# Patient Record
Sex: FEMALE | Race: BLACK OR AFRICAN AMERICAN | Employment: UNEMPLOYED | RURAL
[De-identification: names, ages, dates, MRNs, and addresses within clinical notes are randomized per-mention and may not be internally consistent; named-entity substitution may affect disease eponyms.]

---

## 2017-01-04 DIAGNOSIS — G89.29 CHRONIC MIDLINE LOW BACK PAIN WITHOUT SCIATICA: ICD-10-CM

## 2017-01-04 DIAGNOSIS — M54.50 CHRONIC MIDLINE LOW BACK PAIN WITHOUT SCIATICA: ICD-10-CM

## 2017-01-04 DIAGNOSIS — M05.79 RHEUMATOID ARTHRITIS INVOLVING MULTIPLE SITES WITH POSITIVE RHEUMATOID FACTOR (HCC): ICD-10-CM

## 2017-01-04 RX ORDER — TRAZODONE HYDROCHLORIDE 50 MG/1
50 TABLET ORAL
Qty: 30 TAB | Refills: 2 | Status: SHIPPED | OUTPATIENT
Start: 2017-01-04 | End: 2017-05-30 | Stop reason: ALTCHOICE

## 2017-01-04 RX ORDER — TRAZODONE HYDROCHLORIDE 50 MG/1
50 TABLET ORAL
Qty: 30 TAB | Refills: 2 | Status: CANCELLED | OUTPATIENT
Start: 2017-01-04

## 2017-01-04 RX ORDER — HYDROCODONE BITARTRATE AND ACETAMINOPHEN 7.5; 325 MG/1; MG/1
1 TABLET ORAL
Qty: 90 TAB | Refills: 0 | Status: SHIPPED | OUTPATIENT
Start: 2017-01-04 | End: 2017-02-03 | Stop reason: SDUPTHER

## 2017-01-04 RX ORDER — HYDROCODONE BITARTRATE AND ACETAMINOPHEN 7.5; 325 MG/1; MG/1
1 TABLET ORAL
Qty: 90 TAB | Refills: 0 | Status: CANCELLED | OUTPATIENT
Start: 2017-01-04

## 2017-01-04 NOTE — TELEPHONE ENCOUNTER
Patient is asking for scripts for Hydrocodone and Trazodone. We need to call her when they're ready for .

## 2017-01-06 DIAGNOSIS — M47.817 SPONDYLOSIS OF LUMBOSACRAL REGION, UNSPECIFIED SPINAL OSTEOARTHRITIS COMPLICATION STATUS: ICD-10-CM

## 2017-01-06 DIAGNOSIS — M05.79 RHEUMATOID ARTHRITIS INVOLVING MULTIPLE SITES WITH POSITIVE RHEUMATOID FACTOR (HCC): ICD-10-CM

## 2017-01-06 RX ORDER — TRAMADOL HYDROCHLORIDE 50 MG/1
50 TABLET ORAL
Qty: 90 TAB | Refills: 0 | Status: SHIPPED | OUTPATIENT
Start: 2017-01-06 | End: 2017-02-03 | Stop reason: SDUPTHER

## 2017-01-20 RX ORDER — MOMETASONE FUROATE 1 MG/G
CREAM TOPICAL
Qty: 15 G | Refills: 0 | Status: SHIPPED | OUTPATIENT
Start: 2017-01-20 | End: 2017-04-04 | Stop reason: SDUPTHER

## 2017-01-27 DIAGNOSIS — M79.7 FIBROMYALGIA: ICD-10-CM

## 2017-01-27 RX ORDER — ALPRAZOLAM 0.25 MG/1
0.25 TABLET ORAL
Qty: 45 TAB | Refills: 1 | Status: SHIPPED | OUTPATIENT
Start: 2017-01-27 | End: 2017-03-10 | Stop reason: SDUPTHER

## 2017-02-03 DIAGNOSIS — M54.50 CHRONIC MIDLINE LOW BACK PAIN WITHOUT SCIATICA: ICD-10-CM

## 2017-02-03 DIAGNOSIS — M47.817 SPONDYLOSIS OF LUMBOSACRAL REGION, UNSPECIFIED SPINAL OSTEOARTHRITIS COMPLICATION STATUS: ICD-10-CM

## 2017-02-03 DIAGNOSIS — M05.79 RHEUMATOID ARTHRITIS INVOLVING MULTIPLE SITES WITH POSITIVE RHEUMATOID FACTOR (HCC): ICD-10-CM

## 2017-02-03 DIAGNOSIS — G89.29 CHRONIC MIDLINE LOW BACK PAIN WITHOUT SCIATICA: ICD-10-CM

## 2017-02-03 RX ORDER — HYDROCODONE BITARTRATE AND ACETAMINOPHEN 7.5; 325 MG/1; MG/1
1 TABLET ORAL
Qty: 90 TAB | Refills: 0 | Status: SHIPPED | OUTPATIENT
Start: 2017-02-03 | End: 2017-03-01 | Stop reason: SDUPTHER

## 2017-02-03 RX ORDER — TRAMADOL HYDROCHLORIDE 50 MG/1
50 TABLET ORAL
Qty: 90 TAB | Refills: 0 | Status: SHIPPED | OUTPATIENT
Start: 2017-02-03 | End: 2017-03-31 | Stop reason: SDUPTHER

## 2017-02-10 ENCOUNTER — OFFICE VISIT (OUTPATIENT)
Dept: FAMILY MEDICINE CLINIC | Age: 77
End: 2017-02-10

## 2017-02-10 VITALS
RESPIRATION RATE: 16 BRPM | DIASTOLIC BLOOD PRESSURE: 73 MMHG | BODY MASS INDEX: 31.83 KG/M2 | HEIGHT: 62 IN | OXYGEN SATURATION: 98 % | SYSTOLIC BLOOD PRESSURE: 109 MMHG | WEIGHT: 173 LBS | HEART RATE: 77 BPM | TEMPERATURE: 97.7 F

## 2017-02-10 DIAGNOSIS — F32.A DEPRESSION, UNSPECIFIED DEPRESSION TYPE: Primary | ICD-10-CM

## 2017-02-10 DIAGNOSIS — I48.0 PAROXYSMAL ATRIAL FIBRILLATION (HCC): ICD-10-CM

## 2017-02-10 DIAGNOSIS — Z79.01 ENCOUNTER FOR CURRENT LONG-TERM USE OF ANTICOAGULANTS: ICD-10-CM

## 2017-02-10 DIAGNOSIS — E11.65 TYPE 2 DIABETES MELLITUS WITH HYPERGLYCEMIA, UNSPECIFIED LONG TERM INSULIN USE STATUS: ICD-10-CM

## 2017-02-10 LAB
INR BLD: 5.9
PT POC: 70.9 SEC
VALID INTERNAL CONTROL?: YES

## 2017-02-10 RX ORDER — WARFARIN SODIUM 5 MG/1
TABLET ORAL
Qty: 30 TAB | Refills: 3
Start: 2017-02-10 | End: 2017-05-30 | Stop reason: SDUPTHER

## 2017-02-10 RX ORDER — DULOXETIN HYDROCHLORIDE 20 MG/1
20 CAPSULE, DELAYED RELEASE ORAL DAILY
Qty: 30 CAP | Refills: 3 | Status: SHIPPED | OUTPATIENT
Start: 2017-02-10 | End: 2017-03-10 | Stop reason: SDUPTHER

## 2017-02-10 NOTE — MR AVS SNAPSHOT
Visit Information Date & Time Provider Department Dept. Phone Encounter #  
 2/10/2017  3:30 PM Chely CarrilloDevin 72 [phone] [id number] Follow-up Instructions Return in about 3 months (around 5/10/2017), or if symptoms worsen or fail to improve. Upcoming Health Maintenance Date Due MICROALBUMIN Q1 1/25/1950 DTaP/Tdap/Td series (1 - Tdap) 1/25/1959 ZOSTER VACCINE AGE 60> 1/25/1998 OSTEOPOROSIS SCREENING (DEXA) 1/25/2003 Pneumococcal 65+ High/Highest Risk (1 of 2 - PCV13) 1/25/2003 MEDICARE YEARLY EXAM 1/25/2003 INFLUENZA AGE 9 TO ADULT 8/1/2016 FOOT EXAM Q1 10/19/2016 LIPID PANEL Q1 4/7/2017 EYE EXAM RETINAL OR DILATED Q1 5/19/2017 HEMOGLOBIN A1C Q6M 6/2/2017 GLAUCOMA SCREENING Q2Y 5/19/2018 Allergies as of 2/10/2017  Review Complete On: 2/10/2017 By: NICOLA Oconnell Severity Noted Reaction Type Reactions Statins-hmg-coa Reductase Inhibitors  10/19/2015    Myalgia Current Immunizations  Never Reviewed No immunizations on file. Not reviewed this visit You Were Diagnosed With   
  
 Codes Comments Depression, unspecified depression type    -  Primary ICD-10-CM: F32.9 ICD-9-CM: 434 Type 2 diabetes mellitus with hyperglycemia, unspecified long term insulin use status (HCC)     ICD-10-CM: E11.65 ICD-9-CM: 250.00 Encounter for current long-term use of anticoagulants     ICD-10-CM: Z79.01 
ICD-9-CM: V58.61 Vitals BP Pulse Temp Resp Height(growth percentile) Weight(growth percentile) 109/73 (BP 1 Location: Right arm, BP Patient Position: Sitting) 77 97.7 °F (36.5 °C) (Oral) 16 5' 2\" (1.575 m) 173 lb (78.5 kg) SpO2 BMI OB Status Smoking Status 98% 31.64 kg/m2 Postmenopausal Never Smoker BMI and BSA Data Body Mass Index Body Surface Area  
 31.64 kg/m 2 1.85 m 2 Preferred Pharmacy Pharmacy Name Phone [address] [facility] [address] [place] [phone] Your Updated Medication List  
  
   
This list is accurate as of: 2/10/17  4:32 PM.  Always use your most recent med list.  
  
  
  
  
 ALPRAZolam 0.25 mg tablet Commonly known as:  Jasmina Guillena Take 1 Tab by mouth two (2) times daily as needed for Anxiety. Max Daily Amount: 0.5 mg.  
  
 amLODIPine 10 mg tablet Commonly known as:  Radha Fraction Take 1 Tab by mouth daily. atenolol 50 mg tablet Commonly known as:  TENORMIN  
TAKE 1 TABLET BY MOUTH ONCE DAILY  
  
 BD INSULIN SYRINGE ULTRA-FINE 1/2 mL 30 gauge x 1/2\" Syrg Generic drug:  Insulin Syringe-Needle U-100 INJECT INSULIN DAILY DULoxetine 20 mg capsule Commonly known as:  CYMBALTA Take 1 Cap by mouth daily. Indications: ANXIETY WITH DEPRESSION  
  
 furosemide 40 mg tablet Commonly known as:  LASIX Take 1 Tab by mouth daily. HYDROcodone-acetaminophen 7.5-325 mg per tablet Commonly known as:  Thelbert Orick Take 1 Tab by mouth three (3) times daily as needed for Pain. Max Daily Amount: 3 Tabs. insulin glargine 100 unit/mL injection Commonly known as:  LANTUS INJECT 14 UNITS UNDER THE SKIN DAILY Insulin Syringe-Needle U-100 0.3 mL 30 gauge x 1/2\" Syrg Use as directed  
  
 lansoprazole 15 mg capsule Commonly known as:  PREVACID Take  by mouth Daily (before breakfast). lisinopril 20 mg tablet Commonly known as:  PRINIVIL, ZESTRIL  
TAKE 1 TABLET BY MOUTH DAILY  
  
 methotrexate 2.5 mg tablet Commonly known as:  Melodye Tank Take 6 tablets by mouth on the same day each week. i  
  
 mometasone 0.1 % topical cream  
Commonly known as:  ELOCON  
APPLY TO AFFECTED AREA DAILY predniSONE 5 mg tablet Commonly known as:  Guy Sprawls Take up to 7 tabs daily as directed in written instructions given. STOOL SOFTENER 100 mg capsule Generic drug:  docusate sodium Take 100 mg by mouth two (2) times daily as needed for Constipation. traMADol 50 mg tablet Commonly known as:  ULTRAM  
Take 1 Tab by mouth three (3) times daily as needed for Pain. Max Daily Amount: 150 mg. Indications: Pain  
  
 traZODone 50 mg tablet Commonly known as:  Yell Shanon Take 1 Tab by mouth nightly. warfarin 5 mg tablet Commonly known as:  COUMADIN Take 1 Tab by mouth daily. Indications: PREVENT THROMBOEMBOLISM IN CHRONIC ATRIAL FIBRILLATION Prescriptions Sent to Pharmacy Refills DULoxetine (CYMBALTA) 20 mg capsule 3 Sig: Take 1 Cap by mouth daily. Indications: ANXIETY WITH DEPRESSION Class: Normal  
 Pharmacy: [facility] [place] [address], [address] [place] [address] [address] Ph #: [phone] Route: Oral  
  
We Performed the Following AMB POC PT/INR [94535 CPT(R)] HEMOGLOBIN A1C WITH EAG [09092 CPT(R)] METABOLIC PANEL, BASIC [78551 CPT(R)] Follow-up Instructions Return in about 3 months (around 5/10/2017), or if symptoms worsen or fail to improve. Introducing [facility]! Beka Wong introduces [employer] patient portal. Now you can access parts of your medical record, email your doctor's office, and request medication refills online. 1. In your internet browser, go to [email or web address] 2. Click on the First Time User? Click Here link in the Sign In box. You will see the New Member Sign Up page. 3. Enter your [employer] Access Code exactly as it appears below. You will not need to use this code after youve completed the sign-up process. If you do not sign up before the expiration date, you must request a new code. · [employer] Access Code: [email or web address] Expires: 3/21/2017  4:31 PM 
 
4. Enter the last four digits of your Social Security Number (xxxx) and Date of Birth (mm/dd/yyyy) as indicated and click Submit. You will be taken to the next sign-up page. 5. Create a iyzico ID. This will be your i[employer]ico login ID and cannot be changed, so think of one that is secure and easy to remember. 6. Create a iyzico password. You can change your password at any time. 7. Enter your Password Reset Question and Answer. This can be used at a later time if you forget your password. 8. Enter your e-mail address. You will receive e-mail notification when new information is available in [address] 9. Click Sign Up. You can now view and download portions of your medical record. 10. Click the Download Summary menu link to download a portable copy of your medical information. If you have questions, please visit the Frequently Asked Questions section of the [employer] website. Remember, [employer] is NOT to be used for urgent needs. For medical emergencies, dial 911. Now available from your iPhone and Android! Please provide this summary of care documentation to your next provider. Your primary care clinician is listed as Joseph Zacarias. If you have any questions after today's visit, please call [phone].

## 2017-02-10 NOTE — PROGRESS NOTES
[address] [address] [facility] [place]   [phone]     2/10/2017  Chief Complaint   Patient presents with    Follow-up     routine    Other     learn how to operate new PT machine    Medication Refill       HPI  Ms. Momo Glass is a 76 y.o. female presents today in follow up. Daughter/ caregiver reports patient is well. However she is staying in her room a lot, not coming out to join the family. Lost her sister and her son is now in a nursing home. States she is feeling down and does not enjoy           Patient Active Problem List   Diagnosis Code    Rheumatoid arthritis ([facility] [address] M06.9    Lumbosacral spondylosis M47.817    Sacroiliac joint disease M53.3    Back pain M54.9    Diabetes mellitus ([facility] [address] E11.9    Atrial fibrillation (HCC) I48.91    Hypovitaminosis D E55.9    Gastroesophageal reflux disease without esophagitis K21.9    Encounter for current long-term use of anticoagulants Z79.01    Fibromyalgia M79.7    Essential hypertension I10    Weakness of trunk musculature M62.81    Lethargy R53.83    Right buttock pain M79.1    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 Max Daily Amount: 150 mg. Indications: Pain 90 Tab 0    HYDROcodone-acetaminophen (NORCO) 7.5-325 mg per tablet Take 1 Tab by mouth three (3) times daily as needed for Pain. Max Daily Amount: 3 Tabs. 90 Tab 0    ALPRAZolam (XANAX) 0.25 mg tablet Take 1 Tab by mouth two (2) times daily as needed for Anxiety.  Max Daily Amount: 0.5 mg. 45 Tab 1    mometasone (ELOCON) 0.1 % topical cream APPLY TO AFFECTED AREA DAILY 15 g 0    traZODone (DESYREL) 50 mg tablet Take 1 Tab by mouth nightly. 30 Tab 2    amLODIPine (NORVASC) 10 mg tablet Take 1 Tab by mouth daily. 30 Tab 5    insulin glargine (LANTUS) 100 unit/mL injection INJECT 14 UNITS UNDER THE SKIN DAILY 10 mL 5    lisinopril (PRINIVIL, ZESTRIL) 20 mg tablet TAKE 1 TABLET BY MOUTH DAILY 30 Tab 12    predniSONE (DELTASONE) 5 mg tablet Take up to 7 tabs daily as directed in written instructions given. 28 Tab 1    furosemide (LASIX) 40 mg tablet Take 1 Tab by mouth daily. 90 Tab 1    Insulin Syringe-Needle U-100 0.3 mL 30 x 1/2\" syrg Use as directed 100 Syringe 3    lansoprazole (PREVACID) 15 mg capsule Take  by mouth Daily (before breakfast).  methotrexate (RHEUMATREX) 2.5 mg tablet Take 6 tablets by mouth on the same day each week. i 24 Tab 11    BD INSULIN SYRINGE ULTRA-FINE 1/2 mL 30 x 1/2\" syrg INJECT INSULIN DAILY 100 Syringe 11    docusate sodium (STOOL SOFTENER) 100 mg capsule Take 100 mg by mouth two (2) times daily as needed for Constipation. No current facility-administered medications on file prior to visit. Lab Results   Component Value Date/Time    Hemoglobin A1c 8.0 12/02/2016 12:06 PM    Hemoglobin A1c 7.5 09/06/2016 02:28 PM    Hemoglobin A1c 7.8 06/22/2016 04:03 PM    Glucose 119 12/02/2016 12:06 PM    Glucose  07/28/2014 03:30 PM    LDL, calculated 168 04/07/2016 11:22 AM    Creatinine 1.22 12/02/2016 12:06 PM     Results for orders placed or performed in visit on 02/10/17   AMB POC PT/INR   Result Value Ref Range    VALID INTERNAL CONTROL POC Yes     Prothrombin time (POC) 70.9 sec    INR POC 5.9              Physical Exam   Cardiovascular: Normal heart sounds. Pulmonary/Chest: Effort normal and breath sounds normal.   Vitals reviewed.     Visit Vitals    /73 (BP 1 Location: Right arm, BP Patient Position: Sitting)    Pulse 77    Temp 97.7 °F (36.5 °C) (Oral)    Resp 16    Ht 5' 2\" (1.575 m)    Wt 173 lb (78.5 kg)    SpO2 98%    BMI 31.64 kg/m2     Wt Readings from Last 3 Encounters:   02/10/17 173 lb (78.5 kg)   12/02/16 184 lb 11.2 oz (83.8 kg)   09/06/16 173 lb 12.8 oz (78.8 kg)     Temp Readings from Last 3 Encounters:   02/10/17 97.7 °F (36.5 °C) (Oral)   12/02/16 98.2 °F (36.8 °C) (Temporal)   09/06/16 98.8 °F (37.1 °C) (Oral)     BP Readings from Last 3 Encounters:   02/10/17 109/73   12/02/16 125/62   09/06/16 142/60     Pulse Readings from Last 3 Encounters:   02/10/17 77   12/02/16 62   09/06/16 64         Lolitaturner Akins was seen today for follow-up, other and medication refill. Diagnoses and all orders for this visit:    Depression, unspecified depression type  -     DULoxetine (CYMBALTA) 20 mg capsule; Take 1 Cap by mouth daily. Indications: ANXIETY WITH DEPRESSION    Type 2 diabetes mellitus with hyperglycemia, unspecified long term insulin use status (HCC)  -     HEMOGLOBIN A1C WITH EAG  -     METABOLIC PANEL, BASIC    Encounter for current long-term use of anticoagulants  -     AMB POC PT/INR  -     warfarin (COUMADIN) 5 mg tablet; 1/2 tablet on Tues and Thurs and 1 tablet all other days  Indications: PREVENT THROMBOEMBOLISM IN CHRONIC ATRIAL FIBRILLATION    Paroxysmal atrial fibrillation (HCC)  -     warfarin (COUMADIN) 5 mg tablet; 1/2 tablet on Tues and Thurs and 1 tablet all other days  Indications: PREVENT THROMBOEMBOLISM IN CHRONIC ATRIAL FIBRILLATION      Plan   Refer to anticoagulation chart for follow up plan       Follow-up Disposition:  Return in about 3 months (around 5/10/2017), or if symptoms worsen or fail to improve.       [employer] JOSE, JACKSONP

## 2017-02-11 LAB
BUN SERPL-MCNC: 19 MG/DL (ref 8–27)
BUN/CREAT SERPL: 14 (ref 11–26)
CALCIUM SERPL-MCNC: 9.4 MG/DL (ref 8.7–10.3)
CHLORIDE SERPL-SCNC: 96 MMOL/L (ref 96–106)
CO2 SERPL-SCNC: 21 MMOL/L (ref 18–29)
CREAT SERPL-MCNC: 1.35 MG/DL (ref 0.57–1)
EST. AVERAGE GLUCOSE BLD GHB EST-MCNC: 186 MG/DL
GLUCOSE SERPL-MCNC: 222 MG/DL (ref 65–99)
HBA1C MFR BLD: 8.1 % (ref 4.8–5.6)
INTERPRETATION: NORMAL
POTASSIUM SERPL-SCNC: 4.7 MMOL/L (ref 3.5–5.2)
SODIUM SERPL-SCNC: 139 MMOL/L (ref 134–144)

## 2017-02-15 ENCOUNTER — TELEPHONE (OUTPATIENT)
Dept: FAMILY MEDICINE CLINIC | Age: 77
End: 2017-02-15

## 2017-02-17 NOTE — PROGRESS NOTES
Blood sugar level is elevated   Kidney function is diminished and slightly worse than 2 months ago      Increase the Lantus to 20 units daily. Adhere to a diabetic diet.      F/u in 3 months to repeat blood work

## 2017-03-01 DIAGNOSIS — M05.79 RHEUMATOID ARTHRITIS INVOLVING MULTIPLE SITES WITH POSITIVE RHEUMATOID FACTOR (HCC): ICD-10-CM

## 2017-03-01 DIAGNOSIS — G89.29 CHRONIC MIDLINE LOW BACK PAIN WITHOUT SCIATICA: ICD-10-CM

## 2017-03-01 DIAGNOSIS — M54.50 CHRONIC MIDLINE LOW BACK PAIN WITHOUT SCIATICA: ICD-10-CM

## 2017-03-03 RX ORDER — HYDROCODONE BITARTRATE AND ACETAMINOPHEN 7.5; 325 MG/1; MG/1
1 TABLET ORAL
Qty: 90 TAB | Refills: 0 | Status: SHIPPED | OUTPATIENT
Start: 2017-03-03 | End: 2017-03-31 | Stop reason: SDUPTHER

## 2017-03-06 RX ORDER — FUROSEMIDE 40 MG/1
40 TABLET ORAL DAILY
Qty: 90 TAB | Refills: 1 | Status: SHIPPED | OUTPATIENT
Start: 2017-03-06 | End: 2017-05-30 | Stop reason: SDUPTHER

## 2017-03-07 ENCOUNTER — OFFICE VISIT (OUTPATIENT)
Dept: FAMILY MEDICINE CLINIC | Age: 77
End: 2017-03-07

## 2017-03-07 VITALS
OXYGEN SATURATION: 96 % | HEART RATE: 68 BPM | RESPIRATION RATE: 18 BRPM | TEMPERATURE: 97 F | HEIGHT: 62 IN | DIASTOLIC BLOOD PRESSURE: 60 MMHG | SYSTOLIC BLOOD PRESSURE: 100 MMHG

## 2017-03-07 DIAGNOSIS — M79.7 FIBROMYALGIA: ICD-10-CM

## 2017-03-07 DIAGNOSIS — R53.83 FATIGUE, UNSPECIFIED TYPE: ICD-10-CM

## 2017-03-07 DIAGNOSIS — I48.91 ATRIAL FIBRILLATION, UNSPECIFIED TYPE (HCC): ICD-10-CM

## 2017-03-07 DIAGNOSIS — T45.514A: ICD-10-CM

## 2017-03-07 DIAGNOSIS — Z79.631 LONG TERM METHOTREXATE USER: Primary | ICD-10-CM

## 2017-03-07 DIAGNOSIS — F32.A DEPRESSION, UNSPECIFIED DEPRESSION TYPE: ICD-10-CM

## 2017-03-07 LAB
INR BLD: 96
PT POC: 8 SEC
VALID INTERNAL CONTROL?: YES

## 2017-03-07 RX ORDER — PREDNISONE 5 MG/1
TABLET ORAL
Qty: 28 TAB | Refills: 1 | Status: CANCELLED | OUTPATIENT
Start: 2017-03-07

## 2017-03-07 RX ORDER — ALPRAZOLAM 0.25 MG/1
0.25 TABLET ORAL
Qty: 45 TAB | Refills: 1 | Status: CANCELLED | OUTPATIENT
Start: 2017-03-07

## 2017-03-07 RX ORDER — FUROSEMIDE 40 MG/1
40 TABLET ORAL DAILY
Qty: 90 TAB | Refills: 1 | Status: CANCELLED | OUTPATIENT
Start: 2017-03-07

## 2017-03-07 RX ORDER — ATENOLOL 50 MG/1
TABLET ORAL
Qty: 30 TAB | Refills: 3 | Status: CANCELLED | OUTPATIENT
Start: 2017-03-07

## 2017-03-07 NOTE — PROGRESS NOTES
Subjective:     Vito Mulligan is a 76 y.o. female who presents today with her daughter  the following:  Chief Complaint   Patient presents with    Anticoagulation    Fatigue   Here for routine PT and INR . Daughter has several areas of concerned regarding her mother    1. Possible Confusion : taking several doses of her medicine at one time    2. Depression: not eating or drinking. Does not want to get OOB , get dressed or perform ADL. Mar Lema She stays in her room a lot not coming out to join family. Lost her sister and her son is now in a nursing home. 3. Weakness: extreme fatigue     4. Anxiety: mother is anxious and nervous frequently    ROS:  Gen: denies fever, chills, fatigue,positive for  weight loss, 10lbs in 1 month  HEENT:denies blurry vision, nasal congestion, sore throat  Resp: denies dypsnea, cough, wheezing  CV: denies chest pain radiating to the jaws or arms, palpitations, lower extremity edema  Abd: denies nausea, vomiting, diarrhea, constipation  Neuro: denies numbness/tingling  Endo: denies polyuria, polydipsia, heat/cold intolerance  Heme: no lymphadenopathy    Allergies   Allergen Reactions    Statins-Hmg-Coa Reductase Inhibitors Myalgia         Current Outpatient Prescriptions:     furosemide (LASIX) 40 mg tablet, Take 1 Tab by mouth daily. , Disp: 90 Tab, Rfl: 1    HYDROcodone-acetaminophen (NORCO) 7.5-325 mg per tablet, Take 1 Tab by mouth three (3) times daily as needed for Pain. Max Daily Amount: 3 Tabs., Disp: 90 Tab, Rfl: 0    DULoxetine (CYMBALTA) 20 mg capsule, Take 1 Cap by mouth daily.  Indications: ANXIETY WITH DEPRESSION, Disp: 30 Cap, Rfl: 3    warfarin (COUMADIN) 5 mg tablet, 1/2 tablet on Tues and Thurs and 1 tablet all other days  Indications: PREVENT THROMBOEMBOLISM IN CHRONIC ATRIAL FIBRILLATION, Disp: 30 Tab, Rfl: 3    atenolol (TENORMIN) 50 mg tablet, TAKE 1 TABLET BY MOUTH ONCE DAILY, Disp: 30 Tab, Rfl: 3    traMADol (ULTRAM) 50 mg tablet, Take 1 Tab by mouth three (3) times daily as needed for Pain. Max Daily Amount: 150 mg. Indications: Pain, Disp: 90 Tab, Rfl: 0    ALPRAZolam (XANAX) 0.25 mg tablet, Take 1 Tab by mouth two (2) times daily as needed for Anxiety. Max Daily Amount: 0.5 mg., Disp: 45 Tab, Rfl: 1    mometasone (ELOCON) 0.1 % topical cream, APPLY TO AFFECTED AREA DAILY, Disp: 15 g, Rfl: 0    traZODone (DESYREL) 50 mg tablet, Take 1 Tab by mouth nightly., Disp: 30 Tab, Rfl: 2    amLODIPine (NORVASC) 10 mg tablet, Take 1 Tab by mouth daily. , Disp: 30 Tab, Rfl: 5    insulin glargine (LANTUS) 100 unit/mL injection, INJECT 14 UNITS UNDER THE SKIN DAILY, Disp: 10 mL, Rfl: 5    lisinopril (PRINIVIL, ZESTRIL) 20 mg tablet, TAKE 1 TABLET BY MOUTH DAILY, Disp: 30 Tab, Rfl: 12    predniSONE (DELTASONE) 5 mg tablet, Take up to 7 tabs daily as directed in written instructions given., Disp: 28 Tab, Rfl: 1    Insulin Syringe-Needle U-100 0.3 mL 30 x 1/2\" syrg, Use as directed, Disp: 100 Syringe, Rfl: 3    lansoprazole (PREVACID) 15 mg capsule, Take  by mouth Daily (before breakfast). , Disp: , Rfl:     BD INSULIN SYRINGE ULTRA-FINE 1/2 mL 30 x 1/2\" syrg, INJECT INSULIN DAILY, Disp: 100 Syringe, Rfl: 11    docusate sodium (STOOL SOFTENER) 100 mg capsule, Take 100 mg by mouth two (2) times daily as needed for Constipation. , Disp: , Rfl:     Past Medical History:   Diagnosis Date    Atrial fibrillation ([address]     Atrial fibrillation ([address] 12/2/2013    Cardiomegaly     CKD (chronic kidney disease)     COPD     Degenerative spondylolisthesis     Diabetes (HCC)     IDDM    GERD (gastroesophageal reflux disease)     Hyperlipidemia     Hypertension     Hypokalemia     Myalgia     Osteoarthritis of knee     Rheumatoid arthritis(714.0) 12/2/2013    Tachycardia     Venous insufficiency     Vitamin D deficiency        Past Surgical History:   Procedure Laterality Date    HX HYSTERECTOMY      PARTIAL HYSTERECTOMY       History   Smoking Status    Never Smoker Smokeless Tobacco    Never Used       Social History     Social History    Marital status:      Spouse name: N/A    Number of children: N/A    Years of education: N/A     Social History Main Topics    Smoking status: Never Smoker    Smokeless tobacco: Never Used    Alcohol use No    Drug use: No    Sexual activity: Not Currently     Partners: Male     Birth control/ protection: None     Other Topics Concern     Service No    Blood Transfusions No    Caffeine Concern Yes    Occupational Exposure No    Hobby Hazards No    Sleep Concern No    Stress Concern No    Weight Concern Yes     losing weight    Special Diet No    Back Care Yes     pain    Exercise No    Bike Helmet No     n/a    Seat Belt No    Self-Exams No     Social History Narrative       Family History   Problem Relation Age of Onset    Diabetes Father     Diabetes Sister     Heart Disease Brother      ENLARGED HEART    Elevated Lipids Brother     Diabetes Sister          Objective:     Visit Vitals    /60 (BP 1 Location: Right arm, BP Patient Position: Sitting)    Pulse 68    Temp 97 °F (36.1 °C) (Temporal)    Resp 18    Ht 5' 2\" (1.575 m)    SpO2 96%     There is no height or weight on file to calculate BMI. General: Quiet , withdrawn, pale frail appearing. Ten pound weight loss from previous month  HEENT :  Eyes: pupils equal, round, react to light and accommodation. Nose: patent. Mouth and throat is clear. Neck:supple full range of motion no thyromegaly. Trachea midline, No carotid bruits. No significant lymphadenopathy  Lungs[de-identified] clear to auscultation without wheezes, rales, or rhonchi. Heart :RRR, S1 & S2 are normal intensity. No murmur; no gallop  Abdomen: bowel sounds active. No tenderness, guarding, rebound, masses, hepatic or spleen enlargement  Back: no CVA tenderness. Extremities: without clubbing, cyanosis, or edema  Pulses: radial and femoral pulses are normal  Neuro: HMF intact. Cranial nerves II through XII grossly normal.  Motor: unable to tolerate exam  Deep tendon reflexes: +2 equal    Results for orders placed or performed in visit on 03/07/17   AMB POC PT/INR   Result Value Ref Range    VALID INTERNAL CONTROL POC Yes     Prothrombin time (POC) 8.0 sec    INR POC 96.0        Results for orders placed or performed in visit on 03/07/17   AMB POC PT/INR   Result Value Ref Range    VALID INTERNAL CONTROL POC Yes     Prothrombin time (POC) 8.0 sec    INR POC 96.0        Assessment/ Plan:   Overdose of coumadin suspected  Fatigue   Depression       1. Long term methotrexate user    2. Atrial fibrillation, unspecified type ([facility] [address]    3. Fibromyalgia        Orders Placed This Encounter    COLLECTION CAPILLARY BLOOD SPECIMEN    AMB POC PT/INR     Report called to Dr. Kevin Beasley at [facility]. Verbal and written instructions (see AVS) provided.  Patient expresses understanding of diagnosis and treatment plan.     Evan Baird, FNP-C

## 2017-03-07 NOTE — MR AVS SNAPSHOT
Visit Information Date & Time Provider Department Dept. Phone Encounter #  
 3/7/2017  4:00 PM Harvey Lou NP [address] [phone] [id number] Upcoming Health Maintenance Date Due MICROALBUMIN Q1 1/25/1950 OSTEOPOROSIS SCREENING (DEXA) 1/25/2003 FOOT EXAM Q1 10/19/2016 LIPID PANEL Q1 4/7/2017 Pneumococcal 65+ High/Highest Risk (2 of 2 - PPSV23) 5/2/2017 EYE EXAM RETINAL OR DILATED Q1 5/19/2017 HEMOGLOBIN A1C Q6M 8/10/2017 MEDICARE YEARLY EXAM 3/8/2018 GLAUCOMA SCREENING Q2Y 5/19/2018 DTaP/Tdap/Td series (2 - Td) 3/7/2027 Allergies as of 3/7/2017  Review Complete On: 3/7/2017 By: Sophia Singh RN Severity Noted Reaction Type Reactions Statins-hmg-coa Reductase Inhibitors  10/19/2015    Myalgia Current Immunizations  Never Reviewed No immunizations on file. Not reviewed this visit You Were Diagnosed With   
  
 Codes Comments Long term methotrexate user    -  Primary ICD-10-CM: G21.398 ICD-9-CM: V58.69 Atrial fibrillation, unspecified type ([facility]     ICD-10-CM: I48.91 
ICD-9-CM: 427.31 Fibromyalgia     ICD-10-CM: M79.7 ICD-9-CM: 729.1 Vitals BP Pulse Temp Resp Height(growth percentile) SpO2  
 100/60 (BP 1 Location: Right arm, BP Patient Position: Sitting) 68 97 °F (36.1 °C) (Temporal) 18 5' 2\" (1.575 m) 96% OB Status Smoking Status Postmenopausal Never Smoker Vitals History Preferred Pharmacy Pharmacy Name Phone [address], [address] AT [facility] [address] [place] [phone] Your Updated Medication List  
  
   
This list is accurate as of: 3/7/17  4:58 PM.  Always use your most recent med list.  
  
  
  
  
 ALPRAZolam 0.25 mg tablet Commonly known as:  Pixie Oz Take 1 Tab by mouth two (2) times daily as needed for Anxiety.  Max Daily Amount: 0.5 mg.  
  
 amLODIPine 10 mg tablet Commonly known as:  Yancy Blade Take 1 Tab by mouth daily. atenolol 50 mg tablet Commonly known as:  TENORMIN  
TAKE 1 TABLET BY MOUTH ONCE DAILY  
  
 BD INSULIN SYRINGE ULTRA-FINE 1/2 mL 30 gauge x 1/2\" Syrg Generic drug:  Insulin Syringe-Needle U-100 INJECT INSULIN DAILY DULoxetine 20 mg capsule Commonly known as:  CYMBALTA Take 1 Cap by mouth daily. Indications: ANXIETY WITH DEPRESSION  
  
 furosemide 40 mg tablet Commonly known as:  LASIX Take 1 Tab by mouth daily. HYDROcodone-acetaminophen 7.5-325 mg per tablet Commonly known as:  Chrissy Oyster Take 1 Tab by mouth three (3) times daily as needed for Pain. Max Daily Amount: 3 Tabs. insulin glargine 100 unit/mL injection Commonly known as:  LANTUS INJECT 14 UNITS UNDER THE SKIN DAILY Insulin Syringe-Needle U-100 0.3 mL 30 gauge x 1/2\" Syrg Use as directed  
  
 lansoprazole 15 mg capsule Commonly known as:  PREVACID Take  by mouth Daily (before breakfast). lisinopril 20 mg tablet Commonly known as:  PRINIVIL ZESTRIL  
TAKE 1 TABLET BY MOUTH DAILY  
  
 mometasone 0.1 % topical cream  
Commonly known as:  ELOCON  
APPLY TO AFFECTED AREA DAILY predniSONE 5 mg tablet Commonly known as:  Sharonda College Take up to 7 tabs daily as directed in written instructions given. STOOL SOFTENER 100 mg capsule Generic drug:  docusate sodium Take 100 mg by mouth two (2) times daily as needed for Constipation. traMADol 50 mg tablet Commonly known as:  ULTRAM  
Take 1 Tab by mouth three (3) times daily as needed for Pain. Max Daily Amount: 150 mg. Indications: Pain  
  
 traZODone 50 mg tablet Commonly known as:  Eriberto Garcia Take 1 Tab by mouth nightly. warfarin 5 mg tablet Commonly known as:  COUMADIN  
1/2 tablet on Tues and Thurs and 1 tablet all other days  Indications: PREVENT THROMBOEMBOLISM IN CHRONIC ATRIAL FIBRILLATION  
  
  
  
  
 We Performed the Following AMB POC PT/INR [94185 CPT(R)] COLLECTION CAPILLARY BLOOD SPECIMEN [35439 CPT(R)] Introducing [facility]! [facility] introduces [employer] patient portal. Now you can access parts of your medical record, email your doctor's office, and request medication refills online. 1. In your internet browser, go to [email or web address] 2. Click on the First Time User? Click Here link in the Sign In box. You will see the New Member Sign Up page. 3. Enter your [employer] Access Code exactly as it appears below. You will not need to use this code after youve completed the sign-up process. If you do not sign up before the expiration date, you must request a new code. · [employer] Access Code: [email or web address] Expires: 3/21/2017  4:31 PM 
 
4. Enter the last four digits of your Social Security Number (xxxx) and Date of Birth (mm/dd/yyyy) as indicated and click Submit. You will be taken to the next sign-up page. 5. Create a [employer] ID. This will be your [employer] login ID and cannot be changed, so think of one that is secure and easy to remember. 6. Create a [employer] password. You can change your password at any time. 7. Enter your Password Reset Question and Answer. This can be used at a later time if you forget your password. 8. Enter your e-mail address. You will receive e-mail notification when new information is available in [address] 9. Click Sign Up. You can now view and download portions of your medical record. 10. Click the Download Summary menu link to download a portable copy of your medical information. If you have questions, please visit the Frequently Asked Questions section of the [employer] website. Remember, [employer] is NOT to be used for urgent needs. For medical emergencies, dial 911. Now available from your iPhone and Android! Please provide this summary of care documentation to your next provider. Your primary care clinician is listed as Joel Roberts. If you have any questions after today's visit, please call [phone].

## 2017-03-10 ENCOUNTER — OFFICE VISIT (OUTPATIENT)
Dept: FAMILY MEDICINE CLINIC | Age: 77
End: 2017-03-10

## 2017-03-10 VITALS
HEIGHT: 62 IN | BODY MASS INDEX: 30.95 KG/M2 | OXYGEN SATURATION: 97 % | WEIGHT: 168.2 LBS | HEART RATE: 79 BPM | SYSTOLIC BLOOD PRESSURE: 126 MMHG | RESPIRATION RATE: 16 BRPM | TEMPERATURE: 97.8 F | DIASTOLIC BLOOD PRESSURE: 79 MMHG

## 2017-03-10 DIAGNOSIS — M79.7 FIBROMYALGIA: ICD-10-CM

## 2017-03-10 DIAGNOSIS — M79.605 PAIN OF LEFT LOWER EXTREMITY: ICD-10-CM

## 2017-03-10 DIAGNOSIS — F32.A DEPRESSION, UNSPECIFIED DEPRESSION TYPE: ICD-10-CM

## 2017-03-10 DIAGNOSIS — I48.91 ATRIAL FIBRILLATION, UNSPECIFIED TYPE (HCC): Primary | ICD-10-CM

## 2017-03-10 LAB
INR BLD: 4.1
PT POC: 49 SEC
VALID INTERNAL CONTROL?: YES

## 2017-03-10 RX ORDER — ATENOLOL 50 MG/1
50 TABLET ORAL DAILY
Qty: 30 TAB | Refills: 3 | Status: SHIPPED | OUTPATIENT
Start: 2017-03-10 | End: 2017-05-30 | Stop reason: SDUPTHER

## 2017-03-10 RX ORDER — PREDNISONE 5 MG/1
TABLET ORAL
Qty: 28 TAB | Refills: 1 | Status: CANCELLED | OUTPATIENT
Start: 2017-03-10

## 2017-03-10 RX ORDER — ALPRAZOLAM 0.25 MG/1
0.25 TABLET ORAL
Qty: 45 TAB | Refills: 1 | Status: CANCELLED | OUTPATIENT
Start: 2017-03-10

## 2017-03-10 RX ORDER — ATENOLOL 50 MG/1
50 TABLET ORAL DAILY
Qty: 30 TAB | Refills: 3 | Status: CANCELLED | OUTPATIENT
Start: 2017-03-10

## 2017-03-10 RX ORDER — PREDNISONE 5 MG/1
TABLET ORAL
Qty: 28 TAB | Refills: 1 | Status: SHIPPED | OUTPATIENT
Start: 2017-03-10 | End: 2017-06-15 | Stop reason: SDUPTHER

## 2017-03-10 RX ORDER — ALPRAZOLAM 0.25 MG/1
0.25 TABLET ORAL
Qty: 45 TAB | Refills: 1 | Status: SHIPPED | OUTPATIENT
Start: 2017-03-10 | End: 2017-07-11 | Stop reason: SDUPTHER

## 2017-03-10 RX ORDER — DULOXETIN HYDROCHLORIDE 20 MG/1
20 CAPSULE, DELAYED RELEASE ORAL DAILY
Qty: 30 CAP | Refills: 3 | Status: SHIPPED | OUTPATIENT
Start: 2017-03-10 | End: 2017-05-30

## 2017-03-10 NOTE — MR AVS SNAPSHOT
Visit Information Date & Time Provider Department Dept. Phone Encounter #  
 3/10/2017 11:30 AM Luis Miguel Boothe NP [address] [phone] [id number] Upcoming Health Maintenance Date Due MICROALBUMIN Q1 1/25/1950 OSTEOPOROSIS SCREENING (DEXA) 1/25/2003 FOOT EXAM Q1 10/19/2016 LIPID PANEL Q1 4/7/2017 Pneumococcal 65+ High/Highest Risk (2 of 2 - PPSV23) 5/2/2017 EYE EXAM RETINAL OR DILATED Q1 5/19/2017 HEMOGLOBIN A1C Q6M 8/10/2017 MEDICARE YEARLY EXAM 3/8/2018 GLAUCOMA SCREENING Q2Y 5/19/2018 DTaP/Tdap/Td series (2 - Td) 3/7/2027 Allergies as of 3/10/2017  Review Complete On: 3/10/2017 By: Luis Miguel Boothe NP Severity Noted Reaction Type Reactions Statins-hmg-coa Reductase Inhibitors  10/19/2015    Myalgia Current Immunizations  Never Reviewed No immunizations on file. Not reviewed this visit You Were Diagnosed With   
  
 Codes Comments Atrial fibrillation, unspecified type ([facility]    -  Primary ICD-10-CM: I48.91 
ICD-9-CM: 427.31 Fibromyalgia     ICD-10-CM: M79.7 ICD-9-CM: 729.1 Depression, unspecified depression type     ICD-10-CM: F32.9 ICD-9-CM: 811 Pain of left lower extremity     ICD-10-CM: M79.605 ICD-9-CM: 729.5 Vitals BP Pulse Temp Resp Height(growth percentile) 126/79 (BP 1 Location: Left arm, BP Patient Position: Sitting) 79 97.8 °F (36.6 °C) (Temporal) 16 5' 2\" (1.575 m) Weight(growth percentile) SpO2 BMI OB Status Smoking Status 168 lb 3.2 oz (76.3 kg) 97% 30.76 kg/m2 Postmenopausal Never Smoker BMI and BSA Data Body Mass Index Body Surface Area 30.76 kg/m 2 1.83 m 2 Preferred Pharmacy Pharmacy Name Phone [address], [address] [facility] AT [facility] OF  [address] [place] [phone] Your Updated Medication List  
  
   
This list is accurate as of: 3/10/17 11:38 AM.  Always use your most recent med list.  
  
  
  
  
 ALPRAZolam 0.25 mg tablet Commonly known as:  Oxnard Curio Take 1 Tab by mouth two (2) times daily as needed for Anxiety. Max Daily Amount: 0.5 mg. Indications: ANXIETY WITH DEPRESSION  
  
 amLODIPine 10 mg tablet Commonly known as:  Shaunna Darting Take 1 Tab by mouth daily. atenolol 50 mg tablet Commonly known as:  TENORMIN Take 1 Tab by mouth daily. BD INSULIN SYRINGE ULTRA-FINE 1/2 mL 30 gauge x 1/2\" Syrg Generic drug:  Insulin Syringe-Needle U-100 INJECT INSULIN DAILY DULoxetine 20 mg capsule Commonly known as:  CYMBALTA Take 1 Cap by mouth daily. Indications: ANXIETY WITH DEPRESSION  
  
 furosemide 40 mg tablet Commonly known as:  LASIX Take 1 Tab by mouth daily. HYDROcodone-acetaminophen 7.5-325 mg per tablet Commonly known as:  [facility] Take 1 Tab by mouth three (3) times daily as needed for Pain. Max Daily Amount: 3 Tabs. insulin glargine 100 unit/mL injection Commonly known as:  LANTUS INJECT 14 UNITS UNDER THE SKIN DAILY Insulin Syringe-Needle U-100 0.3 mL 30 gauge x 1/2\" Syrg Use as directed  
  
 lansoprazole 15 mg capsule Commonly known as:  PREVACID Take  by mouth Daily (before breakfast). lisinopril 20 mg tablet Commonly known as:  PRINIVIL, ZESTRIL  
TAKE 1 TABLET BY MOUTH DAILY  
  
 mometasone 0.1 % topical cream  
Commonly known as:  ELOCON  
APPLY TO AFFECTED AREA DAILY predniSONE 5 mg tablet Commonly known as:  Hollace Davie Take up to 7 tabs daily as directed in written instructions given. STOOL SOFTENER 100 mg capsule Generic drug:  docusate sodium Take 100 mg by mouth two (2) times daily as needed for Constipation. traMADol 50 mg tablet Commonly known as:  ULTRAM  
Take 1 Tab by mouth three (3) times daily as needed for Pain. Max Daily Amount: 150 mg. Indications: Pain  
  
 traZODone 50 mg tablet Commonly known as:  Oletta Alessandra Take 1 Tab by mouth nightly. warfarin 5 mg tablet Commonly known as:  COUMADIN  
1/2 tablet on Tues and Thurs and 1 tablet all other days  Indications: PREVENT THROMBOEMBOLISM IN CHRONIC ATRIAL FIBRILLATION Prescriptions Printed Refills ALPRAZolam (XANAX) 0.25 mg tablet 1 Sig: Take 1 Tab by mouth two (2) times daily as needed for Anxiety. Max Daily Amount: 0.5 mg. Indications: ANXIETY WITH DEPRESSION Class: Print Route: Oral  
  
Prescriptions Sent to Pharmacy Refills  
 atenolol (TENORMIN) 50 mg tablet 3 Sig: Take 1 Tab by mouth daily. Class: Normal  
 Pharmacy: [facility] [address]  Ph #: [phone] Route: Oral  
 predniSONE (DELTASONE) 5 mg tablet 1 Sig: Take up to 7 tabs daily as directed in written instructions given. Class: Normal  
 Pharmacy: [facility] [address]  Ph #: [phone] DULoxetine (CYMBALTA) 20 mg capsule 3 Sig: Take 1 Cap by mouth daily. Indications: ANXIETY WITH DEPRESSION Class: Normal  
 Pharmacy: [facility] [address]  Ph #: [phone] Route: Oral  
  
We Performed the Following AMB POC PT/INR [65690 CPT(R)] COLLECTION CAPILLARY BLOOD SPECIMEN [18605 CPT(R)] To-Do List   
 03/10/2017 Imaging:  XR KNEE LT MAX 2 VWS Introducing [facility]! [employer] introduces [employer] patient portal. Now you can access parts of your medical record, email your doctor's office, and request medication refills online. 1. In your internet browser, go to [email or web address] 2. Click on the First Time User? Click Here link in the Sign In box. You will see the New Member Sign Up page. 3. Enter your [employer] Access Code exactly as it appears below.  You will not need to use this code after youve completed the sign-up process. If you do not sign up before the expiration date, you must request a new code. · [employer] Access Code: [email or web address] Expires: 3/21/2017  4:31 PM 
 
4. Enter the last four digits of your Social Security Number (xxxx) and Date of Birth (mm/dd/yyyy) as indicated and click Submit. You will be taken to the next sign-up page. 5. Create a [employer] ID. This will be your [employer] login ID and cannot be changed, so think of one that is secure and easy to remember. 6. Create a [employer] password. You can change your password at any time. 7. Enter your Password Reset Question and Answer. This can be used at a later time if you forget your password. 8. Enter your e-mail address. You will receive e-mail notification when new information is available in [address] 9. Click Sign Up. You can now view and download portions of your medical record. 10. Click the Download Summary menu link to download a portable copy of your medical information. If you have questions, please visit the Frequently Asked Questions section of the [employer] website. Remember, [employer] is NOT to be used for urgent needs. For medical emergencies, dial 911. Now available from your iPhone and Android! Please provide this summary of care documentation to your next provider. Your primary care clinician is listed as Uche Hernandez. If you have any questions after today's visit, please call [phone].

## 2017-03-10 NOTE — PROGRESS NOTES
Subjective:     Genevieve Duran is a 76 y.o. female who presents today with her daughter with  the following:  Chief Complaint   Patient presents with    Anticoagulation   Feeling better today. Was at [facility] last week for elevated PT and INR  Over 96 and over 8 respectively. Treated and released. Daughter is now managing her medication. Jo Ann Burns continues to complain of left  knee pain. She puts most of her weight on the left leg when standing and transferring. Leg does not bother her today. Allergies   Allergen Reactions    Statins-Hmg-Coa Reductase Inhibitors Myalgia         Current Outpatient Prescriptions:     ALPRAZolam (XANAX) 0.25 mg tablet, Take 1 Tab by mouth two (2) times daily as needed for Anxiety. Max Daily Amount: 0.5 mg. Indications: ANXIETY WITH DEPRESSION, Disp: 45 Tab, Rfl: 1    atenolol (TENORMIN) 50 mg tablet, Take 1 Tab by mouth daily. , Disp: 30 Tab, Rfl: 3    predniSONE (DELTASONE) 5 mg tablet, Take up to 7 tabs daily as directed in written instructions given., Disp: 28 Tab, Rfl: 1    DULoxetine (CYMBALTA) 20 mg capsule, Take 1 Cap by mouth daily. Indications: ANXIETY WITH DEPRESSION, Disp: 30 Cap, Rfl: 3    furosemide (LASIX) 40 mg tablet, Take 1 Tab by mouth daily. , Disp: 90 Tab, Rfl: 1    HYDROcodone-acetaminophen (NORCO) 7.5-325 mg per tablet, Take 1 Tab by mouth three (3) times daily as needed for Pain. Max Daily Amount: 3 Tabs., Disp: 90 Tab, Rfl: 0    warfarin (COUMADIN) 5 mg tablet, 1/2 tablet on Tues and Thurs and 1 tablet all other days  Indications: PREVENT THROMBOEMBOLISM IN CHRONIC ATRIAL FIBRILLATION, Disp: 30 Tab, Rfl: 3    traMADol (ULTRAM) 50 mg tablet, Take 1 Tab by mouth three (3) times daily as needed for Pain. Max Daily Amount: 150 mg.  Indications: Pain, Disp: 90 Tab, Rfl: 0    mometasone (ELOCON) 0.1 % topical cream, APPLY TO AFFECTED AREA DAILY, Disp: 15 g, Rfl: 0    traZODone (DESYREL) 50 mg tablet, Take 1 Tab by mouth nightly., Disp: 30 Tab, Rfl: 2    amLODIPine (NORVASC) 10 mg tablet, Take 1 Tab by mouth daily. , Disp: 30 Tab, Rfl: 5    insulin glargine (LANTUS) 100 unit/mL injection, INJECT 14 UNITS UNDER THE SKIN DAILY, Disp: 10 mL, Rfl: 5    lisinopril (PRINIVIL, ZESTRIL) 20 mg tablet, TAKE 1 TABLET BY MOUTH DAILY, Disp: 30 Tab, Rfl: 12    Insulin Syringe-Needle U-100 0.3 mL 30 x 1/2\" syrg, Use as directed, Disp: 100 Syringe, Rfl: 3    lansoprazole (PREVACID) 15 mg capsule, Take  by mouth Daily (before breakfast). , Disp: , Rfl:     BD INSULIN SYRINGE ULTRA-FINE 1/2 mL 30 x 1/2\" syrg, INJECT INSULIN DAILY, Disp: 100 Syringe, Rfl: 11    docusate sodium (STOOL SOFTENER) 100 mg capsule, Take 100 mg by mouth two (2) times daily as needed for Constipation. , Disp: , Rfl:     Past Medical History:   Diagnosis Date    [facility]     Atrial fibrillation ([facility] 12/2/2013    Cardiomegaly     CKD (chronic kidney disease)     COPD     Degenerative spondylolisthesis     Diabetes (HCC)     IDDM    GERD (gastroesophageal reflux disease)     Hyperlipidemia     Hypertension     Hypokalemia     Myalgia     Osteoarthritis of knee     Rheumatoid arthritis(714.0) 12/2/2013    Tachycardia     Venous insufficiency     Vitamin D deficiency        Past Surgical History:   Procedure Laterality Date    HX HYSTERECTOMY      PARTIAL HYSTERECTOMY       History   Smoking Status    Never Smoker   Smokeless Tobacco    Never Used       Social History     Social History    Marital status:      Spouse name: N/A    Number of children: N/A    Years of education: N/A     Social History Main Topics    Smoking status: Never Smoker    Smokeless tobacco: Never Used    Alcohol use No    Drug use: No    Sexual activity: Not Currently     Partners: Male     Birth control/ protection: None     Other Topics Concern     Service No    Blood Transfusions No    Caffeine Concern Yes    Occupational Exposure No    Hobby Hazards No    Sleep Concern No    Stress Concern No    Weight Concern Yes     losing weight    Special Diet No    Back Care Yes     pain    Exercise No    Bike Helmet No     n/a    Seat Belt No    Self-Exams No     Social History Narrative       Family History   Problem Relation Age of Onset    Diabetes Father     Diabetes Sister     Heart Disease Brother      ENLARGED HEART    Elevated Lipids Brother     Diabetes Sister          Objective:     Visit Vitals    /79 (BP 1 Location: Left arm, BP Patient Position: Sitting)    Pulse 79    Temp 97.8 °F (36.6 °C) (Temporal)    Resp 16    Ht 5' 2\" (1.575 m)    Wt 168 lb 3.2 oz (76.3 kg)    SpO2 97%    BMI 30.76 kg/m2     Body mass index is 30.76 kg/(m^2). General: Alert and oriented. No acute distress. Well nourished  Lungs[de-identified] clear to auscultation without wheezes, rales, or rhonchi. Heart :RRR, S1 & S2 are normal intensity. No murmur; no gallop  Extremities: without clubbing, cyanosis, or edema. Pulses: radial and femoral pulses are normal  Neuro: HMF intact. Cranial nerves II through XII grossly normal.  Motor: is 5 over 5 and symmetrical.   Deep tendon reflexes: +2 equal    Results for orders placed or performed in visit on 03/10/17   AMB POC PT/INR   Result Value Ref Range    VALID INTERNAL CONTROL POC Yes     Prothrombin time (POC) 49.0 sec    INR POC 4.1        Results for orders placed or performed in visit on 03/10/17   AMB POC PT/INR   Result Value Ref Range    VALID INTERNAL CONTROL POC Yes     Prothrombin time (POC) 49.0 sec    INR POC 4.1        Assessment/ Plan:     Peyton Hough was seen today for anticoagulation. Diagnoses and all orders for this visit:    Atrial fibrillation, unspecified type (HCC)  -     AMB POC PT/INR  -     COLLECTION CAPILLARY BLOOD SPECIMEN    Fibromyalgia  -     ALPRAZolam (XANAX) 0.25 mg tablet; Take 1 Tab by mouth two (2) times daily as needed for Anxiety. Max Daily Amount: 0.5 mg.  Indications: ANXIETY WITH DEPRESSION    Depression, unspecified depression type  -     DULoxetine (CYMBALTA) 20 mg capsule; Take 1 Cap by mouth daily. Indications: ANXIETY WITH DEPRESSION    Pain of left lower extremity  -     XR KNEE LT MAX 2 VWS; Future    Other orders  -     atenolol (TENORMIN) 50 mg tablet; Take 1 Tab by mouth daily. -     predniSONE (DELTASONE) 5 mg tablet; Take up to 7 tabs daily as directed in written instructions given. 1. Atrial fibrillation, unspecified type ([address]    2. Fibromyalgia    3. Depression, unspecified depression type    4. Pain of left lower extremity        Orders Placed This Encounter    COLLECTION CAPILLARY BLOOD SPECIMEN    XR KNEE LT MAX 2 VWS     Standing Status:   Future     Standing Expiration Date:   4/10/2018     Order Specific Question:   Reason for Exam     Answer:   leg pain in back of the knee     Order Specific Question:   Is Patient Allergic to Contrast Dye? Answer:   No     Order Specific Question:   Which facility to perform procedure? Answer:   [facility]    AMB POC PT/INR    ALPRAZolam (XANAX) 0.25 mg tablet     Sig: Take 1 Tab by mouth two (2) times daily as needed for Anxiety. Max Daily Amount: 0.5 mg. Indications: ANXIETY WITH DEPRESSION     Dispense:  45 Tab     Refill:  1    atenolol (TENORMIN) 50 mg tablet     Sig: Take 1 Tab by mouth daily. Dispense:  30 Tab     Refill:  3    predniSONE (DELTASONE) 5 mg tablet     Sig: Take up to 7 tabs daily as directed in written instructions given. Dispense:  28 Tab     Refill:  1    DULoxetine (CYMBALTA) 20 mg capsule     Sig: Take 1 Cap by mouth daily. Indications: ANXIETY WITH DEPRESSION     Dispense:  30 Cap     Refill:  3       Verbal and written instructions (see AVS) provided.  Patient expresses understanding of diagnosis and treatment plan. Zach Pickett, MICKIE                                                            Anticoagulation.  Dx:  Atrial fibrillation  Current symptoms: none  Current control agent: B-blocker  Current anticoagulant: warfarinheld currently restart on 3/17 2.5 mg daily   History of bleeding problems: none  Lab Results   Component Value Date/Time    INR, External 1.6 12/02/2016    INR, External 3.4/40.8 05/13/2016 11:55 AM    INR, External 2.3/28.0 04/29/2016 02:09 PM    INR POC 4.1 03/10/2017 11:02 AM    INR POC 96.0 03/07/2017 04:40 PM    INR POC 5.9 02/10/2017 04:32 PM   Recheck PT and INR in 2 weeks

## 2017-03-23 ENCOUNTER — TELEPHONE (OUTPATIENT)
Dept: FAMILY MEDICINE CLINIC | Age: 77
End: 2017-03-23

## 2017-03-23 DIAGNOSIS — M05.79 RHEUMATOID ARTHRITIS INVOLVING MULTIPLE SITES WITH POSITIVE RHEUMATOID FACTOR (HCC): Primary | ICD-10-CM

## 2017-03-23 DIAGNOSIS — M62.81 WEAKNESS OF TRUNK MUSCULATURE: ICD-10-CM

## 2017-03-23 DIAGNOSIS — M53.3 SACROILIAC JOINT DISEASE: ICD-10-CM

## 2017-03-23 NOTE — TELEPHONE ENCOUNTER
Patient is in need of [facility] AT [facility] for PT and ADLs. She fell today and daughter is concerned that she needs help inside.

## 2017-03-24 ENCOUNTER — TELEPHONE (OUTPATIENT)
Dept: FAMILY MEDICINE CLINIC | Age: 77
End: 2017-03-24

## 2017-03-24 NOTE — TELEPHONE ENCOUNTER
Home Health called and said it will probably be the end of the week before they can get there. Will send orders for Dr George Castillo to sign.

## 2017-03-24 NOTE — TELEPHONE ENCOUNTER
Called spoke with caregiver. She reports no injury after fall. .  referral has been faxed to [facility].

## 2017-03-27 ENCOUNTER — OFFICE VISIT (OUTPATIENT)
Dept: FAMILY MEDICINE CLINIC | Age: 77
End: 2017-03-27

## 2017-03-27 VITALS
OXYGEN SATURATION: 98 % | HEART RATE: 84 BPM | RESPIRATION RATE: 26 BRPM | TEMPERATURE: 96.8 F | SYSTOLIC BLOOD PRESSURE: 98 MMHG | DIASTOLIC BLOOD PRESSURE: 66 MMHG | HEIGHT: 62 IN

## 2017-03-27 DIAGNOSIS — J04.0 LARYNGITIS: ICD-10-CM

## 2017-03-27 DIAGNOSIS — M54.50 CHRONIC MIDLINE LOW BACK PAIN WITHOUT SCIATICA: ICD-10-CM

## 2017-03-27 DIAGNOSIS — M05.79 RHEUMATOID ARTHRITIS INVOLVING MULTIPLE SITES WITH POSITIVE RHEUMATOID FACTOR (HCC): ICD-10-CM

## 2017-03-27 DIAGNOSIS — I48.91 ATRIAL FIBRILLATION, UNSPECIFIED TYPE (HCC): Primary | ICD-10-CM

## 2017-03-27 DIAGNOSIS — J02.9 SORE THROAT: ICD-10-CM

## 2017-03-27 DIAGNOSIS — Z00.00 ENCOUNTER FOR MEDICARE ANNUAL WELLNESS EXAM: ICD-10-CM

## 2017-03-27 DIAGNOSIS — G89.29 CHRONIC MIDLINE LOW BACK PAIN WITHOUT SCIATICA: ICD-10-CM

## 2017-03-27 DIAGNOSIS — M47.817 SPONDYLOSIS OF LUMBOSACRAL REGION, UNSPECIFIED SPINAL OSTEOARTHRITIS COMPLICATION STATUS: ICD-10-CM

## 2017-03-27 DIAGNOSIS — J40 BRONCHITIS: ICD-10-CM

## 2017-03-27 LAB
INR BLD: 2.2
PT POC: 26.8 SEC
QUICKVUE INFLUENZA TEST: NEGATIVE
S PYO AG THROAT QL: NEGATIVE
VALID INTERNAL CONTROL?: YES

## 2017-03-27 RX ORDER — HYDROCODONE BITARTRATE AND ACETAMINOPHEN 7.5; 325 MG/1; MG/1
1 TABLET ORAL
Qty: 90 TAB | Refills: 0 | Status: CANCELLED | OUTPATIENT
Start: 2017-03-27

## 2017-03-27 RX ORDER — METHYLPREDNISOLONE ACETATE 40 MG/ML
40 INJECTION, SUSPENSION INTRA-ARTICULAR; INTRALESIONAL; INTRAMUSCULAR; SOFT TISSUE ONCE
Qty: 1 ML | Refills: 0
Start: 2017-03-27 | End: 2017-03-27

## 2017-03-27 RX ORDER — TRAMADOL HYDROCHLORIDE 50 MG/1
50 TABLET ORAL
Qty: 90 TAB | Refills: 0 | Status: CANCELLED | OUTPATIENT
Start: 2017-03-27

## 2017-03-27 NOTE — MR AVS SNAPSHOT
Visit Information Date & Time Provider Department Dept. Phone Encounter #  
 3/27/2017  1:00 PM Riki Key NP [address] [phone] [id number] Upcoming Health Maintenance Date Due MICROALBUMIN Q1 1/25/1950 OSTEOPOROSIS SCREENING (DEXA) 1/25/2003 FOOT EXAM Q1 10/19/2016 LIPID PANEL Q1 4/7/2017 Pneumococcal 65+ High/Highest Risk (2 of 2 - PPSV23) 5/2/2017 EYE EXAM RETINAL OR DILATED Q1 5/19/2017 HEMOGLOBIN A1C Q6M 8/10/2017 MEDICARE YEARLY EXAM 3/28/2018 GLAUCOMA SCREENING Q2Y 5/19/2018 DTaP/Tdap/Td series (2 - Td) 3/7/2027 Allergies as of 3/27/2017  Review Complete On: 3/27/2017 By: Cortes Parsons RN Severity Noted Reaction Type Reactions Statins-hmg-coa Reductase Inhibitors  10/19/2015    Myalgia Current Immunizations  Never Reviewed No immunizations on file. Not reviewed this visit You Were Diagnosed With   
  
 Codes Comments Atrial fibrillation, unspecified type ([facility] [address]    -  Primary ICD-10-CM: I48.91 
ICD-9-CM: 427.31 Spondylosis of lumbosacral region, unspecified spinal osteoarthritis complication status     ONK-42-UO: M47.817 ICD-9-CM: 721.3 Rheumatoid arthritis involving multiple sites with positive rheumatoid factor (HCC)     ICD-10-CM: M05.79 ICD-9-CM: 714.0 Chronic midline low back pain without sciatica     ICD-10-CM: M54.5, G89.29 ICD-9-CM: 724.2, 338.29 Bronchitis     ICD-10-CM: J40 ICD-9-CM: 831 Laryngitis     ICD-10-CM: J04.0 ICD-9-CM: 464.00 Sore throat     ICD-10-CM: J02.9 ICD-9-CM: 956 Vitals BP Pulse Temp Resp Height(growth percentile) SpO2  
 98/66 (BP 1 Location: Left arm, BP Patient Position: Sitting) 84 96.8 °F (36 °C) (Temporal) 26 5' 2\" (1.575 m) 98% OB Status Smoking Status Postmenopausal Never Smoker Preferred Pharmacy Pharmacy Name Phone [facility], [address] [facility] AT [facility] [address] [facility] [phone] Your Updated Medication List  
  
   
This list is accurate as of: 3/27/17  3:12 PM.  Always use your most recent med list.  
  
  
  
  
 ALPRAZolam 0.25 mg tablet Commonly known as:  Volanda Pronto Take 1 Tab by mouth two (2) times daily as needed for Anxiety. Max Daily Amount: 0.5 mg. Indications: ANXIETY WITH DEPRESSION  
  
 amLODIPine 10 mg tablet Commonly known as:  Horris Bills Take 1 Tab by mouth daily. atenolol 50 mg tablet Commonly known as:  TENORMIN Take 1 Tab by mouth daily. BD INSULIN SYRINGE ULTRA-FINE 1/2 mL 30 gauge x 1/2\" Syrg Generic drug:  Insulin Syringe-Needle U-100 INJECT INSULIN DAILY DULoxetine 20 mg capsule Commonly known as:  CYMBALTA Take 1 Cap by mouth daily. Indications: ANXIETY WITH DEPRESSION  
  
 furosemide 40 mg tablet Commonly known as:  LASIX Take 1 Tab by mouth daily. HYDROcodone-acetaminophen 7.5-325 mg per tablet Commonly known as:  Lenon Gauze Take 1 Tab by mouth three (3) times daily as needed for Pain. Max Daily Amount: 3 Tabs. insulin glargine 100 unit/mL injection Commonly known as:  LANTUS INJECT 14 UNITS UNDER THE SKIN DAILY Insulin Syringe-Needle U-100 0.3 mL 30 gauge x 1/2\" Syrg Use as directed  
  
 lansoprazole 15 mg capsule Commonly known as:  PREVACID Take  by mouth Daily (before breakfast). lisinopril 20 mg tablet Commonly known as:  PRINIVIL, ZESTRIL  
TAKE 1 TABLET BY MOUTH DAILY  
  
 methylPREDNISolone acetate 40 mg/mL injection Commonly known as:  DEPO-Medrol 1 mL by Intra artICUlar route once for 1 dose. Indications: bronchitis  
  
 mometasone 0.1 % topical cream  
Commonly known as:  ELOCON  
APPLY TO AFFECTED AREA DAILY predniSONE 5 mg tablet Commonly known as:  Delilah Vera Take up to 7 tabs daily as directed in written instructions given. STOOL SOFTENER 100 mg capsule Generic drug:  docusate sodium Take 100 mg by mouth two (2) times daily as needed for Constipation. traMADol 50 mg tablet Commonly known as:  ULTRAM  
Take 1 Tab by mouth three (3) times daily as needed for Pain. Max Daily Amount: 150 mg. Indications: Pain  
  
 traZODone 50 mg tablet Commonly known as:  Oletta Alessandra Take 1 Tab by mouth nightly. warfarin 5 mg tablet Commonly known as:  COUMADIN  
1/2 tablet on Tues and Thurs and 1 tablet all other days  Indications: PREVENT THROMBOEMBOLISM IN CHRONIC ATRIAL FIBRILLATION We Performed the Following AMB POC PT/INR [29997 CPT(R)] AMB POC RAPID INFLUENZA TEST [38515 CPT(R)] AMB POC RAPID STREP A [10508 CPT(R)] COLLECTION CAPILLARY BLOOD SPECIMEN [57982 CPT(R)] DRAIN/INJECT LARGE JOINT/BURSA [id number] CPT(R)] METHYLPREDNISOLONE ACETATE INJECTION 40 MG [ [facility]] THER/PROPH/DIAG INJECTION, SUBCUT/IM [id number] CPT(R)] To-Do List   
 03/27/2017 Imaging:  XR CHEST PA LAT Introducing [facility]! [facility] [facility] introduces Saw[employer] patient portal. Now you can access parts of your medical record, email your doctor's office, and request medication refills online. 1. In your internet browser, go to [email or web address]. [email or web address] 2. Click on the First Time User? Click Here link in the Sign In box. You will see the New Member Sign Up page. 3. Enter your Sawerly Access Code exactly as it appears below. You will not need to use this code after youve completed the sign-up process. If you do not sign up before the expiration date, you must request a new code. · Sawer[employer] Access Code: [email or web address] Expires: 6/25/2017  3:12 PM 
 
4. Enter the last four digits of your Social Security Number (xxxx) and Date of Birth (mm/dd/yyyy) as indicated and click Submit. You will be taken to the next sign-up page. 5. Create a Edicy ID. This will be your Edicy login ID and cannot be changed, so think of one that is secure and easy to remember. 6. Create a Edicy password. You can change your password at any time. 7. Enter your Password Reset Question and Answer. This can be used at a later time if you forget your password. 8. Enter your e-mail address. You will receive e-mail notification when new information is available in [address] 9. Click Sign Up. You can now view and download portions of your medical record. 10. Click the Download Summary menu link to download a portable copy of your medical information. If you have questions, please visit the Frequently Asked Questions section of the Edicy website. Remember, Edicy is NOT to be used for urgent needs. For medical emergencies, dial 911. Now available from your iPhone and Android! Please provide this summary of care documentation to your next provider. Your primary care clinician is listed as Amanda Caraballo. If you have any questions after today's visit, please call [phone].

## 2017-03-28 NOTE — PROGRESS NOTES
Subjective:     Ashanti Sanz is a 76 y.o. female who presents today with the following:  Chief Complaint   Patient presents with    Annual Wellness Visit     initial    Anticoagulation   Mimi Senters feeling better overall  except for a sore throat and cold since Friday. Feeling fatigue daughter had the flu last week. Treating with OTC generic preparations to treat the symptoms. ROS:  Gen: denies fever, chills,positive  Fatigue,denies  weight loss, weight gain  HEENT:denies blurry vision, nasal congestion, positive sore throat  Resp: denies dypsnea, positive cough, wheezing  CV: denies chest pain radiating to the jaws or arms, palpitations, lower extremity edema  Abd: denies nausea, vomiting, diarrhea, constipation  Neuro: denies numbness/tingling  Endo: denies polyuria, polydipsia, heat/cold intolerance  Heme: no lymphadenopathy    Allergies   Allergen Reactions    Statins-Hmg-Coa Reductase Inhibitors Myalgia         Current Outpatient Prescriptions:     methylPREDNISolone acetate (DEPO-MEDROL) 40 mg/mL injection, 1 mL by Intra artICUlar route once for 1 dose. Indications: bronchitis, Disp: 1 mL, Rfl: 0    ALPRAZolam (XANAX) 0.25 mg tablet, Take 1 Tab by mouth two (2) times daily as needed for Anxiety. Max Daily Amount: 0.5 mg. Indications: ANXIETY WITH DEPRESSION, Disp: 45 Tab, Rfl: 1    atenolol (TENORMIN) 50 mg tablet, Take 1 Tab by mouth daily. , Disp: 30 Tab, Rfl: 3    predniSONE (DELTASONE) 5 mg tablet, Take up to 7 tabs daily as directed in written instructions given., Disp: 28 Tab, Rfl: 1    DULoxetine (CYMBALTA) 20 mg capsule, Take 1 Cap by mouth daily. Indications: ANXIETY WITH DEPRESSION, Disp: 30 Cap, Rfl: 3    furosemide (LASIX) 40 mg tablet, Take 1 Tab by mouth daily. , Disp: 90 Tab, Rfl: 1    HYDROcodone-acetaminophen (NORCO) 7.5-325 mg per tablet, Take 1 Tab by mouth three (3) times daily as needed for Pain.  Max Daily Amount: 3 Tabs., Disp: 90 Tab, Rfl: 0    warfarin (COUMADIN) 5 mg tablet, 1/2 tablet on Tues and Thurs and 1 tablet all other days  Indications: PREVENT THROMBOEMBOLISM IN CHRONIC ATRIAL FIBRILLATION, Disp: 30 Tab, Rfl: 3    traMADol (ULTRAM) 50 mg tablet, Take 1 Tab by mouth three (3) times daily as needed for Pain. Max Daily Amount: 150 mg. Indications: Pain, Disp: 90 Tab, Rfl: 0    mometasone (ELOCON) 0.1 % topical cream, APPLY TO AFFECTED AREA DAILY, Disp: 15 g, Rfl: 0    traZODone (DESYREL) 50 mg tablet, Take 1 Tab by mouth nightly., Disp: 30 Tab, Rfl: 2    amLODIPine (NORVASC) 10 mg tablet, Take 1 Tab by mouth daily. , Disp: 30 Tab, Rfl: 5    insulin glargine (LANTUS) 100 unit/mL injection, INJECT 14 UNITS UNDER THE SKIN DAILY, Disp: 10 mL, Rfl: 5    lisinopril (PRINIVIL, ZESTRIL) 20 mg tablet, TAKE 1 TABLET BY MOUTH DAILY, Disp: 30 Tab, Rfl: 12    Insulin Syringe-Needle U-100 0.3 mL 30 x 1/2\" syrg, Use as directed, Disp: 100 Syringe, Rfl: 3    lansoprazole (PREVACID) 15 mg capsule, Take  by mouth Daily (before breakfast). , Disp: , Rfl:     BD INSULIN SYRINGE ULTRA-FINE 1/2 mL 30 x 1/2\" syrg, INJECT INSULIN DAILY, Disp: 100 Syringe, Rfl: 11    docusate sodium (STOOL SOFTENER) 100 mg capsule, Take 100 mg by mouth two (2) times daily as needed for Constipation. , Disp: , Rfl:     Past Medical History:   Diagnosis Date    Atrial fibrillation ([address]     Atrial fibrillation ([address] 12/2/2013    Cardiomegaly     CKD (chronic kidney disease)     COPD     Degenerative spondylolisthesis     Diabetes (HCC)     IDDM    GERD (gastroesophageal reflux disease)     Hyperlipidemia     Hypertension     Hypokalemia     Myalgia     Osteoarthritis of knee     Rheumatoid arthritis(714.0) 12/2/2013    Tachycardia     Venous insufficiency     Vitamin D deficiency        Past Surgical History:   Procedure Laterality Date    HX HYSTERECTOMY      PARTIAL HYSTERECTOMY       History   Smoking Status    Never Smoker   Smokeless Tobacco    Never Used       Social History Social History    Marital status:      Spouse name: N/A    Number of children: N/A    Years of education: N/A     Social History Main Topics    Smoking status: Never Smoker    Smokeless tobacco: Never Used    Alcohol use No    Drug use: No    Sexual activity: Not Currently     Partners: Male     Birth control/ protection: None     Other Topics Concern     Service No    Blood Transfusions No    Caffeine Concern Yes    Occupational Exposure No    Hobby Hazards No    Sleep Concern No    Stress Concern No    Weight Concern Yes     losing weight    Special Diet No    Back Care Yes     pain    Exercise No    Bike Helmet No     n/a    Seat Belt No    Self-Exams No     Social History Narrative       Family History   Problem Relation Age of Onset    Diabetes Father     Diabetes Sister     Heart Disease Brother      ENLARGED HEART    Elevated Lipids Brother     Diabetes Sister          Objective:     Visit Vitals    BP 98/66 (BP 1 Location: Left arm, BP Patient Position: Sitting)    Pulse 84    Temp 96.8 °F (36 °C) (Temporal)    Resp 26    Ht 5' 2\" (1.575 m)    SpO2 98%     There is no height or weight on file to calculate BMI. General: Alert and oriented. No acute distress. Well nourished  HEENT :  Ears:TMs are normal. Canals are clear. Eyes: pupils equal, round, react to light and accommodation. Extra ocular movements intact  . Nose: patent. Mouth and throat is clear. Neck:supple full range of motion no thyromegaly. Trachea midline, No carotid bruits. No significant lymphadenopathy  Lungs[de-identified] Mild expiratory  wheezes, and rales, negative rhonchi. Heart :RRR, S1 & S2 are normal intensity. No murmur; no gallop  Abdomen: bowel sounds active. No tenderness, guarding, rebound, masses, hepatic or spleen enlargement  Back: no CVA tenderness. Extremities: without clubbing, cyanosis, or edema  Pulses: radial and femoral pulses are normal  Neuro: HMF intact.  Cranial nerves II through XII grossly normal.  Motor: is 3 over 5 and symmetrical.   Deep tendon reflexes: +2 equal    Results for orders placed or performed in visit on 03/27/17   AMB POC PT/INR   Result Value Ref Range    VALID INTERNAL CONTROL POC Yes     Prothrombin time (POC) 26.8 sec    INR POC 2.2    AMB POC RAPID STREP A   Result Value Ref Range    VALID INTERNAL CONTROL POC Yes     Group A Strep Ag Negative Negative   AMB POC RAPID INFLUENZA TEST   Result Value Ref Range    VALID INTERNAL CONTROL POC Yes     QuickVue Influenza test Negative Negative       Results for orders placed or performed in visit on 03/27/17   AMB POC PT/INR   Result Value Ref Range    VALID INTERNAL CONTROL POC Yes     Prothrombin time (POC) 26.8 sec    INR POC 2.2    AMB POC RAPID STREP A   Result Value Ref Range    VALID INTERNAL CONTROL POC Yes     Group A Strep Ag Negative Negative   AMB POC RAPID INFLUENZA TEST   Result Value Ref Range    VALID INTERNAL CONTROL POC Yes     QuickVue Influenza test Negative Negative       Assessment/ Plan:     Mimi Juarez was seen today for annual wellness visit and anticoagulation. Diagnoses and all orders for this visit:    Atrial fibrillation, unspecified type ([facility] [address]  -     COLLECTION CAPILLARY BLOOD SPECIMEN  -     AMB POC PT/INR    Spondylosis of lumbosacral region, unspecified spinal osteoarthritis complication status    Rheumatoid arthritis involving multiple sites with positive rheumatoid factor (HCC)    Chronic midline low back pain without sciatica    Bronchitis  -     XR CHEST PA LAT; Future  -     DRAIN/INJECT LARGE JOINT/BURSA  -     METHYLPREDNISOLONE ACETATE INJECTION 40 MG  -     THER/PROPH/DIAG INJECTION, SUBCUT/IM    Laryngitis  -     AMB POC RAPID STREP A  -     AMB POC RAPID INFLUENZA TEST    Sore throat  -     AMB POC RAPID STREP A  -     AMB POC RAPID INFLUENZA TEST    Encounter for Medicare annual wellness exam    Other orders  -     Cancel: traMADol (ULTRAM) 50 mg tablet;  Take 1 Tab by mouth three (3) times daily as needed for Pain. Max Daily Amount: 150 mg. Indications: Pain  -     Cancel: HYDROcodone-acetaminophen (NORCO) 7.5-325 mg per tablet; Take 1 Tab by mouth three (3) times daily as needed for Pain. Max Daily Amount: 3 Tabs. -     methylPREDNISolone acetate (DEPO-MEDROL) 40 mg/mL injection; 1 mL by Intra artICUlar route once for 1 dose. Indications: bronchitis         1. Atrial fibrillation, unspecified type ([address]    2. Spondylosis of lumbosacral region, unspecified spinal osteoarthritis complication status    3. Rheumatoid arthritis involving multiple sites with positive rheumatoid factor ([facility] [address]    4. Chronic midline low back pain without sciatica    5. Bronchitis    6. Laryngitis    7. Sore throat    8. Encounter for Medicare annual wellness exam        Orders Placed This Encounter    COLLECTION CAPILLARY BLOOD SPECIMEN    DRAIN/INJECT LARGE JOINT/BURSA    THER/PROPH/DIAG INJECTION, SUBCUT/IM    XR CHEST PA LAT     Standing Status:   Future     Standing Expiration Date:   4/27/2018     Order Specific Question:   Reason for Exam     Answer:   chest congestion     Order Specific Question:   Is Patient Allergic to Contrast Dye? Answer:   Unknown     Order Specific Question:   Which facility to perform procedure? Answer:   [facility]    AMB POC PT/INR    AMB POC RAPID STREP A    AMB POC RAPID INFLUENZA TEST    METHYLPREDNISOLONE ACETATE INJECTION 40 MG     Order Specific Question:   Dose     Answer:   40 mg     Order Specific Question:   Site     Answer:   RIGHT DELTOID     Order Specific Question:   Expiration Date     Answer:   11/30/2017     Order Specific Question:   Lot#     Answer:   [id number]     Order Specific Question:        Answer:   Brandon Loomis     Order Specific Question:   Charge Quantity? Answer:   1     Order Specific Question:   Perfomed by/Witnessed by:      Answer:   kelsey galan     Order Specific Question:   NDC#     Answer:   5915-1181-09    methylPREDNISolone acetate (DEPO-MEDROL) 40 mg/mL injection     Si mL by Intra artICUlar route once for 1 dose. Indications: bronchitis     Dispense:  1 mL     Refill:  0     Incentive Spirometer : Decreased mobility due to rheumatoid arthritis, lumbosacral spondylosis, fibromyalgia, weakness of trunk musculature. Wheel chair  Decreased mobility due to rheumatoid arthritis, lumbosacral spondylosis, fibromyalgia, weakness of trunk musculature. Verbal and written instructions (see AVS) provided.  Patient expresses understanding of diagnosis and treatment plan. MICKIE Lemons            Verner Hood is a 76 y.o. female and presents for annual Medicare Wellness Visit. Problem List: Reviewed with patient and discussed risk factors.     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, 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    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    Chronic narcotic use F11.90       Current medical providers:  Patient Care Team:  NICOLA Florez as PCP - General (Physician Assistant)    [address] [place], [place] [address]: Reviewed with patient  Past Surgical History:   Procedure Laterality Date    HX HYSTERECTOMY      PARTIAL HYSTERECTOMY        SH: Reviewed with patient  Social History   Substance Use Topics    Smoking status: Never Smoker    Smokeless tobacco: Never Used    Alcohol use No       FH: Reviewed with patient  Family History   Problem Relation Age of Onset    Diabetes Father     Diabetes Sister     Heart Disease Brother      ENLARGED HEART    Elevated Lipids Brother     Diabetes Sister        Medications/Allergies: Reviewed with patient  Current Outpatient Prescriptions on File Prior to Visit   Medication Sig Dispense Refill    ALPRAZolam (XANAX) 0.25 mg tablet Take 1 Tab by mouth two (2) times daily as needed for Anxiety. Max Daily Amount: 0.5 mg. Indications: ANXIETY WITH DEPRESSION 45 Tab 1    atenolol (TENORMIN) 50 mg tablet Take 1 Tab by mouth daily. 30 Tab 3    predniSONE (DELTASONE) 5 mg tablet Take up to 7 tabs daily as directed in written instructions given. 28 Tab 1    DULoxetine (CYMBALTA) 20 mg capsule Take 1 Cap by mouth daily. Indications: ANXIETY WITH DEPRESSION 30 Cap 3    furosemide (LASIX) 40 mg tablet Take 1 Tab by mouth daily. 90 Tab 1    HYDROcodone-acetaminophen (NORCO) 7.5-325 mg per tablet Take 1 Tab by mouth three (3) times daily as needed for Pain. Max Daily Amount: 3 Tabs. 90 Tab 0    warfarin (COUMADIN) 5 mg tablet 1/2 tablet on Tues and Thurs and 1 tablet all other days  Indications: PREVENT THROMBOEMBOLISM IN CHRONIC ATRIAL FIBRILLATION 30 Tab 3    traMADol (ULTRAM) 50 mg tablet Take 1 Tab by mouth three (3) times daily as needed for Pain. Max Daily Amount: 150 mg. Indications: Pain 90 Tab 0    mometasone (ELOCON) 0.1 % topical cream APPLY TO AFFECTED AREA DAILY 15 g 0    traZODone (DESYREL) 50 mg tablet Take 1 Tab by mouth nightly. 30 Tab 2    amLODIPine (NORVASC) 10 mg tablet Take 1 Tab by mouth daily. 30 Tab 5    insulin glargine (LANTUS) 100 unit/mL injection INJECT 14 UNITS UNDER THE SKIN DAILY 10 mL 5    lisinopril (PRINIVIL, ZESTRIL) 20 mg tablet TAKE 1 TABLET BY MOUTH DAILY 30 Tab 12    Insulin Syringe-Needle U-100 0.3 mL 30 x 1/2\" syrg Use as directed 100 Syringe 3    lansoprazole (PREVACID) 15 mg capsule Take  by mouth Daily (before breakfast).       BD INSULIN SYRINGE ULTRA-FINE 1/2 mL 30 x 1/2\" syrg INJECT INSULIN DAILY 100 Syringe 11    docusate sodium (STOOL SOFTENER) 100 mg capsule Take 100 mg by mouth two (2) times daily as needed for Constipation. No current facility-administered medications on file prior to visit. Allergies   Allergen Reactions    Statins-Hmg-Coa Reductase Inhibitors Myalgia       Objective:  Visit Vitals    BP 98/66 (BP 1 Location: Left arm, BP Patient Position: Sitting)    Pulse 84    Temp 96.8 °F (36 °C) (Temporal)    Resp 26    Ht 5' 2\" (1.575 m)    SpO2 98%    There is no height or weight on file to calculate BMI. Assessment of cognitive impairment: Alert and oriented x 3    Depression Screen:   PHQ 2 / 9, over the last two weeks 3/10/2017   Little interest or pleasure in doing things Not at all   Feeling down, depressed or hopeless Not at all   Total Score PHQ 2 0       Fall Risk Assessment:    Fall Risk Assessment, last 12 mths 3/10/2017   Able to walk? Yes   Fall in past 12 months? No   Number of falls in past 12 months -       Functional Ability:   Does the patient exhibit a steady gait? Fatigued today using a wheel chair   How long did it take the patient to get up and walk from a sitting position? n/a   Is the patient self reliant?  (ie can do own laundry, meals, household chores)  no     Does the patient handle his/her own medications?  no     Does the patient handle his/her own money? no     Is the patients home safe (ie good lighting, handrails on stairs and bath, etc.)? yes     Did you notice or did patient express any hearing difficulties? no     Did you notice or did patient express any vision difficulties?   no     Were distance and reading eye charts used?   no       Advance Care Planning:   Patient was offered the opportunity to discuss advance care planning:  yes     Does patient have an Advance Directive:  no   If no, did you provide information on Caring Connections?  no       Plan:      Orders Placed This Encounter    COLLECTION CAPILLARY BLOOD SPECIMEN    DRAIN/INJECT LARGE JOINT/BURSA    THER/PROPH/DIAG INJECTION, SUBCUT/IM    XR CHEST PA LAT    AMB POC PT/INR    AMB POC RAPID STREP A    AMB POC RAPID INFLUENZA TEST    METHYLPREDNISOLONE ACETATE INJECTION 40 MG    methylPREDNISolone acetate (DEPO-MEDROL) 40 mg/mL injection       Health Maintenance   Topic Date Due    MICROALBUMIN Q1  01/25/1950    OSTEOPOROSIS SCREENING (DEXA)  01/25/2003    FOOT EXAM Q1  10/19/2016    LIPID PANEL Q1  04/07/2017    Pneumococcal 65+ High/Highest Risk (2 of 2 - PPSV23) 05/02/2017    EYE EXAM RETINAL OR DILATED Q1  05/19/2017    HEMOGLOBIN A1C Q6M  08/10/2017    MEDICARE YEARLY EXAM  03/28/2018    GLAUCOMA SCREENING Q2Y  05/19/2018    DTaP/Tdap/Td series (2 - Td) 03/07/2027    ZOSTER VACCINE AGE 60>  Addressed    INFLUENZA AGE 9 TO ADULT  Addressed       *Patient verbalized understanding and agreement with the plan. A copy of the After Visit Summary with personalized health plan was given to the patient today. Anticoagulation. Dx:  Atrial fibrillation.    Current symptoms: none  Current control agent: B-blocker  Current anticoagulant: warfarin2.5 mg on Tuesday and Thursday and 5 mg the remaining days  History of bleeding problems: none  Lab Results   Component Value Date/Time    INR, External 1.6 12/02/2016    INR, External 3.4/40.8 05/13/2016 11:55 AM    INR, External 2.3/28.0 04/29/2016 02:09 PM    INR POC 2.2 03/27/2017 01:25 PM    INR POC 4.1 03/10/2017 11:02 AM    INR POC 96.0 03/07/2017 04:40 PM       Lucio Womack NP

## 2017-03-31 ENCOUNTER — TELEPHONE (OUTPATIENT)
Dept: FAMILY MEDICINE CLINIC | Age: 77
End: 2017-03-31

## 2017-03-31 DIAGNOSIS — G89.29 CHRONIC MIDLINE LOW BACK PAIN WITHOUT SCIATICA: ICD-10-CM

## 2017-03-31 DIAGNOSIS — M05.79 RHEUMATOID ARTHRITIS INVOLVING MULTIPLE SITES WITH POSITIVE RHEUMATOID FACTOR (HCC): ICD-10-CM

## 2017-03-31 DIAGNOSIS — M79.7 FIBROMYALGIA: ICD-10-CM

## 2017-03-31 DIAGNOSIS — M54.50 CHRONIC MIDLINE LOW BACK PAIN WITHOUT SCIATICA: ICD-10-CM

## 2017-03-31 DIAGNOSIS — M47.817 SPONDYLOSIS OF LUMBOSACRAL REGION, UNSPECIFIED SPINAL OSTEOARTHRITIS COMPLICATION STATUS: ICD-10-CM

## 2017-03-31 DIAGNOSIS — M53.3 SACROILIAC JOINT DISEASE: ICD-10-CM

## 2017-03-31 DIAGNOSIS — F11.90 CHRONIC NARCOTIC USE: ICD-10-CM

## 2017-03-31 DIAGNOSIS — M05.79 RHEUMATOID ARTHRITIS INVOLVING MULTIPLE SITES WITH POSITIVE RHEUMATOID FACTOR (HCC): Primary | ICD-10-CM

## 2017-03-31 RX ORDER — TRAMADOL HYDROCHLORIDE 50 MG/1
50 TABLET ORAL
Qty: 90 TAB | Refills: 0 | Status: SHIPPED | OUTPATIENT
Start: 2017-03-31 | End: 2017-05-02 | Stop reason: SDUPTHER

## 2017-03-31 RX ORDER — HYDROCODONE BITARTRATE AND ACETAMINOPHEN 7.5; 325 MG/1; MG/1
1 TABLET ORAL
Qty: 90 TAB | Refills: 0 | Status: SHIPPED | OUTPATIENT
Start: 2017-03-31 | End: 2017-05-04 | Stop reason: DRUGHIGH

## 2017-03-31 NOTE — TELEPHONE ENCOUNTER
patient and daughter Jeremy Bowles  informed to come in for pain agreement form,urine drug Screen prior to getting pain RX.  They will come in monday

## 2017-04-03 ENCOUNTER — TELEPHONE (OUTPATIENT)
Dept: FAMILY MEDICINE CLINIC | Age: 77
End: 2017-04-03

## 2017-04-03 ENCOUNTER — HOME HEALTH ADMISSION (OUTPATIENT)
Dept: HOME HEALTH SERVICES | Facility: HOME HEALTH | Age: 77
End: 2017-04-03
Payer: MEDICARE

## 2017-04-03 ENCOUNTER — CLINICAL SUPPORT (OUTPATIENT)
Dept: FAMILY MEDICINE CLINIC | Age: 77
End: 2017-04-03

## 2017-04-03 DIAGNOSIS — Z76.0 MEDICATION REFILL: Primary | ICD-10-CM

## 2017-04-03 NOTE — TELEPHONE ENCOUNTER
Patient's son, Pamela Johnson (he is NOT on her disclosure), comes in asking if we could defer the urine drug screen today because Mrs. Azul's son is very sick and they still do not have a wheelchair ramp to get her in and out of the house.

## 2017-04-03 NOTE — TELEPHONE ENCOUNTER
Pt's son states pt is unable to walk to get here for urine test. The son that helps her/holds her up and down the stairs had surgery on his arm. They are having a ramp built for the wheel chair but not for 2 weeks. Pt has been in bed since Friday. Has been out of pain meds since last Friday. Has no help please advise.

## 2017-04-04 ENCOUNTER — HOME CARE VISIT (OUTPATIENT)
Dept: HOME HEALTH SERVICES | Facility: HOME HEALTH | Age: 77
End: 2017-04-04
Payer: MEDICARE

## 2017-04-04 PROCEDURE — G0299 HHS/HOSPICE OF RN EA 15 MIN: HCPCS

## 2017-04-04 PROCEDURE — 3331090002 HH PPS REVENUE DEBIT

## 2017-04-04 PROCEDURE — 400013 HH SOC

## 2017-04-04 PROCEDURE — 3331090001 HH PPS REVENUE CREDIT

## 2017-04-04 RX ORDER — MOMETASONE FUROATE 1 MG/G
CREAM TOPICAL
Qty: 15 G | Refills: 0 | Status: SHIPPED | OUTPATIENT
Start: 2017-04-04 | End: 2017-05-31 | Stop reason: SDUPTHER

## 2017-04-05 VITALS
HEIGHT: 62 IN | DIASTOLIC BLOOD PRESSURE: 76 MMHG | SYSTOLIC BLOOD PRESSURE: 130 MMHG | HEART RATE: 76 BPM | OXYGEN SATURATION: 97 % | RESPIRATION RATE: 18 BRPM | BODY MASS INDEX: 30.91 KG/M2 | WEIGHT: 168 LBS

## 2017-04-05 PROCEDURE — 3331090001 HH PPS REVENUE CREDIT

## 2017-04-05 PROCEDURE — 3331090002 HH PPS REVENUE DEBIT

## 2017-04-06 ENCOUNTER — HOME CARE VISIT (OUTPATIENT)
Dept: SCHEDULING | Facility: HOME HEALTH | Age: 77
End: 2017-04-06
Payer: MEDICARE

## 2017-04-06 VITALS
SYSTOLIC BLOOD PRESSURE: 118 MMHG | TEMPERATURE: 98.1 F | RESPIRATION RATE: 20 BRPM | OXYGEN SATURATION: 97 % | HEART RATE: 69 BPM | DIASTOLIC BLOOD PRESSURE: 64 MMHG

## 2017-04-06 PROCEDURE — 3331090001 HH PPS REVENUE CREDIT

## 2017-04-06 PROCEDURE — G0299 HHS/HOSPICE OF RN EA 15 MIN: HCPCS

## 2017-04-06 PROCEDURE — 3331090002 HH PPS REVENUE DEBIT

## 2017-04-07 PROCEDURE — 3331090002 HH PPS REVENUE DEBIT

## 2017-04-07 PROCEDURE — 3331090001 HH PPS REVENUE CREDIT

## 2017-04-08 PROCEDURE — 3331090002 HH PPS REVENUE DEBIT

## 2017-04-08 PROCEDURE — 3331090001 HH PPS REVENUE CREDIT

## 2017-04-09 LAB
AMPHETAMINES UR QL SCN: NEGATIVE NG/ML
BARBITURATES UR QL SCN: NEGATIVE NG/ML
BENZODIAZ UR QL: POSITIVE
BZE UR QL: NEGATIVE NG/ML
CANNABINOIDS UR QL SCN: NEGATIVE NG/ML
MDMA UR QL SCN: NEGATIVE NG/ML
METHADONE UR QL SCN: NEGATIVE NG/ML
METHAQUALONE UR QL: NEGATIVE NG/ML
OPIATES UR QL: NEGATIVE NG/ML
PCP UR QL: NEGATIVE NG/ML
PROPOXYPH UR QL: NEGATIVE NG/ML

## 2017-04-09 PROCEDURE — 3331090002 HH PPS REVENUE DEBIT

## 2017-04-09 PROCEDURE — 3331090001 HH PPS REVENUE CREDIT

## 2017-04-10 PROCEDURE — 3331090002 HH PPS REVENUE DEBIT

## 2017-04-10 PROCEDURE — 3331090001 HH PPS REVENUE CREDIT

## 2017-04-11 ENCOUNTER — HOME CARE VISIT (OUTPATIENT)
Dept: HOME HEALTH SERVICES | Facility: HOME HEALTH | Age: 77
End: 2017-04-11
Payer: MEDICARE

## 2017-04-11 PROCEDURE — 3331090001 HH PPS REVENUE CREDIT

## 2017-04-11 PROCEDURE — 3331090002 HH PPS REVENUE DEBIT

## 2017-04-12 ENCOUNTER — TELEPHONE (OUTPATIENT)
Dept: FAMILY MEDICINE CLINIC | Age: 77
End: 2017-04-12

## 2017-04-12 PROCEDURE — 3331090001 HH PPS REVENUE CREDIT

## 2017-04-12 PROCEDURE — 3331090002 HH PPS REVENUE DEBIT

## 2017-04-12 NOTE — TELEPHONE ENCOUNTER
Lucia Carlos,    Could you please give Bobbi Avelar a call back at [phone] in regards to [address] [place] [address]. Thank you.

## 2017-04-12 NOTE — TELEPHONE ENCOUNTER
Spoke with Lucas Joseph. Just wanted to let Dr Camilla Verdugo know that she will be putting in orders for a home health aid to come in with patient.

## 2017-04-13 ENCOUNTER — HOME CARE VISIT (OUTPATIENT)
Dept: HOME HEALTH SERVICES | Facility: HOME HEALTH | Age: 77
End: 2017-04-13
Payer: MEDICARE

## 2017-04-13 PROCEDURE — 3331090001 HH PPS REVENUE CREDIT

## 2017-04-13 PROCEDURE — 3331090002 HH PPS REVENUE DEBIT

## 2017-04-14 ENCOUNTER — HOME CARE VISIT (OUTPATIENT)
Dept: HOME HEALTH SERVICES | Facility: HOME HEALTH | Age: 77
End: 2017-04-14
Payer: MEDICARE

## 2017-04-14 PROCEDURE — 3331090001 HH PPS REVENUE CREDIT

## 2017-04-14 PROCEDURE — 3331090002 HH PPS REVENUE DEBIT

## 2017-04-15 PROCEDURE — 3331090002 HH PPS REVENUE DEBIT

## 2017-04-15 PROCEDURE — 3331090001 HH PPS REVENUE CREDIT

## 2017-04-16 PROCEDURE — 3331090002 HH PPS REVENUE DEBIT

## 2017-04-16 PROCEDURE — 3331090001 HH PPS REVENUE CREDIT

## 2017-04-17 ENCOUNTER — HOME CARE VISIT (OUTPATIENT)
Dept: HOME HEALTH SERVICES | Facility: HOME HEALTH | Age: 77
End: 2017-04-17
Payer: MEDICARE

## 2017-04-17 PROCEDURE — 3331090002 HH PPS REVENUE DEBIT

## 2017-04-17 PROCEDURE — 3331090001 HH PPS REVENUE CREDIT

## 2017-04-18 ENCOUNTER — TELEPHONE (OUTPATIENT)
Dept: FAMILY MEDICINE CLINIC | Age: 77
End: 2017-04-18

## 2017-04-18 ENCOUNTER — HOME CARE VISIT (OUTPATIENT)
Dept: SCHEDULING | Facility: HOME HEALTH | Age: 77
End: 2017-04-18
Payer: MEDICARE

## 2017-04-18 ENCOUNTER — HOME CARE VISIT (OUTPATIENT)
Dept: HOME HEALTH SERVICES | Facility: HOME HEALTH | Age: 77
End: 2017-04-18
Payer: MEDICARE

## 2017-04-18 PROCEDURE — 3331090002 HH PPS REVENUE DEBIT

## 2017-04-18 PROCEDURE — 3331090001 HH PPS REVENUE CREDIT

## 2017-04-18 PROCEDURE — G0299 HHS/HOSPICE OF RN EA 15 MIN: HCPCS

## 2017-04-18 NOTE — TELEPHONE ENCOUNTER
I have spoken with someone in at [facility]. More office have been faxed. I have advised patient caregiver to give us a call back if she does not hear anything from them in next week or so.

## 2017-04-18 NOTE — TELEPHONE ENCOUNTER
Pt daughter states a month ago we were doing a order for her to get a wheel chair and they have not heard a word from us or any company.  Please advise

## 2017-04-19 PROCEDURE — 3331090002 HH PPS REVENUE DEBIT

## 2017-04-19 PROCEDURE — 3331090001 HH PPS REVENUE CREDIT

## 2017-04-20 ENCOUNTER — HOME CARE VISIT (OUTPATIENT)
Dept: HOME HEALTH SERVICES | Facility: HOME HEALTH | Age: 77
End: 2017-04-20
Payer: MEDICARE

## 2017-04-20 ENCOUNTER — HOME CARE VISIT (OUTPATIENT)
Dept: SCHEDULING | Facility: HOME HEALTH | Age: 77
End: 2017-04-20
Payer: MEDICARE

## 2017-04-20 VITALS
SYSTOLIC BLOOD PRESSURE: 110 MMHG | DIASTOLIC BLOOD PRESSURE: 62 MMHG | TEMPERATURE: 98.2 F | OXYGEN SATURATION: 97 % | RESPIRATION RATE: 20 BRPM | HEART RATE: 60 BPM

## 2017-04-20 PROCEDURE — G0152 HHCP-SERV OF OT,EA 15 MIN: HCPCS

## 2017-04-20 PROCEDURE — 3331090001 HH PPS REVENUE CREDIT

## 2017-04-20 PROCEDURE — 3331090002 HH PPS REVENUE DEBIT

## 2017-04-21 PROCEDURE — 3331090002 HH PPS REVENUE DEBIT

## 2017-04-21 PROCEDURE — 3331090001 HH PPS REVENUE CREDIT

## 2017-04-22 PROCEDURE — 3331090001 HH PPS REVENUE CREDIT

## 2017-04-22 PROCEDURE — 3331090002 HH PPS REVENUE DEBIT

## 2017-04-23 PROCEDURE — 3331090002 HH PPS REVENUE DEBIT

## 2017-04-23 PROCEDURE — 3331090001 HH PPS REVENUE CREDIT

## 2017-04-24 ENCOUNTER — HOME CARE VISIT (OUTPATIENT)
Dept: SCHEDULING | Facility: HOME HEALTH | Age: 77
End: 2017-04-24
Payer: MEDICARE

## 2017-04-24 PROCEDURE — G0151 HHCP-SERV OF PT,EA 15 MIN: HCPCS

## 2017-04-24 PROCEDURE — 3331090001 HH PPS REVENUE CREDIT

## 2017-04-24 PROCEDURE — 3331090002 HH PPS REVENUE DEBIT

## 2017-04-25 PROCEDURE — 3331090002 HH PPS REVENUE DEBIT

## 2017-04-25 PROCEDURE — 3331090001 HH PPS REVENUE CREDIT

## 2017-04-26 ENCOUNTER — HOME CARE VISIT (OUTPATIENT)
Dept: SCHEDULING | Facility: HOME HEALTH | Age: 77
End: 2017-04-26
Payer: MEDICARE

## 2017-04-26 PROCEDURE — G0152 HHCP-SERV OF OT,EA 15 MIN: HCPCS

## 2017-04-26 PROCEDURE — 3331090002 HH PPS REVENUE DEBIT

## 2017-04-26 PROCEDURE — 3331090001 HH PPS REVENUE CREDIT

## 2017-04-27 VITALS — HEART RATE: 84 BPM | RESPIRATION RATE: 17 BRPM

## 2017-04-27 PROCEDURE — 3331090002 HH PPS REVENUE DEBIT

## 2017-04-27 PROCEDURE — 3331090001 HH PPS REVENUE CREDIT

## 2017-04-28 ENCOUNTER — HOME CARE VISIT (OUTPATIENT)
Dept: HOME HEALTH SERVICES | Facility: HOME HEALTH | Age: 77
End: 2017-04-28
Payer: MEDICARE

## 2017-04-28 PROCEDURE — 3331090001 HH PPS REVENUE CREDIT

## 2017-04-28 PROCEDURE — 3331090002 HH PPS REVENUE DEBIT

## 2017-04-29 PROCEDURE — 3331090002 HH PPS REVENUE DEBIT

## 2017-04-29 PROCEDURE — 3331090001 HH PPS REVENUE CREDIT

## 2017-04-30 PROCEDURE — 3331090001 HH PPS REVENUE CREDIT

## 2017-04-30 PROCEDURE — 3331090002 HH PPS REVENUE DEBIT

## 2017-05-01 PROCEDURE — 3331090001 HH PPS REVENUE CREDIT

## 2017-05-01 PROCEDURE — 3331090002 HH PPS REVENUE DEBIT

## 2017-05-02 ENCOUNTER — HOME CARE VISIT (OUTPATIENT)
Dept: HOME HEALTH SERVICES | Facility: HOME HEALTH | Age: 77
End: 2017-05-02
Payer: MEDICARE

## 2017-05-02 DIAGNOSIS — M05.79 RHEUMATOID ARTHRITIS INVOLVING MULTIPLE SITES WITH POSITIVE RHEUMATOID FACTOR (HCC): ICD-10-CM

## 2017-05-02 DIAGNOSIS — M47.817 SPONDYLOSIS OF LUMBOSACRAL REGION, UNSPECIFIED SPINAL OSTEOARTHRITIS COMPLICATION STATUS: ICD-10-CM

## 2017-05-02 DIAGNOSIS — M54.50 CHRONIC MIDLINE LOW BACK PAIN WITHOUT SCIATICA: ICD-10-CM

## 2017-05-02 DIAGNOSIS — G89.29 CHRONIC MIDLINE LOW BACK PAIN WITHOUT SCIATICA: ICD-10-CM

## 2017-05-02 PROCEDURE — 3331090001 HH PPS REVENUE CREDIT

## 2017-05-02 PROCEDURE — 3331090002 HH PPS REVENUE DEBIT

## 2017-05-02 NOTE — TELEPHONE ENCOUNTER
Patient would like refills on Hydrocodone and Tramadol. We need to call daughter when they are ready for .

## 2017-05-03 ENCOUNTER — HOME CARE VISIT (OUTPATIENT)
Dept: SCHEDULING | Facility: HOME HEALTH | Age: 77
End: 2017-05-03
Payer: MEDICARE

## 2017-05-03 ENCOUNTER — HOME CARE VISIT (OUTPATIENT)
Dept: HOME HEALTH SERVICES | Facility: HOME HEALTH | Age: 77
End: 2017-05-03
Payer: MEDICARE

## 2017-05-03 PROCEDURE — 3331090002 HH PPS REVENUE DEBIT

## 2017-05-03 PROCEDURE — 3331090001 HH PPS REVENUE CREDIT

## 2017-05-03 RX ORDER — TRAMADOL HYDROCHLORIDE 50 MG/1
50 TABLET ORAL
Qty: 90 TAB | Refills: 0 | Status: SHIPPED | OUTPATIENT
Start: 2017-05-03 | End: 2017-05-30 | Stop reason: SDUPTHER

## 2017-05-03 NOTE — TELEPHONE ENCOUNTER
Daughter Whitney Salcedo stopped by and asks why the Hydrocodone wasn't filled. Can we call to explain?

## 2017-05-04 ENCOUNTER — DOCUMENTATION ONLY (OUTPATIENT)
Dept: FAMILY MEDICINE CLINIC | Age: 77
End: 2017-05-04

## 2017-05-04 ENCOUNTER — HOME CARE VISIT (OUTPATIENT)
Dept: SCHEDULING | Facility: HOME HEALTH | Age: 77
End: 2017-05-04
Payer: MEDICARE

## 2017-05-04 DIAGNOSIS — M05.79 RHEUMATOID ARTHRITIS INVOLVING MULTIPLE SITES WITH POSITIVE RHEUMATOID FACTOR (HCC): ICD-10-CM

## 2017-05-04 DIAGNOSIS — M54.50 CHRONIC MIDLINE LOW BACK PAIN WITHOUT SCIATICA: ICD-10-CM

## 2017-05-04 DIAGNOSIS — G89.29 CHRONIC MIDLINE LOW BACK PAIN WITHOUT SCIATICA: ICD-10-CM

## 2017-05-04 PROCEDURE — G0151 HHCP-SERV OF PT,EA 15 MIN: HCPCS

## 2017-05-04 PROCEDURE — 3331090002 HH PPS REVENUE DEBIT

## 2017-05-04 PROCEDURE — 3331090001 HH PPS REVENUE CREDIT

## 2017-05-04 RX ORDER — HYDROCODONE BITARTRATE AND ACETAMINOPHEN 5; 325 MG/1; MG/1
1 TABLET ORAL
Qty: 90 TAB | Refills: 0 | Status: SHIPPED | OUTPATIENT
Start: 2017-05-04 | End: 2017-05-30 | Stop reason: ALTCHOICE

## 2017-05-04 RX ORDER — HYDROCODONE BITARTRATE AND ACETAMINOPHEN 7.5; 325 MG/1; MG/1
1 TABLET ORAL
Qty: 90 TAB | Refills: 0 | OUTPATIENT
Start: 2017-05-04

## 2017-05-04 NOTE — PROGRESS NOTES
Decreased dosage of norco to 5/325mg for age and safety. OV with labs indicated prior to next refill.    Kika Alexis NPMeirC

## 2017-05-05 PROCEDURE — 3331090002 HH PPS REVENUE DEBIT

## 2017-05-05 PROCEDURE — 3331090001 HH PPS REVENUE CREDIT

## 2017-05-06 PROCEDURE — 3331090002 HH PPS REVENUE DEBIT

## 2017-05-06 PROCEDURE — 3331090001 HH PPS REVENUE CREDIT

## 2017-05-07 VITALS — OXYGEN SATURATION: 97 % | RESPIRATION RATE: 16 BRPM | HEART RATE: 74 BPM

## 2017-05-07 PROCEDURE — 3331090001 HH PPS REVENUE CREDIT

## 2017-05-07 PROCEDURE — 3331090002 HH PPS REVENUE DEBIT

## 2017-05-08 PROCEDURE — 3331090001 HH PPS REVENUE CREDIT

## 2017-05-08 PROCEDURE — 3331090002 HH PPS REVENUE DEBIT

## 2017-05-09 PROCEDURE — 3331090002 HH PPS REVENUE DEBIT

## 2017-05-09 PROCEDURE — 3331090001 HH PPS REVENUE CREDIT

## 2017-05-10 ENCOUNTER — HOME CARE VISIT (OUTPATIENT)
Dept: SCHEDULING | Facility: HOME HEALTH | Age: 77
End: 2017-05-10
Payer: MEDICARE

## 2017-05-10 VITALS
HEART RATE: 80 BPM | TEMPERATURE: 98.2 F | SYSTOLIC BLOOD PRESSURE: 116 MMHG | OXYGEN SATURATION: 98 % | DIASTOLIC BLOOD PRESSURE: 74 MMHG

## 2017-05-10 PROCEDURE — 3331090002 HH PPS REVENUE DEBIT

## 2017-05-10 PROCEDURE — G0152 HHCP-SERV OF OT,EA 15 MIN: HCPCS

## 2017-05-10 PROCEDURE — 3331090001 HH PPS REVENUE CREDIT

## 2017-05-11 LAB
ALPRAZ UR CFM-MCNC: 427 NG/ML
ALPRAZ UR QL: POSITIVE
AMPHETAMINES UR QL SCN: NEGATIVE NG/ML
BARBITURATES UR QL SCN: NEGATIVE NG/ML
BENZODIAZ UR QL SCN: NORMAL NG/ML
BENZODIAZ UR QL: POSITIVE NG/ML
BUPRENORPHINE UR QL: NEGATIVE NG/ML
BZE UR QL SCN: NEGATIVE NG/ML
CANNABINOIDS UR QL SCN: NEGATIVE NG/ML
CLONAZEPAM UR QL: NEGATIVE
CREAT UR-MCNC: 175 MG/DL (ref 20–300)
FENTANYL+NORFENTANYL UR QL SCN: NEGATIVE PG/ML
FLURAZEPAM UR QL: NEGATIVE
LORAZEPAM UR QL: NEGATIVE
MEPERIDINE UR QL: NEGATIVE NG/ML
METHADONE UR QL SCN: NEGATIVE NG/ML
MIDAZOLAM UR QL CFM: NEGATIVE
NORDIAZEPAM UR QL: NEGATIVE
OPIATES UR QL SCN: NEGATIVE NG/ML
OXAZEPAM UR QL: NEGATIVE
OXYCODONE UR CFM-MCNC: 128 NG/ML
OXYCODONE UR QL CFM: POSITIVE
OXYCODONE+OXYMORPHONE UR QL SCN: NORMAL NG/ML
OXYCODONE+OXYMORPHONE UR QL SCN: POSITIVE
OXYMORPHONE UR CFM-MCNC: 946 NG/ML
OXYMORPHONE UR QL CFM: POSITIVE
PCP UR QL: NEGATIVE NG/ML
PH UR: 5.8 [PH] (ref 4.5–8.9)
PLEASE NOTE:, 733157: NORMAL
PROPOXYPH UR QL SCN: NEGATIVE NG/ML
SP GR UR: 1.01
SPECIMEN STATUS REPORT, ROLRST: NORMAL
TEMAZEPAM UR QL CFM: NEGATIVE
TRAMADOL UR CFM-MCNC: >3000 NG/ML
TRAMADOL UR QL SCN: NORMAL NG/ML
TRAMADOL UR-MCNC: POSITIVE UG/ML
TRIAZOLAM UR QL: NEGATIVE

## 2017-05-11 PROCEDURE — 3331090001 HH PPS REVENUE CREDIT

## 2017-05-11 PROCEDURE — 3331090002 HH PPS REVENUE DEBIT

## 2017-05-12 PROCEDURE — 3331090001 HH PPS REVENUE CREDIT

## 2017-05-12 PROCEDURE — 3331090002 HH PPS REVENUE DEBIT

## 2017-05-13 PROCEDURE — 3331090002 HH PPS REVENUE DEBIT

## 2017-05-13 PROCEDURE — 3331090001 HH PPS REVENUE CREDIT

## 2017-05-14 PROCEDURE — 3331090001 HH PPS REVENUE CREDIT

## 2017-05-14 PROCEDURE — 3331090002 HH PPS REVENUE DEBIT

## 2017-05-15 PROCEDURE — 3331090001 HH PPS REVENUE CREDIT

## 2017-05-15 PROCEDURE — 3331090002 HH PPS REVENUE DEBIT

## 2017-05-16 PROCEDURE — 3331090001 HH PPS REVENUE CREDIT

## 2017-05-16 PROCEDURE — 3331090002 HH PPS REVENUE DEBIT

## 2017-05-17 PROCEDURE — 3331090001 HH PPS REVENUE CREDIT

## 2017-05-17 PROCEDURE — 3331090002 HH PPS REVENUE DEBIT

## 2017-05-18 PROCEDURE — 3331090002 HH PPS REVENUE DEBIT

## 2017-05-18 PROCEDURE — 3331090001 HH PPS REVENUE CREDIT

## 2017-05-18 NOTE — PROGRESS NOTES
Appropriate urin drug test  reviewed. Plan to discuss weaning. [facility].   Overdue for PT and INR

## 2017-05-19 PROCEDURE — 3331090001 HH PPS REVENUE CREDIT

## 2017-05-19 PROCEDURE — 3331090002 HH PPS REVENUE DEBIT

## 2017-05-20 PROCEDURE — 3331090002 HH PPS REVENUE DEBIT

## 2017-05-20 PROCEDURE — 3331090001 HH PPS REVENUE CREDIT

## 2017-05-21 PROCEDURE — 3331090001 HH PPS REVENUE CREDIT

## 2017-05-21 PROCEDURE — 3331090002 HH PPS REVENUE DEBIT

## 2017-05-22 ENCOUNTER — OFFICE VISIT (OUTPATIENT)
Dept: FAMILY MEDICINE CLINIC | Age: 77
End: 2017-05-22

## 2017-05-22 VITALS
SYSTOLIC BLOOD PRESSURE: 134 MMHG | HEIGHT: 62 IN | HEART RATE: 66 BPM | WEIGHT: 168 LBS | RESPIRATION RATE: 16 BRPM | OXYGEN SATURATION: 99 % | BODY MASS INDEX: 30.91 KG/M2 | DIASTOLIC BLOOD PRESSURE: 74 MMHG | TEMPERATURE: 96 F

## 2017-05-22 DIAGNOSIS — E11.65 TYPE 2 DIABETES MELLITUS WITH HYPERGLYCEMIA, UNSPECIFIED LONG TERM INSULIN USE STATUS: ICD-10-CM

## 2017-05-22 DIAGNOSIS — E55.9 HYPOVITAMINOSIS D: ICD-10-CM

## 2017-05-22 DIAGNOSIS — I48.91 ATRIAL FIBRILLATION, UNSPECIFIED TYPE (HCC): Primary | ICD-10-CM

## 2017-05-22 DIAGNOSIS — I10 ESSENTIAL HYPERTENSION: ICD-10-CM

## 2017-05-22 LAB
INR BLD: 4.5
PT POC: 54.2 SECONDS
VALID INTERNAL CONTROL?: YES

## 2017-05-22 PROCEDURE — 3331090002 HH PPS REVENUE DEBIT

## 2017-05-22 PROCEDURE — 3331090001 HH PPS REVENUE CREDIT

## 2017-05-22 NOTE — PROGRESS NOTES
Hold Coumadin today and eat lots of greens, then take 2.5 mg tomorrow and cont current dose and recheck in 1w

## 2017-05-22 NOTE — PROGRESS NOTES
HISTORY OF PRESENT ILLNESS  Carter Aburto is a 76 y.o. female. Chief Complaint   Patient presents with    Follow-up     routine / BW       HPI  DM   at night  On Lantus 14 U every day  Did not increase to 20 U as ordered 2/16  Here for recheck    HTN  BP ok    Not on Chol meds    Afib  On Coumadin  Taking 1 tab every day and 1/2 on Tue and Thu    On pain meds for OA      Review of Systems   HENT: Negative for congestion. Respiratory: Negative for cough. Cardiovascular: Negative for chest pain. Gastrointestinal: Negative for blood in stool and melena. Genitourinary: Negative for hematuria. Endo/Heme/Allergies: Does not bruise/bleed easily. Past Medical History:   Diagnosis Date    Atrial fibrillation ([facility]     [facility] 12/2/2013    Cardiomegaly     CKD (chronic kidney disease)     COPD     Degenerative spondylolisthesis     Diabetes (HCC)     IDDM    GERD (gastroesophageal reflux disease)     Hyperlipidemia     Hypertension     Hypokalemia     Myalgia     Osteoarthritis of knee     Rheumatoid arthritis ([facility] 12/2/2013    Tachycardia     Venous insufficiency     Vitamin D deficiency      Current Outpatient Prescriptions   Medication Sig Dispense Refill    HYDROcodone-acetaminophen (NORCO) 5-325 mg per tablet Take 1 Tab by mouth every eight (8) hours as needed for Pain. Max Daily Amount: 3 Tabs. Indications: Pain 90 Tab 0    traMADol (ULTRAM) 50 mg tablet Take 1 Tab by mouth three (3) times daily as needed for Pain. Max Daily Amount: 150 mg. Indications: Pain 90 Tab 0    mometasone (ELOCON) 0.1 % topical cream APPLY TO AFFECTED AREA DAILY 15 g 0    ALPRAZolam (XANAX) 0.25 mg tablet Take 1 Tab by mouth two (2) times daily as needed for Anxiety. Max Daily Amount: 0.5 mg. Indications: ANXIETY WITH DEPRESSION 45 Tab 1    atenolol (TENORMIN) 50 mg tablet Take 1 Tab by mouth daily.  30 Tab 3    predniSONE (DELTASONE) 5 mg tablet Take up to 7 tabs daily as directed in written instructions given. 28 Tab 1    DULoxetine (CYMBALTA) 20 mg capsule Take 1 Cap by mouth daily. Indications: ANXIETY WITH DEPRESSION 30 Cap 3    furosemide (LASIX) 40 mg tablet Take 1 Tab by mouth daily. 90 Tab 1    warfarin (COUMADIN) 5 mg tablet 1/2 tablet on Tues and Thurs and 1 tablet all other days  Indications: PREVENT THROMBOEMBOLISM IN CHRONIC ATRIAL FIBRILLATION 30 Tab 3    traZODone (DESYREL) 50 mg tablet Take 1 Tab by mouth nightly. 30 Tab 2    amLODIPine (NORVASC) 10 mg tablet Take 1 Tab by mouth daily. 30 Tab 5    insulin glargine (LANTUS) 100 unit/mL injection INJECT 14 UNITS UNDER THE SKIN DAILY 10 mL 5    lisinopril (PRINIVIL, ZESTRIL) 20 mg tablet TAKE 1 TABLET BY MOUTH DAILY 30 Tab 12    Insulin Syringe-Needle U-100 0.3 mL 30 x 1/2\" syrg Use as directed 100 Syringe 3    BD INSULIN SYRINGE ULTRA-FINE 1/2 mL 30 x 1/2\" syrg INJECT INSULIN DAILY 100 Syringe 11    docusate sodium (STOOL SOFTENER) 100 mg capsule Take 100 mg by mouth two (2) times daily as needed for Constipation. Allergies   Allergen Reactions    Statins-Hmg-Coa Reductase Inhibitors Myalgia     Visit Vitals    /74 (BP 1 Location: Left arm, BP Patient Position: Sitting)    Pulse 66    Temp 96 °F (35.6 °C) (Oral)    Resp 16    Ht 5' 2\" (1.575 m)    Wt 168 lb (76.2 kg)    SpO2 99%    BMI 30.73 kg/m2       Physical Exam   Constitutional: She is oriented to person, place, and time. She appears well-developed and well-nourished. No distress. HENT:   Head: Normocephalic and atraumatic. Eyes: Conjunctivae and EOM are normal.   Cardiovascular: Normal rate and regular rhythm. Pulmonary/Chest: Effort normal and breath sounds normal.   Abdominal: Soft. There is no tenderness. Musculoskeletal: She exhibits no edema. Lymphadenopathy:     She has no cervical adenopathy. Neurological: She is alert and oriented to person, place, and time. Skin: Skin is warm and dry.    Psychiatric: She has a normal mood and affect. Nursing note and vitals reviewed. Recent Results (from the past 12 hour(s))   AMB POC PT/INR    Collection Time: 05/22/17 11:55 AM   Result Value Ref Range    VALID INTERNAL CONTROL POC Yes     Prothrombin time (POC) 54.2 seconds    INR POC 4.5        ASSESSMENT and PLAN    ICD-10-CM ICD-9-CM    1. Atrial fibrillation, unspecified type (HCC) I48.91 427.31 AMB POC PT/INR   2. Type 2 diabetes mellitus with hyperglycemia, unspecified long term insulin use status E11.65 250.00  DIABETES FOOT EXAM      MICROALBUMIN, UR, RAND W/ MICROALBUMIN/CREA RATIO      HEMOGLOBIN A1C W/O EAG      CANCELED: HEMOGLOBIN A1C W/O EAG   3.  Essential hypertension I10 401.9 CBC WITH AUTOMATED DIFF      LIPID PANEL WITH LDL/HDL RATIO      METABOLIC PANEL, COMPREHENSIVE      CANCELED: LIPID PANEL WITH LDL/HDL RATIO      CANCELED: METABOLIC PANEL, COMPREHENSIVE   hold Coumadin today and half a tablet tomorrow and eat salad and greens today, then cont current dose and recheck in 1 w

## 2017-05-22 NOTE — MR AVS SNAPSHOT
Visit Information Date & Time Provider Department Dept. Phone Encounter #  
 5/22/2017 11:30 AM Melvin Knutson MD [address] [place] [address] [phone] [id number] Upcoming Health Maintenance Date Due MICROALBUMIN Q1 1/25/1950 OSTEOPOROSIS SCREENING (DEXA) 1/25/2003 Pneumococcal 65+ Low/Medium Risk (1 of 2 - PCV13) 1/25/2003 FOOT EXAM Q1 10/19/2016 LIPID PANEL Q1 4/7/2017 EYE EXAM RETINAL OR DILATED Q1 5/19/2017 INFLUENZA AGE 9 TO ADULT 8/1/2017 HEMOGLOBIN A1C Q6M 8/10/2017 MEDICARE YEARLY EXAM 3/28/2018 GLAUCOMA SCREENING Q2Y 5/19/2018 DTaP/Tdap/Td series (2 - Td) 3/7/2027 Allergies as of 5/22/2017  Review Complete On: 5/22/2017 By: Grace Boyer Severity Noted Reaction Type Reactions Statins-hmg-coa Reductase Inhibitors  10/19/2015    Myalgia Current Immunizations  Never Reviewed No immunizations on file. Not reviewed this visit You Were Diagnosed With   
  
 Codes Comments [facility]    -  Primary ICD-10-CM: I48.91 
ICD-9-CM: 427.31 Type 2 diabetes mellitus with hyperglycemia, unspecified long term insulin use status     ICD-10-CM: E11.65 ICD-9-CM: 250.00 Essential hypertension     ICD-10-CM: I10 
ICD-9-CM: 401.9 Vitals BP Pulse Temp Resp Height(growth percentile) Weight(growth percentile) 134/74 (BP 1 Location: Left arm, BP Patient Position: Sitting) 66 96 °F (35.6 °C) (Oral) 16 5' 2\" (1.575 m) 168 lb (76.2 kg) SpO2 BMI OB Status Smoking Status 99% 30.73 kg/m2 Postmenopausal Never Smoker BMI and BSA Data Body Mass Index Body Surface Area 30.73 kg/m 2 1.83 m 2 Preferred Pharmacy Pharmacy Name Phone [address], [address] AT [facility] OF  [address] [phone] Your Updated Medication List  
  
   
 This list is accurate as of: 5/22/17 12:26 PM.  Always use your most recent med list.  
  
  
  
  
 ALPRAZolam 0.25 mg tablet Commonly known as:  Carlito Proud Take 1 Tab by mouth two (2) times daily as needed for Anxiety. Max Daily Amount: 0.5 mg. Indications: ANXIETY WITH DEPRESSION  
  
 amLODIPine 10 mg tablet Commonly known as:  Chica Raghavendra Take 1 Tab by mouth daily. atenolol 50 mg tablet Commonly known as:  TENORMIN Take 1 Tab by mouth daily. BD INSULIN SYRINGE ULTRA-FINE 1/2 mL 30 gauge x 1/2\" Syrg Generic drug:  Insulin Syringe-Needle U-100 INJECT INSULIN DAILY DULoxetine 20 mg capsule Commonly known as:  CYMBALTA Take 1 Cap by mouth daily. Indications: ANXIETY WITH DEPRESSION  
  
 furosemide 40 mg tablet Commonly known as:  LASIX Take 1 Tab by mouth daily. HYDROcodone-acetaminophen 5-325 mg per tablet Commonly known as:  Covington Steele Take 1 Tab by mouth every eight (8) hours as needed for Pain. Max Daily Amount: 3 Tabs. Indications: Pain  
  
 insulin glargine 100 unit/mL injection Commonly known as:  LANTUS INJECT 14 UNITS UNDER THE SKIN DAILY Insulin Syringe-Needle U-100 0.3 mL 30 gauge x 1/2\" Syrg Use as directed  
  
 lisinopril 20 mg tablet Commonly known as:  PRINIVIL, ZESTRIL  
TAKE 1 TABLET BY MOUTH DAILY  
  
 mometasone 0.1 % topical cream  
Commonly known as:  ELOCON  
APPLY TO AFFECTED AREA DAILY predniSONE 5 mg tablet Commonly known as:  Bob Hug Take up to 7 tabs daily as directed in written instructions given. STOOL SOFTENER 100 mg capsule Generic drug:  docusate sodium Take 100 mg by mouth two (2) times daily as needed for Constipation. traMADol 50 mg tablet Commonly known as:  ULTRAM  
Take 1 Tab by mouth three (3) times daily as needed for Pain. Max Daily Amount: 150 mg. Indications: Pain  
  
 traZODone 50 mg tablet Commonly known as:  Arneta Messenger Take 1 Tab by mouth nightly. warfarin 5 mg tablet Commonly known as:  COUMADIN  
1/2 tablet on Tues and Thurs and 1 tablet all other days  Indications: PREVENT THROMBOEMBOLISM IN CHRONIC ATRIAL FIBRILLATION We Performed the Following AMB POC PT/INR [96039 CPT(R)] CBC WITH AUTOMATED DIFF [28084 CPT(R)] HEMOGLOBIN A1C W/O EAG [82754 CPT(R)]  DIABETES FOOT EXAM [HM7 Custom] LIPID PANEL WITH LDL/HDL RATIO [89788 CPT(R)] METABOLIC PANEL, COMPREHENSIVE [77510 CPT(R)] To-Do List   
 05/22/2017 Lab:  MICROALBUMIN, UR, RAND W/ MICROALBUMIN/CREA RATIO Introducing \[facility]\"" & HEALTH SERVICES! Iraj Kemp introduces [employer] patient portal. Now you can access parts of your medical record, email your doctor's office, and request medication refills online. 1. In your internet browser, go to [email or web address] 2. Click on the First Time User? Click Here link in the Sign In box. You will see the New Member Sign Up page. 3. Enter your [employer] Access Code exactly as it appears below. You will not need to use this code after youve completed the sign-up process. If you do not sign up before the expiration date, you must request a new code. · [employer] Access Code: [email or web address] Expires: 6/25/2017  3:12 PM 
 
4. Enter the last four digits of your Social Security Number (xxxx) and Date of Birth (mm/dd/yyyy) as indicated and click Submit. You will be taken to the next sign-up page. 5. Create a [employer] ID. This will be your [employer] login ID and cannot be changed, so think of one that is secure and easy to remember. 6. Create a [employer] password. You can change your password at any time. 7. Enter your Password Reset Question and Answer. This can be used at a later time if you forget your password. 8. Enter your e-mail address. You will receive e-mail notification when new information is available in [address] 9. Click Sign Up. You can now view and download portions of your medical record. 10. Click the Download Summary menu link to download a portable copy of your medical information. If you have questions, please visit the Frequently Asked Questions section of the [employer] website. Remember, [employer] is NOT to be used for urgent needs. For medical emergencies, dial 911. Now available from your iPhone and Android! Please provide this summary of care documentation to your next provider. Your primary care clinician is listed as Oral Heimlich. If you have any questions after today's visit, please call [phone].

## 2017-05-23 LAB
25(OH)D3+25(OH)D2 SERPL-MCNC: 13.1 NG/ML (ref 30–100)
ALBUMIN SERPL-MCNC: 3.8 G/DL (ref 3.5–4.8)
ALBUMIN/GLOB SERPL: 1 {RATIO} (ref 1.2–2.2)
ALP SERPL-CCNC: 62 IU/L (ref 39–117)
ALT SERPL-CCNC: 3 IU/L (ref 0–32)
AST SERPL-CCNC: 12 IU/L (ref 0–40)
BASOPHILS # BLD AUTO: 0 X10E3/UL (ref 0–0.2)
BASOPHILS NFR BLD AUTO: 1 %
BILIRUB SERPL-MCNC: 0.2 MG/DL (ref 0–1.2)
BUN SERPL-MCNC: 11 MG/DL (ref 8–27)
BUN/CREAT SERPL: 10 (ref 12–28)
CALCIUM SERPL-MCNC: 9.3 MG/DL (ref 8.7–10.3)
CHLORIDE SERPL-SCNC: 101 MMOL/L (ref 96–106)
CHOLEST SERPL-MCNC: 244 MG/DL (ref 100–199)
CO2 SERPL-SCNC: 23 MMOL/L (ref 18–29)
CREAT SERPL-MCNC: 1.12 MG/DL (ref 0.57–1)
EOSINOPHIL # BLD AUTO: 0.1 X10E3/UL (ref 0–0.4)
EOSINOPHIL NFR BLD AUTO: 1 %
ERYTHROCYTE [DISTWIDTH] IN BLOOD BY AUTOMATED COUNT: 14.4 % (ref 12.3–15.4)
GLOBULIN SER CALC-MCNC: 3.8 G/DL (ref 1.5–4.5)
GLUCOSE SERPL-MCNC: 149 MG/DL (ref 65–99)
HBA1C MFR BLD: 8.4 % (ref 4.8–5.6)
HCT VFR BLD AUTO: 36.6 % (ref 34–46.6)
HDLC SERPL-MCNC: 45 MG/DL
HGB BLD-MCNC: 12.1 G/DL (ref 11.1–15.9)
IMM GRANULOCYTES # BLD: 0.1 X10E3/UL (ref 0–0.1)
IMM GRANULOCYTES NFR BLD: 1 %
INTERPRETATION: NORMAL
LDLC SERPL CALC-MCNC: 171 MG/DL (ref 0–99)
LDLC/HDLC SERPL: 3.8 RATIO UNITS (ref 0–3.2)
LYMPHOCYTES # BLD AUTO: 2.2 X10E3/UL (ref 0.7–3.1)
LYMPHOCYTES NFR BLD AUTO: 29 %
MCH RBC QN AUTO: 29.6 PG (ref 26.6–33)
MCHC RBC AUTO-ENTMCNC: 33.1 G/DL (ref 31.5–35.7)
MCV RBC AUTO: 90 FL (ref 79–97)
MONOCYTES # BLD AUTO: 0.5 X10E3/UL (ref 0.1–0.9)
MONOCYTES NFR BLD AUTO: 6 %
NEUTROPHILS # BLD AUTO: 4.8 X10E3/UL (ref 1.4–7)
NEUTROPHILS NFR BLD AUTO: 62 %
PLATELET # BLD AUTO: 380 X10E3/UL (ref 150–379)
POTASSIUM SERPL-SCNC: 5.3 MMOL/L (ref 3.5–5.2)
PROT SERPL-MCNC: 7.6 G/DL (ref 6–8.5)
RBC # BLD AUTO: 4.09 X10E6/UL (ref 3.77–5.28)
SODIUM SERPL-SCNC: 140 MMOL/L (ref 134–144)
TRIGL SERPL-MCNC: 140 MG/DL (ref 0–149)
VLDLC SERPL CALC-MCNC: 28 MG/DL (ref 5–40)
WBC # BLD AUTO: 7.8 X10E3/UL (ref 3.4–10.8)

## 2017-05-23 PROCEDURE — 3331090002 HH PPS REVENUE DEBIT

## 2017-05-23 PROCEDURE — 3331090001 HH PPS REVENUE CREDIT

## 2017-05-24 DIAGNOSIS — E78.2 MIXED HYPERLIPIDEMIA: ICD-10-CM

## 2017-05-24 DIAGNOSIS — E11.65 TYPE 2 DIABETES MELLITUS WITH HYPERGLYCEMIA, UNSPECIFIED LONG TERM INSULIN USE STATUS: Primary | ICD-10-CM

## 2017-05-24 DIAGNOSIS — E55.9 VITAMIN D DEFICIENCY: ICD-10-CM

## 2017-05-24 PROCEDURE — 3331090002 HH PPS REVENUE DEBIT

## 2017-05-24 PROCEDURE — 3331090001 HH PPS REVENUE CREDIT

## 2017-05-24 RX ORDER — ERGOCALCIFEROL 1.25 MG/1
50000 CAPSULE ORAL
Qty: 4 CAP | Refills: 3 | Status: SHIPPED | OUTPATIENT
Start: 2017-05-24 | End: 2018-12-28

## 2017-05-24 RX ORDER — EZETIMIBE 10 MG/1
10 TABLET ORAL DAILY
Qty: 30 TAB | Refills: 3 | Status: SHIPPED | OUTPATIENT
Start: 2017-05-24 | End: 2017-05-30 | Stop reason: SDUPTHER

## 2017-05-24 RX ORDER — INSULIN GLARGINE 100 [IU]/ML
INJECTION, SOLUTION SUBCUTANEOUS
Qty: 10 ML | Refills: 5
Start: 2017-05-24 | End: 2017-05-30

## 2017-05-24 NOTE — PROGRESS NOTES
Call pt, the CC is normal  The Chol is better but still too high, consider starting a Chol lowing med that is not a statin  I am calling in a med to take once a day  The kidney tests are a little better  The Potassium a touch up  The liver tests are normal  The HbA1C is 8.4, too high, increase Lantus to 16 Units and monitor your BS and RTC in 2 w with diary to further adjust the medication  The Vit D is very low, I am calling in a high dose Vit D and recheck in 3 mo

## 2017-05-25 PROCEDURE — 3331090002 HH PPS REVENUE DEBIT

## 2017-05-25 PROCEDURE — 3331090001 HH PPS REVENUE CREDIT

## 2017-05-30 ENCOUNTER — OFFICE VISIT (OUTPATIENT)
Dept: FAMILY MEDICINE CLINIC | Age: 77
End: 2017-05-30

## 2017-05-30 ENCOUNTER — TELEPHONE (OUTPATIENT)
Dept: FAMILY MEDICINE CLINIC | Age: 77
End: 2017-05-30

## 2017-05-30 VITALS
DIASTOLIC BLOOD PRESSURE: 79 MMHG | RESPIRATION RATE: 16 BRPM | TEMPERATURE: 97.7 F | HEART RATE: 67 BPM | WEIGHT: 161.8 LBS | SYSTOLIC BLOOD PRESSURE: 130 MMHG | HEIGHT: 62 IN | BODY MASS INDEX: 29.77 KG/M2 | OXYGEN SATURATION: 99 %

## 2017-05-30 DIAGNOSIS — E11.9 TYPE 2 DIABETES MELLITUS WITHOUT COMPLICATION, WITH LONG-TERM CURRENT USE OF INSULIN (HCC): ICD-10-CM

## 2017-05-30 DIAGNOSIS — I48.0 PAROXYSMAL ATRIAL FIBRILLATION (HCC): ICD-10-CM

## 2017-05-30 DIAGNOSIS — M47.817 SPONDYLOSIS OF LUMBOSACRAL REGION, UNSPECIFIED SPINAL OSTEOARTHRITIS COMPLICATION STATUS: ICD-10-CM

## 2017-05-30 DIAGNOSIS — I10 ESSENTIAL HYPERTENSION: ICD-10-CM

## 2017-05-30 DIAGNOSIS — Z79.01 ENCOUNTER FOR CURRENT LONG-TERM USE OF ANTICOAGULANTS: ICD-10-CM

## 2017-05-30 DIAGNOSIS — Z79.4 TYPE 2 DIABETES MELLITUS WITHOUT COMPLICATION, WITH LONG-TERM CURRENT USE OF INSULIN (HCC): ICD-10-CM

## 2017-05-30 DIAGNOSIS — I48.91 ATRIAL FIBRILLATION, UNSPECIFIED TYPE (HCC): Primary | ICD-10-CM

## 2017-05-30 DIAGNOSIS — E78.2 MIXED HYPERLIPIDEMIA: ICD-10-CM

## 2017-05-30 DIAGNOSIS — Z79.01 ANTICOAGULATED ON WARFARIN: ICD-10-CM

## 2017-05-30 DIAGNOSIS — F32.A DEPRESSION, UNSPECIFIED DEPRESSION TYPE: ICD-10-CM

## 2017-05-30 DIAGNOSIS — M05.79 RHEUMATOID ARTHRITIS INVOLVING MULTIPLE SITES WITH POSITIVE RHEUMATOID FACTOR (HCC): ICD-10-CM

## 2017-05-30 LAB
INR BLD: 3.4
PT POC: 40.8 SECONDS
VALID INTERNAL CONTROL?: YES

## 2017-05-30 RX ORDER — LISINOPRIL 20 MG/1
TABLET ORAL
Qty: 90 TAB | Refills: 3 | Status: SHIPPED | OUTPATIENT
Start: 2017-05-30 | End: 2019-01-01 | Stop reason: ALTCHOICE

## 2017-05-30 RX ORDER — VENLAFAXINE HYDROCHLORIDE 37.5 MG/1
37.5 CAPSULE, EXTENDED RELEASE ORAL DAILY
Qty: 30 CAP | Refills: 11 | Status: SHIPPED | OUTPATIENT
Start: 2017-05-30 | End: 2017-11-03 | Stop reason: SDUPTHER

## 2017-05-30 RX ORDER — ATENOLOL 50 MG/1
50 TABLET ORAL DAILY
Qty: 90 TAB | Refills: 3 | Status: SHIPPED | OUTPATIENT
Start: 2017-05-30 | End: 2017-07-11 | Stop reason: SDUPTHER

## 2017-05-30 RX ORDER — TRAMADOL HYDROCHLORIDE 50 MG/1
50 TABLET ORAL
Qty: 270 TAB | Refills: 0 | Status: SHIPPED | OUTPATIENT
Start: 2017-05-30 | End: 2017-07-14 | Stop reason: ALTCHOICE

## 2017-05-30 RX ORDER — EZETIMIBE 10 MG/1
10 TABLET ORAL DAILY
Qty: 90 TAB | Refills: 3 | Status: SHIPPED | OUTPATIENT
Start: 2017-05-30 | End: 2017-10-06 | Stop reason: ALTCHOICE

## 2017-05-30 RX ORDER — AMLODIPINE BESYLATE 10 MG/1
10 TABLET ORAL DAILY
Qty: 90 TAB | Refills: 3 | Status: SHIPPED | OUTPATIENT
Start: 2017-05-30 | End: 2018-08-02 | Stop reason: SDUPTHER

## 2017-05-30 RX ORDER — WARFARIN SODIUM 5 MG/1
TABLET ORAL
Qty: 90 TAB | Refills: 3 | Status: SHIPPED | OUTPATIENT
Start: 2017-05-30 | End: 2017-10-06 | Stop reason: SDUPTHER

## 2017-05-30 RX ORDER — FUROSEMIDE 40 MG/1
40 TABLET ORAL DAILY
Qty: 90 TAB | Refills: 3 | Status: SHIPPED | OUTPATIENT
Start: 2017-05-30 | End: 2018-12-28

## 2017-05-30 NOTE — PROGRESS NOTES
Demetrice Dong is a 76 y.o. female presenting for/with:    Coagulation disorder      HPI:    Follow up for anticoagulation. Indication: Atrial fibrillation  Current medication: Warfarin 5mg, 1/2 tablet on Tues and Thurs and 1 tablet all other days  Current symptoms: none  Current control agent: B-blocker  History of bleeding problems: none  Results for orders placed or performed in visit on 05/30/17   AMB POC PT/INR   Result Value Ref Range    VALID INTERNAL CONTROL POC Yes     Prothrombin time (POC) 40.8 seconds    INR POC 3.4      Lab Results   Component Value Date/Time    INR POC 4.5 05/22/2017 11:55 AM    INR POC 2.2 03/27/2017 01:25 PM     PMH, , Medications/Allergies: reviewed, on chart. There were no vitals taken for this visit. Wt Readings from Last 3 Encounters:   05/22/17 168 lb (76.2 kg)   04/04/17 168 lb (76.2 kg)   03/10/17 168 lb 3.2 oz (76.3 kg)       WDWN patient in no acute distress    A/P:  Afib, Good control. Med adjustment: change to Warfarin 5mg, 1/2 tablet on Sun, Tues and Thurs and 1 tablet all other days    HTN  well controlled. con't current tx. RF x90d    Fibromyalgia  On mult antidepressants in the same group, not actually taking them all. Will d/c trazodone, didn't help, not taking, change cymbalta to effexor due to cost, and d/c norco, has tramadol. Using xanax very rarely, has plenty, so will leave that on. RF tramadol. UDS reviewed, in goal. Med use agreement on chart. DM2  Due for check ed/cr. Cup given. Complains lantus too expensive at $35/bottle/mo. Try change to novolin N 70/30 6 units AM and 10 units PM.    RA and trunk weakness  Replacement manual w/c ordered. PT will use it to perform ADL's in the home like traveling to kitchen to cook, managing toileting. Can transfer and use in the home. F/U 1mo.

## 2017-05-30 NOTE — MR AVS SNAPSHOT
Visit Information Date & Time Provider Department Dept. Phone Encounter #  
 5/30/2017  3:00 PM Osorio Oliver, [address] [phone] [id number] Follow-up Instructions Return in about 4 weeks (around 6/27/2017). Follow-up and Disposition History Your Appointments 7/7/2017 11:30 AM  
ESTABLISHED PATIENT with NICOLA Melissa 3498 ([facility]) Appt Note: check up [address] [address]  
[address] [facility] [address] [address] Upcoming Health Maintenance Date Due MICROALBUMIN Q1 1/25/1950 OSTEOPOROSIS SCREENING (DEXA) 1/25/2003 Pneumococcal 65+ Low/Medium Risk (1 of 2 - PCV13) 1/25/2003 EYE EXAM RETINAL OR DILATED Q1 5/19/2017 INFLUENZA AGE 9 TO ADULT 8/1/2017 HEMOGLOBIN A1C Q6M 11/22/2017 MEDICARE YEARLY EXAM 3/28/2018 GLAUCOMA SCREENING Q2Y 5/19/2018 FOOT EXAM Q1 5/22/2018 LIPID PANEL Q1 5/22/2018 DTaP/Tdap/Td series (2 - Td) 3/7/2027 Allergies as of 5/30/2017  Review Complete On: 5/30/2017 By: Osorio Oliver MD  
  
 Severity Noted Reaction Type Reactions Statins-hmg-coa Reductase Inhibitors  10/19/2015    Myalgia Current Immunizations  Never Reviewed No immunizations on file. Not reviewed this visit You Were Diagnosed With   
  
 Codes Comments Atrial fibrillation, unspecified type ([facility]    -  Primary ICD-10-CM: I48.91 
ICD-9-CM: 427.31 Anticoagulated on warfarin     ICD-10-CM: Z79.01 
ICD-9-CM: V58.61 Encounter for current long-term use of anticoagulants     ICD-10-CM: Z79.01 
ICD-9-CM: V58.61 Paroxysmal atrial fibrillation (HCC)     ICD-10-CM: I48.0 ICD-9-CM: 427.31 Depression, unspecified depression type     ICD-10-CM: F32.9 ICD-9-CM: 950 Mixed hyperlipidemia     ICD-10-CM: E78.2 ICD-9-CM: 272.2 Essential hypertension     ICD-10-CM: I10 
ICD-9-CM: 401.9 Spondylosis of lumbosacral region, unspecified spinal osteoarthritis complication status     IAS-93-GL: M47.817 ICD-9-CM: 721.3 Rheumatoid arthritis involving multiple sites with positive rheumatoid factor (HCC)     ICD-10-CM: M05.79 ICD-9-CM: 714.0 Type 2 diabetes mellitus without complication, with long-term current use of insulin (HCC)     ICD-10-CM: E11.9, Z79.4 ICD-9-CM: 250.00, V58.67 Vitals OB Status Smoking Status Postmenopausal Never Smoker Preferred Pharmacy Pharmacy Name Phone [address], [address] AT [facility]  [address] [facility] Yelitza Porras [phone] Your Updated Medication List  
  
   
This list is accurate as of: 5/30/17  4:39 PM.  Always use your most recent med list.  
  
  
  
  
 ALPRAZolam 0.25 mg tablet Commonly known as:  Pete Fuse Take 1 Tab by mouth two (2) times daily as needed for Anxiety. Max Daily Amount: 0.5 mg. Indications: ANXIETY WITH DEPRESSION  
  
 amLODIPine 10 mg tablet Commonly known as:  Idalia Rafter Take 1 Tab by mouth daily. Indications: pressure  
  
 atenolol 50 mg tablet Commonly known as:  TENORMIN Take 1 Tab by mouth daily. Indications: pressure and heart BD INSULIN SYRINGE ULTRA-FINE 1/2 mL 30 gauge x 1/2\" Syrg Generic drug:  Insulin Syringe-Needle U-100 INJECT INSULIN DAILY  
  
 ergocalciferol 50,000 unit capsule Commonly known as:  VITAMIN D2 Take 1 Cap by mouth every seven (7) days. ezetimibe 10 mg tablet Commonly known as:  Wilmar Hefty Take 1 Tab by mouth daily. Indications: cholesterol and heart  
  
 furosemide 40 mg tablet Commonly known as:  LASIX Take 1 Tab by mouth daily. insulin NPH/insulin regular 100 unit/mL (70-30) injection Commonly known as:  NOVOLIN 70/30, HUMULIN 70/30  
6 units AM with breakfast and 10 units with dinner  Indications: sugar, replaces lantus Insulin Syringe-Needle U-100 0.3 mL 30 gauge x 1/2\" Syrg Use as directed  
  
 lisinopril 20 mg tablet Commonly known as:  PRINIVIL, ZESTRIL  
TAKE 1 TABLET BY MOUTH DAILY for pressure  
  
 mometasone 0.1 % topical cream  
Commonly known as:  ELOCON  
APPLY TO AFFECTED AREA DAILY predniSONE 5 mg tablet Commonly known as:  Izora Robin Take up to 7 tabs daily as directed in written instructions given. STOOL SOFTENER 100 mg capsule Generic drug:  docusate sodium Take 100 mg by mouth two (2) times daily as needed for Constipation. traMADol 50 mg tablet Commonly known as:  ULTRAM  
Take 1 Tab by mouth three (3) times daily as needed for Pain. Max Daily Amount: 150 mg. Indications: Pain  
  
 venlafaxine-SR 37.5 mg capsule Commonly known as:  EFFEXOR-XR Take 1 Cap by mouth daily. Indications: fibro, nerves, replaces cymbalta  
  
 warfarin 5 mg tablet Commonly known as:  COUMADIN  
1/2 tablet on Sun, Tues and Thurs and 1 tablet all other days  Indications: prevent stroke Prescriptions Printed Refills  
 traMADol (ULTRAM) 50 mg tablet 0 Sig: Take 1 Tab by mouth three (3) times daily as needed for Pain. Max Daily Amount: 150 mg. Indications: Pain Class: Print Route: Oral  
  
Prescriptions Sent to Pharmacy Refills  
 warfarin (COUMADIN) 5 mg tablet 3 Si/2 tablet on Sun, Tues and Thurs and 1 tablet all other days  Indications: prevent stroke Class: Normal  
 Pharmacy: [facility] [address]  Ph #: [phone]  
 amLODIPine (NORVASC) 10 mg tablet 3 Sig: Take 1 Tab by mouth daily. Indications: pressure Class: Normal  
 Pharmacy: [facility] [address]  Ph #: [phone]  Route: Oral  
 atenolol (TENORMIN) 50 mg tablet 3  
 Sig: Take 1 Tab by mouth daily. Indications: pressure and heart Class: Normal  
 Pharmacy: [address]  Ph #: [phone] Route: Oral  
 ezetimibe (ZETIA) 10 mg tablet 3 Sig: Take 1 Tab by mouth daily. Indications: cholesterol and heart Class: Normal  
 Pharmacy: [address]  Ph #: [phone] Route: Oral  
 furosemide (LASIX) 40 mg tablet 3 Sig: Take 1 Tab by mouth daily. Class: Normal  
 Pharmacy: [address]  Ph #: [phone] Route: Oral  
 lisinopril (PRINIVIL, ZESTRIL) 20 mg tablet 3 Sig: TAKE 1 TABLET BY MOUTH DAILY for pressure Class: Normal  
 Pharmacy: [address]  Ph #: [phone]  
 venlafaxine-SR (EFFEXOR-XR) 37.5 mg capsule 11 Sig: Take 1 Cap by mouth daily. Indications: fibro, nerves, replaces cymbalta Class: Normal  
 Pharmacy: [address]  Ph #: [phone] Route: Oral  
 insulin NPH/insulin regular (NOVOLIN 70/30, HUMULIN 70/30) 100 unit/mL (70-30) injection 11 Si units AM with breakfast and 10 units with dinner  Indications: sugar, replaces lantus Class: Normal  
 Pharmacy: [address]  Ph #: [phone] We Performed the Following AMB POC PT/INR [24192 CPT(R)] COLLECTION CAPILLARY BLOOD SPECIMEN [22425 CPT(R)] Follow-up Instructions Return in about 4 weeks (around 2017). To-Do List   
 2017 Lab:  MICROALBUMIN, UR, RAND W/ MICROALBUMIN/CREA RATIO Patient Instructions change to Warfarin 5mg, 1/2 tablet on Sun, Tues and Thurs and 1 tablet all other days. Introducing [facility] & HEALTH SERVICES! [facility] introduces [employer] patient portal. Now you can access parts of your medical record, email your doctor's office, and request medication refills online. 1. In your internet browser, go to [email or web address] 2. Click on the First Time User? Click Here link in the Sign In box. You will see the New Member Sign Up page. 3. Enter your Z[employer] Access Code exactly as it appears below. You will not need to use this code after youve completed the sign-up process. If you do not sign up before the expiration date, you must request a new code. · [employer] Access Code: [email or web address] Expires: 6/25/2017  3:12 PM 
 
4. Enter the last four digits of your Social Security Number (xxxx) and Date of Birth (mm/dd/yyyy) as indicated and click Submit. You will be taken to the next sign-up page. 5. Create a Zappos ID. This will be your Z[employer]os login ID and cannot be changed, so think of one that is secure and easy to remember. 6. Create a Z[employer]os password. You can change your password at any time. 7. Enter your Password Reset Question and Answer. This can be used at a later time if you forget your password. 8. Enter your e-mail address. You will receive e-mail notification when new information is available in [address] 9. Click Sign Up. You can now view and download portions of your medical record. 10. Click the Download Summary menu link to download a portable copy of your medical information. If you have questions, please visit the Frequently Asked Questions section of the [employer] website. Remember, [employer]os is NOT to be used for urgent needs. For medical emergencies, dial 911. Now available from your iPhone and Android! Please provide this summary of care documentation to your next provider. Your primary care clinician is listed as Sarah Patterson. If you have any questions after today's visit, please call [phone].

## 2017-05-31 RX ORDER — MOMETASONE FUROATE 1 MG/G
CREAM TOPICAL
Qty: 15 G | Refills: 0 | Status: SHIPPED | OUTPATIENT
Start: 2017-05-31 | End: 2017-08-09 | Stop reason: SDUPTHER

## 2017-06-16 ENCOUNTER — TELEPHONE (OUTPATIENT)
Dept: FAMILY MEDICINE CLINIC | Age: 77
End: 2017-06-16

## 2017-06-16 NOTE — TELEPHONE ENCOUNTER
Patient advised per Dr Esha Perez and Baron Chávez needs to wean off medication and need to take Tramadol for pain. Patient is asking that I send this message to Shira Laguna. While speaking with patient heard family member rasing voice in back ground.

## 2017-06-16 NOTE — TELEPHONE ENCOUNTER
Patient advised percocet was discontinued because she is already on Tramadol at visit with Dr Cardenas 2 weeks ago. Also advised by Chinmay Leavitt needed to wean off medication. Will send message to Dr Tomy Stern since Chinmay Leavitt is out of office until 06/19/17. Will see if he agrees to refill medication.

## 2017-06-16 NOTE — TELEPHONE ENCOUNTER
Patient would like to restart Percocet. She says she didn't understand why Dr. Tete Edwards stopped it.

## 2017-06-16 NOTE — TELEPHONE ENCOUNTER
Tried to call and advise patient. No answer and unable to leave a message due to a full voicemail box.

## 2017-06-21 RX ORDER — PREDNISONE 5 MG/1
TABLET ORAL
Qty: 28 TAB | Refills: 0 | Status: SHIPPED | OUTPATIENT
Start: 2017-06-21 | End: 2017-11-03 | Stop reason: ALTCHOICE

## 2017-06-26 ENCOUNTER — TELEPHONE (OUTPATIENT)
Dept: FAMILY MEDICINE CLINIC | Age: 77
End: 2017-06-26

## 2017-06-26 DIAGNOSIS — M62.81 WEAKNESS OF TRUNK MUSCULATURE: Primary | ICD-10-CM

## 2017-06-26 DIAGNOSIS — M05.79 RHEUMATOID ARTHRITIS INVOLVING MULTIPLE SITES WITH POSITIVE RHEUMATOID FACTOR (HCC): ICD-10-CM

## 2017-06-27 NOTE — TELEPHONE ENCOUNTER
Patient is already in wheel chair just needs a new one . Has back pain,weakness,fibromyalgia and sacroiliac joint disease.

## 2017-06-28 ENCOUNTER — TELEPHONE (OUTPATIENT)
Dept: FAMILY MEDICINE CLINIC | Age: 77
End: 2017-06-28

## 2017-06-28 NOTE — TELEPHONE ENCOUNTER
Jaye Dunn needs an addend to provider's progress note stating WHY patient needs a wheelchair. Fax back to [phone].

## 2017-07-11 ENCOUNTER — TELEPHONE (OUTPATIENT)
Dept: FAMILY MEDICINE CLINIC | Age: 77
End: 2017-07-11

## 2017-07-11 DIAGNOSIS — M79.7 FIBROMYALGIA: ICD-10-CM

## 2017-07-11 DIAGNOSIS — I10 ESSENTIAL HYPERTENSION: ICD-10-CM

## 2017-07-11 NOTE — TELEPHONE ENCOUNTER
Patient would like a refill on atenolol. She is moving her appointments to a new pharmacy. [place].

## 2017-07-11 NOTE — TELEPHONE ENCOUNTER
Orders and notes have been faxed to [facility] already. I will call Reginald Grace to check on progress of supply delivery.

## 2017-07-12 RX ORDER — ATENOLOL 50 MG/1
50 TABLET ORAL DAILY
Qty: 90 TAB | Refills: 3 | Status: SHIPPED | OUTPATIENT
Start: 2017-07-12 | End: 2018-09-12 | Stop reason: SDUPTHER

## 2017-07-12 RX ORDER — ALPRAZOLAM 0.25 MG/1
TABLET ORAL
Qty: 45 TAB | Refills: 0 | Status: SHIPPED | OUTPATIENT
Start: 2017-07-12 | End: 2017-09-18 | Stop reason: SDUPTHER

## 2017-07-12 NOTE — TELEPHONE ENCOUNTER
Patient called back. Advised she needs to call company back to schedule deliver date. She will have a family member call back to get office info since she was unable to take down information.

## 2017-07-12 NOTE — TELEPHONE ENCOUNTER
Patient's daughter forgot to tell us that Mrs. Jeaneth Zelaya does not live at her house and that is where the wheelchair was delivered and then taken back because there was no one to accept delivery. Mrs. Jeaneth Zelaya is living with Nate Coelho at [address] [place] [address]. Can we get it delivered again?

## 2017-07-12 NOTE — TELEPHONE ENCOUNTER
Spoke with Caitlin Crooks at [facility]. They went out to deliver patient's wheelchair on 07/07/17 no ones answered the door. Called to advise patient no answer and voicemail was full so could not leave a message.

## 2017-07-14 ENCOUNTER — OFFICE VISIT (OUTPATIENT)
Dept: FAMILY MEDICINE CLINIC | Age: 77
End: 2017-07-14

## 2017-07-14 VITALS
WEIGHT: 163 LBS | DIASTOLIC BLOOD PRESSURE: 68 MMHG | HEART RATE: 69 BPM | HEIGHT: 62 IN | RESPIRATION RATE: 16 BRPM | BODY MASS INDEX: 30 KG/M2 | OXYGEN SATURATION: 98 % | SYSTOLIC BLOOD PRESSURE: 130 MMHG | TEMPERATURE: 96.7 F

## 2017-07-14 DIAGNOSIS — F11.90 CHRONIC NARCOTIC USE: ICD-10-CM

## 2017-07-14 DIAGNOSIS — M53.3 SACROILIAC JOINT DISEASE: ICD-10-CM

## 2017-07-14 DIAGNOSIS — M47.817 SPONDYLOSIS OF LUMBOSACRAL REGION, UNSPECIFIED SPINAL OSTEOARTHRITIS COMPLICATION STATUS: ICD-10-CM

## 2017-07-14 DIAGNOSIS — M05.79 RHEUMATOID ARTHRITIS INVOLVING MULTIPLE SITES WITH POSITIVE RHEUMATOID FACTOR (HCC): Primary | ICD-10-CM

## 2017-07-14 RX ORDER — HYDROCODONE BITARTRATE AND ACETAMINOPHEN 5; 325 MG/1; MG/1
1 TABLET ORAL
Qty: 90 TAB | Refills: 0 | Status: SHIPPED | OUTPATIENT
Start: 2017-07-14 | End: 2017-09-01 | Stop reason: SDUPTHER

## 2017-07-14 RX ORDER — NALOXONE HYDROCHLORIDE 4 MG/.1ML
SPRAY NASAL
Qty: 1 PACKAGE | Refills: 0 | Status: SHIPPED | OUTPATIENT
Start: 2017-07-14 | End: 2018-09-14 | Stop reason: ALTCHOICE

## 2017-07-14 NOTE — PROGRESS NOTES
[address] [address] [facility] [place]   [phone]     7/18/2017  Chief Complaint   Patient presents with   Erica DHAL  Ms. Dion Carnes is a 76 y.o. female presents today with her daughter. She reports the tramadol is not effective in controlling her pain. She would like to use the Hydrocodone for pain control. Past Medical History:   Diagnosis Date    Atrial fibrillation ([facility] 12/2/2013    Cardiomegaly     CKD (chronic kidney disease)     COPD     Degenerative spondylolisthesis     Diabetes (HCC)     IDDM    GERD (gastroesophageal reflux disease)     Hyperlipidemia     Hypertension     Hypokalemia     Myalgia     Osteoarthritis of knee     Rheumatoid arthritis ([facility] 12/2/2013    Tachycardia     Venous insufficiency     Vitamin D deficiency        Allergies   Allergen Reactions    Statins-Hmg-Coa Reductase Inhibitors Myalgia       Current Outpatient Prescriptions on File Prior to Visit   Medication Sig Dispense Refill    atenolol (TENORMIN) 50 mg tablet Take 1 Tab by mouth daily. Indications: pressure and heart 90 Tab 3    ALPRAZolam (XANAX) 0.25 mg tablet TAKE 1 TABLET BY MOUTH TWICE DAILY AS NEEDED FOR ANXIETY 45 Tab 0    Wheel Chair chelle Man wheelchair. for mobility in the home. Repl prior wheelchair that is falling apart. 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 1 Each 0    mometasone (ELOCON) 0.1 % topical cream APPLY TO AFFECTED AREA DAILY 15 g 0    warfarin (COUMADIN) 5 mg tablet 1/2 tablet on Sun, Tues and Thurs and 1 tablet all other days  Indications: prevent stroke 90 Tab 3    amLODIPine (NORVASC) 10 mg tablet Take 1 Tab by mouth daily. Indications: pressure 90 Tab 3    furosemide (LASIX) 40 mg tablet Take 1 Tab by mouth daily.  90 Tab 3    lisinopril (PRINIVIL, ZESTRIL) 20 mg tablet TAKE 1 TABLET BY MOUTH DAILY for pressure 90 Tab 3    venlafaxine-SR (EFFEXOR-XR) 37.5 mg capsule Take 1 Cap by mouth daily. Indications: fibro, nerves, replaces cymbalta 30 Cap 11    insulin NPH/insulin regular (NOVOLIN 70/30, HUMULIN 70/30) 100 unit/mL (70-30) injection 6 units AM with breakfast and 10 units with dinner  Indications: sugar, replaces lantus 10 mL 11    Insulin Syringe-Needle U-100 0.3 mL 30 x 1/2\" syrg Use as directed 100 Syringe 3    BD INSULIN SYRINGE ULTRA-FINE 1/2 mL 30 x 1/2\" syrg INJECT INSULIN DAILY 100 Syringe 11    docusate sodium (STOOL SOFTENER) 100 mg capsule Take 100 mg by mouth two (2) times daily as needed for Constipation.  predniSONE (DELTASONE) 5 mg tablet TAKE UP TO 7 TABLETS BY MOUTH DAILY AS DIRECTED IN WRITTEN INSTRUCTIONS GIVEN 28 Tab 0    ezetimibe (ZETIA) 10 mg tablet Take 1 Tab by mouth daily. Indications: cholesterol and heart 90 Tab 3    ergocalciferol (VITAMIN D2) 50,000 unit capsule Take 1 Cap by mouth every seven (7) days. 4 Cap 3     No current facility-administered medications on file prior to visit. Lab Results   Component Value Date/Time    Hemoglobin A1c 8.4 05/22/2017 12:26 PM    Hemoglobin A1c 8.1 02/10/2017 04:19 PM    Hemoglobin A1c 8.0 12/02/2016 12:06 PM    Glucose 149 05/22/2017 12:26 PM    Glucose  07/28/2014 03:30 PM    LDL, calculated 171 05/22/2017 12:26 PM    Creatinine 1.12 05/22/2017 12:26 PM       Lab Results   Component Value Date/Time    Sodium 140 05/22/2017 12:26 PM    Potassium 5.3 05/22/2017 12:26 PM    Chloride 101 05/22/2017 12:26 PM    CO2 23 05/22/2017 12:26 PM    Glucose 149 05/22/2017 12:26 PM    BUN 11 05/22/2017 12:26 PM    Creatinine 1.12 05/22/2017 12:26 PM    BUN/Creatinine ratio 10 05/22/2017 12:26 PM    GFR est AA 54 05/22/2017 12:26 PM    GFR est non-AA 47 05/22/2017 12:26 PM    Calcium 9.3 05/22/2017 12:26 PM    Bilirubin, total 0.2 05/22/2017 12:26 PM    AST (SGOT) 12 05/22/2017 12:26 PM    Alk.  phosphatase 62 05/22/2017 12:26 PM    Protein, total 7.6 05/22/2017 12:26 PM    Albumin 3.8 05/22/2017 12:26 PM    A-G Ratio 1.0 05/22/2017 12:26 PM    ALT (SGPT) 3 05/22/2017 12:26 PM       Visit Vitals    /68    Pulse 69    Temp 96.7 °F (35.9 °C) (Oral)    Resp 16    Ht 5' 2\" (1.575 m)    Wt 163 lb (73.9 kg)    SpO2 98%    BMI 29.81 kg/m2     Physical Exam   Vitals reviewed. ICD-10-CM ICD-9-CM    1. Rheumatoid arthritis involving multiple sites with positive rheumatoid factor (HCC) M05.79 714.0 HYDROcodone-acetaminophen (NORCO) 5-325 mg per tablet   2. Spondylosis of lumbosacral region, unspecified spinal osteoarthritis complication status Y29.175 721.3 HYDROcodone-acetaminophen (NORCO) 5-325 mg per tablet   3. Sacroiliac joint disease M53.3 724.6 HYDROcodone-acetaminophen (NORCO) 5-325 mg per tablet   4. Chronic narcotic use F11.90 305.50 HYDROcodone-acetaminophen (NORCO) 5-325 mg per tablet      naloxone 4 mg/actuation spry     Reviewed previous lab work and diagnosis reviewed. Patient with chronic joint disease/ arthritis  Discussed discontinuing Tramadol, they are at the end the current prescription. Follow-up Disposition:  Return in about 3 months (around 10/14/2017).       Iesha Dolan PA-C, MHP

## 2017-07-14 NOTE — MR AVS SNAPSHOT
Visit Information Date & Time Provider Department Dept. Phone Encounter #  
 7/14/2017  3:15 PM Debbie Rose, [address] [phone] [id number] Upcoming Health Maintenance Date Due MICROALBUMIN Q1 1/25/1950 OSTEOPOROSIS SCREENING (DEXA) 1/25/2003 Pneumococcal 65+ Low/Medium Risk (1 of 2 - PCV13) 1/25/2003 EYE EXAM RETINAL OR DILATED Q1 5/19/2017 INFLUENZA AGE 9 TO ADULT 8/1/2017 HEMOGLOBIN A1C Q6M 11/22/2017 MEDICARE YEARLY EXAM 3/28/2018 GLAUCOMA SCREENING Q2Y 5/19/2018 FOOT EXAM Q1 5/22/2018 LIPID PANEL Q1 5/22/2018 DTaP/Tdap/Td series (2 - Td) 3/7/2027 Allergies as of 7/14/2017  Review Complete On: 7/14/2017 By: NICOLA Andrade Severity Noted Reaction Type Reactions Statins-hmg-coa Reductase Inhibitors  10/19/2015    Myalgia Current Immunizations  Never Reviewed No immunizations on file. Not reviewed this visit You Were Diagnosed With   
  
 Codes Comments Rheumatoid arthritis involving multiple sites with positive rheumatoid factor (HCC)    -  Primary ICD-10-CM: M05.79 ICD-9-CM: 714.0 Spondylosis of lumbosacral region, unspecified spinal osteoarthritis complication status     G-55-AP: M47.817 ICD-9-CM: 721.3 Sacroiliac joint disease     ICD-10-CM: M53.3 ICD-9-CM: 724.6 Chronic narcotic use     ICD-10-CM: F11.90 ICD-9-CM: 305.50 Vitals BP Pulse Temp Resp Height(growth percentile) Weight(growth percentile) 130/68 69 96.7 °F (35.9 °C) (Oral) 16 5' 2\" (1.575 m) 163 lb (73.9 kg) SpO2 BMI OB Status Smoking Status 98% 29.81 kg/m2 Postmenopausal Never Smoker Vitals History BMI and BSA Data Body Mass Index Body Surface Area  
 29.81 kg/m 2 1.8 m 2 Preferred Pharmacy Pharmacy Name Phone [address], [facility] Shiv Altamirano Saver [phone] Your Updated Medication List  
  
   
 This list is accurate as of: 7/14/17  4:09 PM.  Always use your most recent med list.  
  
  
  
  
 ALPRAZolam 0.25 mg tablet Commonly known as:  XANAX  
TAKE 1 TABLET BY MOUTH TWICE DAILY AS NEEDED FOR ANXIETY  
  
 amLODIPine 10 mg tablet Commonly known as:  Sol Grebe Take 1 Tab by mouth daily. Indications: pressure  
  
 atenolol 50 mg tablet Commonly known as:  TENORMIN Take 1 Tab by mouth daily. Indications: pressure and heart BD INSULIN SYRINGE ULTRA-FINE 1/2 mL 30 gauge x 1/2\" Syrg Generic drug:  Insulin Syringe-Needle U-100 INJECT INSULIN DAILY  
  
 ergocalciferol 50,000 unit capsule Commonly known as:  VITAMIN D2 Take 1 Cap by mouth every seven (7) days. ezetimibe 10 mg tablet Commonly known as:  Arleta Me Take 1 Tab by mouth daily. Indications: cholesterol and heart  
  
 furosemide 40 mg tablet Commonly known as:  LASIX Take 1 Tab by mouth daily. HYDROcodone-acetaminophen 5-325 mg per tablet Commonly known as:  Afia Tyrell Take 1 Tab by mouth two (2) times daily as needed for Pain. Max Daily Amount: 2 Tabs. Indications: Pain  
  
 insulin NPH/insulin regular 100 unit/mL (70-30) injection Commonly known as:  NOVOLIN 70/30, HUMULIN 70/30  
6 units AM with breakfast and 10 units with dinner  Indications: sugar, replaces lantus Insulin Syringe-Needle U-100 0.3 mL 30 gauge x 1/2\" Syrg Use as directed  
  
 lisinopril 20 mg tablet Commonly known as:  PRINIVIL, ZESTRIL  
TAKE 1 TABLET BY MOUTH DAILY for pressure  
  
 mometasone 0.1 % topical cream  
Commonly known as:  ELOCON  
APPLY TO AFFECTED AREA DAILY  
  
 naloxone 4 mg/actuation Spry Use 1 spray intranasally into 1 nostril. Use a new Narcan nasal spray for subsequent doses and administer into alternating nostrils. May repeat every 2 to 3 minutes as needed. Indications: OPIATE-INDUCED RESPIRATORY DEPRESSION  
  
 predniSONE 5 mg tablet Commonly known as:  Jose Carlos Fast  
 TAKE UP TO 7 TABLETS BY MOUTH DAILY AS DIRECTED IN WRITTEN INSTRUCTIONS GIVEN  
  
 STOOL SOFTENER 100 mg capsule Generic drug:  docusate sodium Take 100 mg by mouth two (2) times daily as needed for Constipation. venlafaxine-SR 37.5 mg capsule Commonly known as:  EFFEXOR-XR Take 1 Cap by mouth daily. Indications: fibro, nerves, replaces cymbalta  
  
 warfarin 5 mg tablet Commonly known as:  COUMADIN  
1/2 tablet on Sun, Tues and Thurs and 1 tablet all other days  Indications: prevent stroke [facility] wheelchair. for mobility in the home. Repl prior wheelchair that is falling apart. 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 Prescriptions Printed Refills HYDROcodone-acetaminophen (NORCO) 5-325 mg per tablet 0 Sig: Take 1 Tab by mouth two (2) times daily as needed for Pain. Max Daily Amount: 2 Tabs. Indications: Pain Class: Print Route: Oral  
 naloxone 4 mg/actuation spry 0 Sig: Use 1 spray intranasally into 1 nostril. Use a new Narcan nasal spray for subsequent doses and administer into alternating nostrils. May repeat every 2 to 3 minutes as needed. Indications: OPIATE-INDUCED RESPIRATORY DEPRESSION Class: Print Introducing [facility] & HEALTH SERVICES! Anthony Garvin introduces [employer] Mobile patient portal. Now you can access parts of your medical record, email your doctor's office, and request medication refills online. 1. In your internet browser, go to [email or web address] 2. Click on the First Time User? Click Here link in the Sign In box. You will see the New Member Sign Up page. 3. Enter your [employer] Mobile Access Code exactly as it appears below. You will not need to use this code after youve completed the sign-up process. If you do not sign up before the expiration date, you must request a new code. · [employer] Access Code: [email or web address] Expires: 10/12/2017  4:09 PM 
 
 4. Enter the last four digits of your Social Security Number (xxxx) and Date of Birth (mm/dd/yyyy) as indicated and click Submit. You will be taken to the next sign-up page. 5. Create a [employer] ID. This will be your [employer] login ID and cannot be changed, so think of one that is secure and easy to remember. 6. Create a [employer] password. You can change your password at any time. 7. Enter your Password Reset Question and Answer. This can be used at a later time if you forget your password. 8. Enter your e-mail address. You will receive e-mail notification when new information is available in [address] 9. Click Sign Up. You can now view and download portions of your medical record. 10. Click the Download Summary menu link to download a portable copy of your medical information. If you have questions, please visit the Frequently Asked Questions section of the [employer] website. Remember, [employer] is NOT to be used for urgent needs. For medical emergencies, dial 911. Now available from your iPhone and Android! Please provide this summary of care documentation to your next provider. Your primary care clinician is listed as Nay Anderson. If you have any questions after today's visit, please call [phone].

## 2017-07-31 ENCOUNTER — TELEPHONE (OUTPATIENT)
Dept: FAMILY MEDICINE CLINIC | Age: 77
End: 2017-07-31

## 2017-08-01 NOTE — TELEPHONE ENCOUNTER
Spoke with patient . She has not gotten chair. I have called [facility]. They will call patient to reschedule delivery.

## 2017-08-09 RX ORDER — MOMETASONE FUROATE 1 MG/G
CREAM TOPICAL
Qty: 15 G | Refills: 2 | Status: SHIPPED | OUTPATIENT
Start: 2017-08-09 | End: 2017-11-03 | Stop reason: SDUPTHER

## 2017-08-14 ENCOUNTER — DOCUMENTATION ONLY (OUTPATIENT)
Dept: FAMILY MEDICINE CLINIC | Age: 77
End: 2017-08-14

## 2017-08-14 DIAGNOSIS — M05.79 RHEUMATOID ARTHRITIS INVOLVING MULTIPLE SITES WITH POSITIVE RHEUMATOID FACTOR (HCC): ICD-10-CM

## 2017-08-14 DIAGNOSIS — M53.3 SACROILIAC JOINT DISEASE: ICD-10-CM

## 2017-08-14 DIAGNOSIS — F11.90 CHRONIC NARCOTIC USE: ICD-10-CM

## 2017-08-14 DIAGNOSIS — M47.817 SPONDYLOSIS OF LUMBOSACRAL REGION, UNSPECIFIED SPINAL OSTEOARTHRITIS COMPLICATION STATUS: ICD-10-CM

## 2017-08-14 RX ORDER — HYDROCODONE BITARTRATE AND ACETAMINOPHEN 5; 325 MG/1; MG/1
1 TABLET ORAL
Qty: 90 TAB | Refills: 0 | OUTPATIENT
Start: 2017-08-14

## 2017-08-14 NOTE — TELEPHONE ENCOUNTER
Patient is requesting a refill on Hydrocodone. She has one tab left. Daughter forgot to call on Friday.

## 2017-09-01 ENCOUNTER — DOCUMENTATION ONLY (OUTPATIENT)
Dept: FAMILY MEDICINE CLINIC | Age: 77
End: 2017-09-01

## 2017-09-01 DIAGNOSIS — M47.817 SPONDYLOSIS OF LUMBOSACRAL REGION, UNSPECIFIED SPINAL OSTEOARTHRITIS COMPLICATION STATUS: ICD-10-CM

## 2017-09-01 DIAGNOSIS — M53.3 SACROILIAC JOINT DISEASE: ICD-10-CM

## 2017-09-01 DIAGNOSIS — M05.79 RHEUMATOID ARTHRITIS INVOLVING MULTIPLE SITES WITH POSITIVE RHEUMATOID FACTOR (HCC): ICD-10-CM

## 2017-09-01 DIAGNOSIS — F11.90 CHRONIC NARCOTIC USE: ICD-10-CM

## 2017-09-01 RX ORDER — HYDROCODONE BITARTRATE AND ACETAMINOPHEN 5; 325 MG/1; MG/1
1 TABLET ORAL
Qty: 75 TAB | Refills: 0 | Status: SHIPPED | OUTPATIENT
Start: 2017-09-01 | End: 2017-10-06 | Stop reason: SDUPTHER

## 2017-09-01 NOTE — PROGRESS NOTES
Weaning patient from TID dosing of hydrocodone to BID   Last prescription 7/14/2017 for #90   7 weeks request for refill    Will wean to #75 pills

## 2017-09-01 NOTE — TELEPHONE ENCOUNTER
----- Message from Chato Case sent at 9/1/2017  9:16 AM EDT -----  Regarding: MADELIN James/Refill  Pt called concerning the medication R hydrocortisone and she wanted to see if she could  the prescription today.  Pt best contact number [phone]

## 2017-09-01 NOTE — TELEPHONE ENCOUNTER
Patient is not currently on hydrocortisone. Since they are asking to  Rx refill done for Hydrocodone.

## 2017-09-18 DIAGNOSIS — M79.7 FIBROMYALGIA: ICD-10-CM

## 2017-09-18 RX ORDER — ALPRAZOLAM 0.25 MG/1
TABLET ORAL
Qty: 45 TAB | Refills: 0 | Status: SHIPPED | OUTPATIENT
Start: 2017-09-18 | End: 2017-10-27 | Stop reason: SDUPTHER

## 2017-09-18 NOTE — TELEPHONE ENCOUNTER
Needs refill of Aprazolam .  Switching to [employer].   Patient has an appointment to be seen on 10/6/2017

## 2017-10-06 ENCOUNTER — OFFICE VISIT (OUTPATIENT)
Dept: FAMILY MEDICINE CLINIC | Age: 77
End: 2017-10-06

## 2017-10-06 VITALS
HEIGHT: 62 IN | SYSTOLIC BLOOD PRESSURE: 122 MMHG | DIASTOLIC BLOOD PRESSURE: 70 MMHG | HEART RATE: 70 BPM | TEMPERATURE: 97 F | OXYGEN SATURATION: 97 %

## 2017-10-06 DIAGNOSIS — M53.3 SACROILIAC JOINT DISEASE: ICD-10-CM

## 2017-10-06 DIAGNOSIS — I48.0 PAROXYSMAL ATRIAL FIBRILLATION (HCC): ICD-10-CM

## 2017-10-06 DIAGNOSIS — I48.91 ATRIAL FIBRILLATION, UNSPECIFIED TYPE (HCC): Primary | ICD-10-CM

## 2017-10-06 DIAGNOSIS — M05.79 RHEUMATOID ARTHRITIS INVOLVING MULTIPLE SITES WITH POSITIVE RHEUMATOID FACTOR (HCC): ICD-10-CM

## 2017-10-06 DIAGNOSIS — Z79.01 ENCOUNTER FOR CURRENT LONG-TERM USE OF ANTICOAGULANTS: ICD-10-CM

## 2017-10-06 DIAGNOSIS — E78.2 MIXED HYPERLIPIDEMIA: ICD-10-CM

## 2017-10-06 DIAGNOSIS — M47.817 SPONDYLOSIS OF LUMBOSACRAL REGION, UNSPECIFIED SPINAL OSTEOARTHRITIS COMPLICATION STATUS: ICD-10-CM

## 2017-10-06 DIAGNOSIS — Z79.01 ANTICOAGULATED ON WARFARIN: ICD-10-CM

## 2017-10-06 DIAGNOSIS — F11.90 CHRONIC NARCOTIC USE: ICD-10-CM

## 2017-10-06 LAB
INR BLD: 3
PT POC: 35.9 SECONDS
VALID INTERNAL CONTROL?: YES

## 2017-10-06 RX ORDER — WARFARIN SODIUM 5 MG/1
TABLET ORAL
Qty: 90 TAB | Refills: 3
Start: 2017-10-06 | End: 2018-08-02 | Stop reason: SDUPTHER

## 2017-10-06 RX ORDER — HYDROCODONE BITARTRATE AND ACETAMINOPHEN 7.5; 325 MG/1; MG/1
1 TABLET ORAL
Qty: 60 TAB | Refills: 0 | Status: SHIPPED | OUTPATIENT
Start: 2017-10-06 | End: 2017-11-03 | Stop reason: SDUPTHER

## 2017-10-06 RX ORDER — ATORVASTATIN CALCIUM 20 MG/1
20 TABLET, FILM COATED ORAL DAILY
Qty: 30 TAB | Refills: 6 | Status: SHIPPED | OUTPATIENT
Start: 2017-10-06 | End: 2018-09-17 | Stop reason: SDUPTHER

## 2017-10-06 RX ORDER — HYDROCODONE BITARTRATE AND ACETAMINOPHEN 5; 325 MG/1; MG/1
1 TABLET ORAL
Qty: 75 TAB | Refills: 0 | Status: SHIPPED | OUTPATIENT
Start: 2017-10-06 | End: 2017-10-06 | Stop reason: DRUGHIGH

## 2017-10-27 DIAGNOSIS — M79.7 FIBROMYALGIA: ICD-10-CM

## 2017-10-31 ENCOUNTER — TELEPHONE (OUTPATIENT)
Dept: FAMILY MEDICINE CLINIC | Age: 77
End: 2017-10-31

## 2017-10-31 NOTE — TELEPHONE ENCOUNTER
----- Message from Leah Dickey sent at 10/27/2017 11:16 AM EDT -----  Regarding: NICOLA James/telephone  Pt p) [phone], will have to r/s her appt for today,and would like to know if she can be scheduled in on Nov 3rd with Dr Neda Moreira only.

## 2017-11-01 RX ORDER — ALPRAZOLAM 0.25 MG/1
TABLET ORAL
Qty: 45 TAB | Refills: 1 | Status: SHIPPED | OUTPATIENT
Start: 2017-11-01 | End: 2017-11-03 | Stop reason: SDUPTHER

## 2017-11-03 ENCOUNTER — OFFICE VISIT (OUTPATIENT)
Dept: FAMILY MEDICINE CLINIC | Age: 77
End: 2017-11-03

## 2017-11-03 VITALS
DIASTOLIC BLOOD PRESSURE: 72 MMHG | WEIGHT: 172 LBS | RESPIRATION RATE: 18 BRPM | OXYGEN SATURATION: 97 % | BODY MASS INDEX: 31.65 KG/M2 | HEIGHT: 62 IN | SYSTOLIC BLOOD PRESSURE: 134 MMHG | HEART RATE: 72 BPM | TEMPERATURE: 96.8 F

## 2017-11-03 DIAGNOSIS — M79.7 FIBROMYALGIA: ICD-10-CM

## 2017-11-03 DIAGNOSIS — M53.3 SACROILIAC JOINT DISEASE: ICD-10-CM

## 2017-11-03 DIAGNOSIS — M47.817 SPONDYLOSIS OF LUMBOSACRAL REGION, UNSPECIFIED SPINAL OSTEOARTHRITIS COMPLICATION STATUS: ICD-10-CM

## 2017-11-03 DIAGNOSIS — F11.90 CHRONIC NARCOTIC USE: ICD-10-CM

## 2017-11-03 DIAGNOSIS — F32.A DEPRESSION, UNSPECIFIED DEPRESSION TYPE: ICD-10-CM

## 2017-11-03 DIAGNOSIS — M05.79 RHEUMATOID ARTHRITIS INVOLVING MULTIPLE SITES WITH POSITIVE RHEUMATOID FACTOR (HCC): ICD-10-CM

## 2017-11-03 DIAGNOSIS — I48.91 ATRIAL FIBRILLATION, UNSPECIFIED TYPE (HCC): Primary | ICD-10-CM

## 2017-11-03 DIAGNOSIS — K21.9 GASTROESOPHAGEAL REFLUX DISEASE, ESOPHAGITIS PRESENCE NOT SPECIFIED: ICD-10-CM

## 2017-11-03 LAB
INR BLD: 1.8
INR, EXTERNAL: 1.8
PT POC: 22.1 SECONDS
VALID INTERNAL CONTROL?: YES

## 2017-11-03 RX ORDER — HYDROCODONE BITARTRATE AND ACETAMINOPHEN 7.5; 325 MG/1; MG/1
1 TABLET ORAL
Qty: 60 TAB | Refills: 0 | Status: SHIPPED | OUTPATIENT
Start: 2017-11-03 | End: 2017-12-20

## 2017-11-03 RX ORDER — VENLAFAXINE HYDROCHLORIDE 37.5 MG/1
37.5 CAPSULE, EXTENDED RELEASE ORAL DAILY
Qty: 30 CAP | Refills: 11 | Status: SHIPPED | OUTPATIENT
Start: 2017-11-03 | End: 2018-09-17 | Stop reason: SDUPTHER

## 2017-11-03 RX ORDER — RANITIDINE 300 MG/1
300 TABLET ORAL DAILY
Qty: 30 TAB | Refills: 6 | Status: SHIPPED | OUTPATIENT
Start: 2017-11-03

## 2017-11-03 RX ORDER — ALPRAZOLAM 0.25 MG/1
TABLET ORAL
Qty: 45 TAB | Refills: 1 | Status: SHIPPED | OUTPATIENT
Start: 2017-11-03 | End: 2018-04-03 | Stop reason: ALTCHOICE

## 2017-11-03 RX ORDER — MOMETASONE FUROATE 1 MG/G
CREAM TOPICAL
Qty: 15 G | Refills: 2 | Status: SHIPPED | OUTPATIENT
Start: 2017-11-03 | End: 2017-11-06 | Stop reason: SDUPTHER

## 2017-11-03 NOTE — PROGRESS NOTES
Elder Duran is a 76 y.o. female who presents today for Anticoagulation monitoring. Indication: Atrial Fibrillation  INR Goal: 2.0-3.0. Current dose:  Coumadin 2.5 mg Tues/ thurs  5 mg all other days  Missed Coumadin Doses:  None  Medication Changes:  no  Dietary Changes:  no    Symptoms: taking coumadin appropriately without any bleeding. Latest INRs:  Lab Results   Component Value Date/Time    INR, External 1.6 12/02/2016    INR, External 3.4/40.8 05/13/2016 11:55 AM    INR, External 2.3/28.0 04/29/2016 02:09 PM    INR POC 3.0 10/06/2017 03:30 PM    INR POC 3.4 05/30/2017 03:33 PM    INR POC 4.5 05/22/2017 11:55 AM        New Coumadin dose:.current treatment plan is effective, no change in therapy. Next check to be scheduled for  2 weeks.

## 2017-11-03 NOTE — PROGRESS NOTES
[address], [facility] [place]   [phone]     11/3/2017  Chief Complaint   Patient presents with    Anticoagulation    GERD    Wax in Ear     check       HPI  Ms. Chris Forbes is a 76 y.o. female   Increasing heartburn symptoms occurring daily. Patient reports taking TUMS with minimal relief          Past Medical History:   Diagnosis Date    Atrial fibrillation ([address] 12/2/2013    Cardiomegaly     CKD (chronic kidney disease)     COPD     Degenerative spondylolisthesis     Diabetes (HCC)     IDDM    GERD (gastroesophageal reflux disease)     Hyperlipidemia     Hypertension     Hypokalemia     Myalgia     Osteoarthritis of knee     Rheumatoid arthritis(714.0) 12/2/2013    Tachycardia     Venous insufficiency     Vitamin D deficiency        Allergies   Allergen Reactions    Statins-Hmg-Coa Reductase Inhibitors Myalgia       Current Outpatient Prescriptions   Medication Sig    mometasone (ELOCON) 0.1 % topical cream APPLY TO AFFECTED AREA DAILY    venlafaxine-SR (EFFEXOR-XR) 37.5 mg capsule Take 1 Cap by mouth daily. Indications: fibro, nerves, replaces cymbalta    HYDROcodone-acetaminophen (NORCO) 7.5-325 mg per tablet Take 1 Tab by mouth two (2) times daily as needed for Pain. Max Daily Amount: 2 Tabs.  ALPRAZolam (XANAX) 0.25 mg tablet TAKE 1 TABLET BY MOUTH TWICE DAILY AS NEEDED FOR ANXIETY    raNITIdine (ZANTAC) 300 mg tablet Take 1 Tab by mouth daily. Indications: Heartburn    warfarin (COUMADIN) 5 mg tablet 1/2 tablet on Tues and Thurs and 1 tablet all other days  Indications: prevent stroke    atorvastatin (LIPITOR) 20 mg tablet Take 1 Tab by mouth daily. Indications: mixed hyperlipidemia    naloxone 4 mg/actuation spry Use 1 spray intranasally into 1 nostril. Use a new Narcan nasal spray for subsequent doses and administer into alternating nostrils. May repeat every 2 to 3 minutes as needed.   Indications: OPIATE-INDUCED RESPIRATORY DEPRESSION    atenolol (TENORMIN) 50 mg tablet Take 1 Tab by mouth daily. Indications: pressure and heart    Wheel Chair chelle Man wheelchair. for mobility in the home. Repl prior wheelchair that is falling apart. 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  amLODIPine (NORVASC) 10 mg tablet Take 1 Tab by mouth daily. Indications: pressure    furosemide (LASIX) 40 mg tablet Take 1 Tab by mouth daily.  lisinopril (PRINIVIL, ZESTRIL) 20 mg tablet TAKE 1 TABLET BY MOUTH DAILY for pressure    insulin NPH/insulin regular (NOVOLIN 70/30, HUMULIN 70/30) 100 unit/mL (70-30) injection 6 units AM with breakfast and 10 units with dinner  Indications: sugar, replaces lantus    Insulin Syringe-Needle U-100 0.3 mL 30 x 1/2\" syrg Use as directed    BD INSULIN SYRINGE ULTRA-FINE 1/2 mL 30 x 1/2\" syrg INJECT INSULIN DAILY    docusate sodium (STOOL SOFTENER) 100 mg capsule Take 100 mg by mouth two (2) times daily as needed for Constipation.  ergocalciferol (VITAMIN D2) 50,000 unit capsule Take 1 Cap by mouth every seven (7) days. No current facility-administered medications for this visit. Visit Vitals    /72 (BP 1 Location: Left arm, BP Patient Position: Sitting)    Pulse 72    Temp 96.8 °F (36 °C) (Temporal)    Resp 18    Ht 5' 2\" (1.575 m)    Wt 172 lb (78 kg)    SpO2 97%    BMI 31.46 kg/m2     Physical Exam   HENT:   Right Ear: Decreased hearing is noted. Left Ear: Tympanic membrane normal.   Vitals reviewed. ICD-10-CM ICD-9-CM    1. Atrial fibrillation, unspecified type ([facility]) I48.91 427.31 COLLECTION CAPILLARY BLOOD SPECIMEN      AMB POC PT/INR   2. Depression, unspecified depression type F32.9 311 venlafaxine-SR (EFFEXOR-XR) 37.5 mg capsule   3. Rheumatoid arthritis involving multiple sites with positive rheumatoid factor (HCC) M05.79 714.0 HYDROcodone-acetaminophen (NORCO) 7.5-325 mg per tablet   4.  Spondylosis of lumbosacral region, unspecified spinal osteoarthritis complication status G89.555 721.3 HYDROcodone-acetaminophen (NORCO) 7.5-325 mg per tablet   5. Sacroiliac joint disease M53.3 724.6 HYDROcodone-acetaminophen (NORCO) 7.5-325 mg per tablet   6. Chronic narcotic use F11.90 305.50 HYDROcodone-acetaminophen (NORCO) 7.5-325 mg per tablet   7. Fibromyalgia M79.7 729.1 ALPRAZolam (XANAX) 0.25 mg tablet   8. Gastroesophageal reflux disease, esophagitis presence not specified K21.9 530.81 raNITIdine (ZANTAC) 300 mg tablet     Refilled medication  Patient did not want to wait to have her ear flushed today.        Donnie Soulier PA-C, P

## 2017-11-06 RX ORDER — MOMETASONE FUROATE 1 MG/G
CREAM TOPICAL
Qty: 15 G | Refills: 2 | Status: SHIPPED | OUTPATIENT
Start: 2017-11-06 | End: 2018-01-24 | Stop reason: SDUPTHER

## 2017-11-06 NOTE — TELEPHONE ENCOUNTER
Requested Prescriptions     Pending Prescriptions Disp Refills    mometasone (ELOCON) 0.1 % topical cream 15 g 2     Sig: APPLY TO AFFECTED AREA DAILY

## 2017-12-20 ENCOUNTER — OFFICE VISIT (OUTPATIENT)
Dept: FAMILY MEDICINE CLINIC | Age: 77
End: 2017-12-20

## 2017-12-20 VITALS
SYSTOLIC BLOOD PRESSURE: 140 MMHG | HEART RATE: 77 BPM | RESPIRATION RATE: 20 BRPM | HEIGHT: 62 IN | WEIGHT: 168 LBS | BODY MASS INDEX: 30.91 KG/M2 | DIASTOLIC BLOOD PRESSURE: 82 MMHG | OXYGEN SATURATION: 97 % | TEMPERATURE: 96.8 F

## 2017-12-20 DIAGNOSIS — I10 ESSENTIAL HYPERTENSION: Primary | ICD-10-CM

## 2017-12-20 DIAGNOSIS — E11.9 TYPE 2 DIABETES MELLITUS WITHOUT COMPLICATION, WITH LONG-TERM CURRENT USE OF INSULIN (HCC): ICD-10-CM

## 2017-12-20 DIAGNOSIS — G89.29 CHRONIC MIDLINE LOW BACK PAIN WITHOUT SCIATICA: ICD-10-CM

## 2017-12-20 DIAGNOSIS — Z79.01 LONG TERM CURRENT USE OF ANTICOAGULANT: ICD-10-CM

## 2017-12-20 DIAGNOSIS — Z79.4 TYPE 2 DIABETES MELLITUS WITHOUT COMPLICATION, WITH LONG-TERM CURRENT USE OF INSULIN (HCC): ICD-10-CM

## 2017-12-20 DIAGNOSIS — E11.21 TYPE 2 DIABETES MELLITUS WITH NEPHROPATHY (HCC): ICD-10-CM

## 2017-12-20 DIAGNOSIS — H61.23 BILATERAL IMPACTED CERUMEN: ICD-10-CM

## 2017-12-20 DIAGNOSIS — I48.91 ATRIAL FIBRILLATION, UNSPECIFIED TYPE (HCC): ICD-10-CM

## 2017-12-20 DIAGNOSIS — Z63.4 GRIEF AT LOSS OF CHILD: ICD-10-CM

## 2017-12-20 DIAGNOSIS — F43.21 GRIEF AT LOSS OF CHILD: ICD-10-CM

## 2017-12-20 DIAGNOSIS — M79.7 FIBROMYALGIA: ICD-10-CM

## 2017-12-20 DIAGNOSIS — M54.50 CHRONIC MIDLINE LOW BACK PAIN WITHOUT SCIATICA: ICD-10-CM

## 2017-12-20 LAB
INR BLD: 2.3
PT POC: 27.4 SECONDS
VALID INTERNAL CONTROL?: YES

## 2017-12-20 RX ORDER — ALPRAZOLAM 0.25 MG/1
TABLET ORAL
Qty: 45 TAB | Refills: 1 | Status: CANCELLED | OUTPATIENT
Start: 2017-12-20

## 2017-12-20 RX ORDER — TRAMADOL HYDROCHLORIDE 50 MG/1
50 TABLET ORAL
Qty: 90 TAB | Refills: 0 | Status: SHIPPED | OUTPATIENT
Start: 2017-12-20 | End: 2018-01-19 | Stop reason: SDUPTHER

## 2017-12-20 SDOH — SOCIAL STABILITY - SOCIAL INSECURITY: DISSAPEARANCE AND DEATH OF FAMILY MEMBER: Z63.4

## 2017-12-20 NOTE — MR AVS SNAPSHOT
Visit Information Date & Time Provider Department Dept. Phone Encounter #  
 12/20/2017 11:00 AM Lila Matthews NP [address] [phone] [id number] Follow-up Instructions Return in about 4 months (around 4/20/2018). Upcoming Health Maintenance Date Due MICROALBUMIN Q1 1/25/1950 OSTEOPOROSIS SCREENING (DEXA) 1/25/2003 EYE EXAM RETINAL OR DILATED Q1 5/19/2017 HEMOGLOBIN A1C Q6M 11/22/2017 MEDICARE YEARLY EXAM 3/28/2018 GLAUCOMA SCREENING Q2Y 5/19/2018 FOOT EXAM Q1 5/22/2018 LIPID PANEL Q1 5/22/2018 Pneumococcal 65+ Low/Medium Risk (2 of 2 - PPSV23) 12/20/2018 DTaP/Tdap/Td series (2 - Td) 3/7/2027 Allergies as of 12/20/2017  Review Complete On: 12/20/2017 By: Lila Matthews NP Severity Noted Reaction Type Reactions Statins-hmg-coa Reductase Inhibitors  10/19/2015    Myalgia Current Immunizations  Never Reviewed No immunizations on file. Not reviewed this visit You Were Diagnosed With   
  
 Codes Comments Essential hypertension    -  Primary ICD-10-CM: I10 
ICD-9-CM: 401.9 Fibromyalgia     ICD-10-CM: M79.7 ICD-9-CM: 729.1 BMI 30.0-30.9,adult     ICD-10-CM: Z68.30 ICD-9-CM: V85.30 Type 2 diabetes mellitus with nephropathy (HCC)     ICD-10-CM: E11.21 
ICD-9-CM: 250.40, 583.81 Chronic midline low back pain without sciatica     ICD-10-CM: M54.5, G89.29 ICD-9-CM: 724.2, 338.29 Type 2 diabetes mellitus without complication, with long-term current use of insulin (HCC)     ICD-10-CM: E11.9, Z79.4 ICD-9-CM: 250.00, V58.67 Atrial fibrillation, unspecified type ([facility] [address] [address]     ICD-10-CM: I48.91 
ICD-9-CM: 427.31 Bilateral impacted cerumen     ICD-10-CM: H61.23 
ICD-9-CM: 380.4 Vitals BP Pulse Temp Resp Height(growth percentile) Weight(growth percentile)  140/82 (BP 1 Location: Left arm, BP Patient Position: Sitting) 77 96.8 °F (36 °C) (Temporal) 20 5' 2\" (1.575 m) 168 lb (76.2 kg) SpO2 BMI OB Status Smoking Status 97% 30.73 kg/m2 Postmenopausal Never Smoker BMI and BSA Data Body Mass Index Body Surface Area 30.73 kg/m 2 1.83 m 2 Preferred Pharmacy Pharmacy Name Phone [address], [facility] [phone] Your Updated Medication List  
  
   
This list is accurate as of: 12/20/17 12:43 PM.  Always use your most recent med list.  
  
  
  
  
 ALPRAZolam 0.25 mg tablet Commonly known as:  XANAX  
TAKE 1 TABLET BY MOUTH TWICE DAILY AS NEEDED FOR ANXIETY  
  
 amLODIPine 10 mg tablet Commonly known as:  [place] Take 1 Tab by mouth daily. Indications: pressure  
  
 atenolol 50 mg tablet Commonly known as:  TENORMIN Take 1 Tab by mouth daily. Indications: pressure and heart  
  
 atorvastatin 20 mg tablet Commonly known as:  LIPITOR Take 1 Tab by mouth daily. Indications: mixed hyperlipidemia BD INSULIN SYRINGE ULTRA-FINE 1/2 mL 30 gauge x 1/2\" Syrg Generic drug:  Insulin Syringe-Needle U-100 INJECT INSULIN DAILY  
  
 ergocalciferol 50,000 unit capsule Commonly known as:  VITAMIN D2 Take 1 Cap by mouth every seven (7) days. furosemide 40 mg tablet Commonly known as:  LASIX Take 1 Tab by mouth daily. insulin NPH/insulin regular 100 unit/mL (70-30) injection Commonly known as:  NOVOLIN 70/30, HUMULIN 70/30  
6 units AM with breakfast and 10 units with dinner  Indications: type 2 diabetes mellitus, sugar, replaces lantus Insulin Syringe-Needle U-100 0.3 mL 30 gauge x 1/2\" Syrg Use as directed  
  
 lisinopril 20 mg tablet Commonly known as:  PRINIVIL, ZESTRIL  
TAKE 1 TABLET BY MOUTH DAILY for pressure  
  
 mometasone 0.1 % topical cream  
Commonly known as:  ELOCON  
APPLY TO AFFECTED AREA DAILY  
  
 naloxone 4 mg/actuation nasal spray Commonly known as:  [employer]  
 Use 1 spray intranasally into 1 nostril. Use a new Narcan nasal spray for subsequent doses and administer into alternating nostrils. May repeat every 2 to 3 minutes as needed. Indications: OPIATE-INDUCED RESPIRATORY DEPRESSION  
  
 raNITIdine 300 mg tablet Commonly known as:  ZANTAC Take 1 Tab by mouth daily. Indications: Heartburn STOOL SOFTENER 100 mg capsule Generic drug:  docusate sodium Take 100 mg by mouth two (2) times daily as needed for Constipation. traMADol 50 mg tablet Commonly known as:  ULTRAM  
Take 1 Tab by mouth every eight (8) hours as needed for Pain. Max Daily Amount: 150 mg. Indications: Pain  
  
 venlafaxine-SR 37.5 mg capsule Commonly known as:  EFFEXOR-XR Take 1 Cap by mouth daily. Indications: fibro, nerves, replaces cymbalta  
  
 warfarin 5 mg tablet Commonly known as:  COUMADIN  
1/2 tablet on Tues and Th and 1 tablet all other days  Indications: prevent stroke [address] [facility] wheelchair. for mobility in the home. Repl prior wheelchair that is falling apart. 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 Prescriptions Printed Refills  
 traMADol (ULTRAM) 50 mg tablet 0 Sig: Take 1 Tab by mouth every eight (8) hours as needed for Pain. Max Daily Amount: 150 mg. Indications: Pain Class: Print Route: Oral  
  
Prescriptions Sent to Pharmacy Refills  
 insulin NPH/insulin regular (NOVOLIN 70/30, HUMULIN 70/30) 100 unit/mL (70-30) injection 11 Si units AM with breakfast and 10 units with dinner  Indications: type 2 diabetes mellitus, sugar, replaces lantus Class: Normal  
 Pharmacy: [address], [address] [facility] [place] [facility] #: [phone] We Performed the Following CBC WITH AUTOMATED DIFF [98771 CPT(R)] COLLECTION VENOUS BLOOD,VENIPUNCTURE [id number] CPT(R)] HEMOGLOBIN A1C WITH EAG [68697 CPT(R)] METABOLIC PANEL, COMPREHENSIVE [16478 CPT(R)] PROTHROMBIN TIME + INR [14837 CPT(R)] REMOVAL IMPACTED CERUMEN IRRIGATION/LVG UNILAT [id number] CPT(R)] Follow-up Instructions Return in about 4 months (around 4/20/2018). To-Do List   
 12/20/2017 Lab:  MICROALBUMIN, UR, RAND W/ MICROALBUMIN/CREA RATIO Introducing [facility]! Heath Sharma introduces reQall patient portal. Now you can access parts of your medical record, email your doctor's office, and request medication refills online. 1. In your internet browser, go to [email or web address] 2. Click on the First Time User? Click Here link in the Sign In box. You will see the New Member Sign Up page. 3. Enter your reQall Access Code exactly as it appears below. You will not need to use this code after youve completed the sign-up process. If you do not sign up before the expiration date, you must request a new code. · reQall Access Code: [email or web address] Expires: 2/1/2018  2:09 PM 
 
4. Enter the last four digits of your Social Security Number (xxxx) and Date of Birth (mm/dd/yyyy) as indicated and click Submit. You will be taken to the next sign-up page. 5. Create a reQall ID. This will be your reQall login ID and cannot be changed, so think of one that is secure and easy to remember. 6. Create a reQall password. You can change your password at any time. 7. Enter your Password Reset Question and Answer. This can be used at a later time if you forget your password. 8. Enter your e-mail address. You will receive e-mail notification when new information is available in [address] 9. Click Sign Up. You can now view and download portions of your medical record. 10. Click the Download Summary menu link to download a portable copy of your medical information. If you have questions, please visit the Frequently Asked Questions section of the reQall website. Remember, reQall is NOT to be used for urgent needs. For medical emergencies, dial 911. Now available from your iPhone and Android! Please provide this summary of care documentation to your next provider. Your primary care clinician is listed as Manish Mancera. If you have any questions after today's visit, please call [phone].

## 2017-12-20 NOTE — PROGRESS NOTES
Subjective:     Radha Jimenez is a 76 y.o. female who presents today with her adult hanna following:  Chief Complaint   Patient presents with    Diabetes     checkup    Hypertension    Hearing Problem     check for rylee Marino presents with multiple chronic disease listed below. COMPLIANT WITH MEDICATION:   HTN; Denies chest pain, dyspnea, palpitations, headache and blurred vision. Blood pressure normotensive. DM:  Diabetes. Sugars controlled moderately  Hypoglycemia: none  Tolerating current treatment moderately  Current medications include diet  Novolin 70/30  6 units with breakfast and 10 units with dinner. Lab Results   Component Value Date/Time    Hemoglobin A1c 8.4 05/22/2017 12:26 PM    Hemoglobin A1c 8.1 02/10/2017 04:19 PM    Hemoglobin A1c 8.0 12/02/2016 12:06 PM    Glucose 149 05/22/2017 12:26 PM    Glucose  07/28/2014 03:30 PM    LDL, calculated 171 05/22/2017 12:26 PM    Creatinine 1.12 05/22/2017 12:26 PM     No results found for: MCACR, MCA1, MCA2, MCA3, MCAU, MCAU2, MCALPOCT    Last eye exam performed  greather than 1 year ago and was developing cataracts. Last foot exam 05/22/2017 performed less than 1 year ago. warm, good capillary refill      BMI:  Discussed the patient's BMI with her. The BMI follow up plan is as follows:     dietary management education, guidance, and counseling  encourage exercise  monitor weight  prescribed dietary intake    An After Visit Summary was printed and given to the patient. Fibromyalgia:responds well to tramadol. No longer taking norco    Hearing problem: Ears feel full  Will check for impacted cerumen    Grief loss of two grown children a son and a daughter recently.  Request refill of alprazolam. Stable on Effor- XR    Chronic Back Pain: responds well to tramadol no longer takin Norco      ROS:  Gen: denies fever, chills, fatigue, weight loss, weight gain  HEENT:denies blurry vision, nasal congestion, sore throat  Resp: denies dypsnea, cough, wheezing  CV: denies chest pain radiating to the jaws or arms, palpitations, lower extremity edema  Abd: denies nausea, vomiting, diarrhea, constipation  Neuro: denies numbness/tingling  Endo: denies polyuria, polydipsia, heat/cold intolerance  Heme: no lymphadenopathy    Anticoagulation. Dx:  Atrial fibrillation. Current symptoms: none  Current control agent: B-blocker  Current anticoagulant: warfarin2.5 mg on Tuesdays and Thursdays and 5 mg on all the other days. History of bleeding problems: none  Lab Results   Component Value Date/Time    INR, External 1.8 11/03/2017    INR, External 1.6 12/02/2016    INR, External 3.4/40.8 05/13/2016 11:55 AM    INR POC 2.3 12/20/2017 12:46 PM    INR POC 1.8 11/03/2017 02:46 PM    INR POC 3.0 10/06/2017 03:30 PM       Allergies   Allergen Reactions    Statins-Hmg-Coa Reductase Inhibitors Myalgia         Current Outpatient Prescriptions:     traMADol (ULTRAM) 50 mg tablet, Take 1 Tab by mouth every eight (8) hours as needed for Pain. Max Daily Amount: 150 mg. Indications: Pain, Disp: 90 Tab, Rfl: 0    insulin NPH/insulin regular (NOVOLIN 70/30, HUMULIN 70/30) 100 unit/mL (70-30) injection, 6 units AM with breakfast and 10 units with dinner  Indications: type 2 diabetes mellitus, sugar, replaces lantus, Disp: 10 mL, Rfl: 11    mometasone (ELOCON) 0.1 % topical cream, APPLY TO AFFECTED AREA DAILY, Disp: 15 g, Rfl: 2    venlafaxine-SR (EFFEXOR-XR) 37.5 mg capsule, Take 1 Cap by mouth daily. Indications: fibro, nerves, replaces cymbalta, Disp: 30 Cap, Rfl: 11    ALPRAZolam (XANAX) 0.25 mg tablet, TAKE 1 TABLET BY MOUTH TWICE DAILY AS NEEDED FOR ANXIETY, Disp: 45 Tab, Rfl: 1    raNITIdine (ZANTAC) 300 mg tablet, Take 1 Tab by mouth daily.  Indications: Heartburn, Disp: 30 Tab, Rfl: 6    warfarin (COUMADIN) 5 mg tablet, 1/2 tablet on Tues and Thurs and 1 tablet all other days  Indications: prevent stroke, Disp: 90 Tab, Rfl: 3    atorvastatin (LIPITOR) 20 mg tablet, Take 1 Tab by mouth daily. Indications: mixed hyperlipidemia, Disp: 30 Tab, Rfl: 6    atenolol (TENORMIN) 50 mg tablet, Take 1 Tab by mouth daily. Indications: pressure and heart, Disp: 90 Tab, Rfl: 3    Wheel Chair chelle, Man wheelchair. for mobility in the home. Repl prior wheelchair that is falling apart. Dx M06.9 and M62.81 Rheum arthritis and weak trunk mm., Disp: 1 Each, Rfl: 0    amLODIPine (NORVASC) 10 mg tablet, Take 1 Tab by mouth daily. Indications: pressure, Disp: 90 Tab, Rfl: 3    ergocalciferol (VITAMIN D2) 50,000 unit capsule, Take 1 Cap by mouth every seven (7) days. , Disp: 4 Cap, Rfl: 3    Insulin Syringe-Needle U-100 0.3 mL 30 x 1/2\" syrg, Use as directed, Disp: 100 Syringe, Rfl: 3    BD INSULIN SYRINGE ULTRA-FINE 1/2 mL 30 x 1/2\" syrg, INJECT INSULIN DAILY, Disp: 100 Syringe, Rfl: 11    docusate sodium (STOOL SOFTENER) 100 mg capsule, Take 100 mg by mouth two (2) times daily as needed for Constipation. , Disp: , Rfl:     naloxone 4 mg/actuation spry, Use 1 spray intranasally into 1 nostril. Use a new Narcan nasal spray for subsequent doses and administer into alternating nostrils. May repeat every 2 to 3 minutes as needed. Indications: OPIATE-INDUCED RESPIRATORY DEPRESSION, Disp: 1 Package, Rfl: 0    furosemide (LASIX) 40 mg tablet, Take 1 Tab by mouth daily. , Disp: 90 Tab, Rfl: 3    lisinopril (PRINIVIL, ZESTRIL) 20 mg tablet, TAKE 1 TABLET BY MOUTH DAILY for pressure, Disp: 90 Tab, Rfl: 3    Past Medical History:   Diagnosis Date    Atrial fibrillation ([address] 12/2/2013    Cardiomegaly     CKD (chronic kidney disease)     COPD     Degenerative spondylolisthesis     Diabetes (HCC)     IDDM    GERD (gastroesophageal reflux disease)     Hyperlipidemia     Hypertension     Hypokalemia     Myalgia     Osteoarthritis of knee     Rheumatoid arthritis(714.0) 12/2/2013    Tachycardia     Venous insufficiency     Vitamin D deficiency Past Surgical History:   Procedure Laterality Date    HX HYSTERECTOMY      PARTIAL HYSTERECTOMY       History   Smoking Status    Never Smoker   Smokeless Tobacco    Never Used       Social History     Social History    Marital status:      Spouse name: N/A    Number of children: N/A    Years of education: N/A     Social History Main Topics    Smoking status: Never Smoker    Smokeless tobacco: Never Used    Alcohol use No    Drug use: No    Sexual activity: Not Currently     Partners: Male     Birth control/ protection: None     Other Topics Concern     Service No    Blood Transfusions No    Caffeine Concern Yes    Occupational Exposure No    Hobby Hazards No    Sleep Concern No    Stress Concern No    Weight Concern Yes     losing weight    Special Diet No    Back Care Yes     pain    Exercise No    Bike Helmet No     n/a    Seat Belt No    Self-Exams No     Social History Narrative       Family History   Problem Relation Age of Onset    Diabetes Father     Diabetes Sister     Heart Disease Brother      ENLARGED HEART    Elevated Lipids Brother     Diabetes Sister          Objective:     Visit Vitals    /82 (BP 1 Location: Left arm, BP Patient Position: Sitting)    Pulse 77    Temp 96.8 °F (36 °C) (Temporal)    Resp 20    Ht 5' 2\" (1.575 m)    Wt 168 lb (76.2 kg)    SpO2 97%    BMI 30.73 kg/m2     Body mass index is 30.73 kg/(m^2). General: Alert and oriented. No acute distress. Well nourished, wheel chair dependent. HEENT :  Ears:Impacted cerumen bilaterally after irrigation without instrumentation TMs are normal. Canals are clear. Eyes: pupils equal, round, react to light and accommodation. Extra ocular movements intact. Nose: patent. Mouth and throat is clear. Neck:supple full range of motion no thyromegaly. Trachea midline, No carotid bruits.  No significant lymphadenopathy  Lungs[de-identified] clear to auscultation without wheezes, rales, or rhonchi. Heart :RRR, S1 & S2 are normal intensity. No murmur; no gallop  Abdomen: bowel sounds active. No tenderness, guarding, rebound, masses, hepatic or spleen enlargement  Back: no CVA tenderness. Extremities: without clubbing, cyanosis, or edema  Pulses: radial and femoral pulses are normal  Neuro: HMF intact. Cranial nerves II through XII grossly normal.  Motor: is 4 over 5 and symmetrical.  Wheel chair dependent. Deep tendon reflexes: +2 equal    Results for orders placed or performed in visit on 12/20/17   AMB POC PT/INR   Result Value Ref Range    VALID INTERNAL CONTROL POC Yes     Prothrombin time (POC) 27.4 seconds    INR POC 2.3        Results for orders placed or performed in visit on 12/20/17   AMB POC PT/INR   Result Value Ref Range    VALID INTERNAL CONTROL POC Yes     Prothrombin time (POC) 27.4 seconds    INR POC 2.3        Assessment/ Plan:     Diagnoses and all orders for this visit:    1. Essential hypertension  -     CBC WITH AUTOMATED DIFF  -     HEMOGLOBIN A1C WITH EAG  -     COLLECTION VENOUS BLOOD,VENIPUNCTURE    2. Fibromyalgia  -     traMADol (ULTRAM) 50 mg tablet; Take 1 Tab by mouth every eight (8) hours as needed for Pain. Max Daily Amount: 150 mg. Indications: Pain    3. BMI 30.0-30.9,adult    4. Type 2 diabetes mellitus with nephropathy (HCC)  -     CBC WITH AUTOMATED DIFF  -     HEMOGLOBIN A1C WITH EAG  -     METABOLIC PANEL, COMPREHENSIVE  -     COLLECTION VENOUS BLOOD,VENIPUNCTURE  -     MICROALBUMIN, UR, RAND W/ MICROALBUMIN/CREA RATIO; Future    5. Chronic midline low back pain without sciatica  -     traMADol (ULTRAM) 50 mg tablet; Take 1 Tab by mouth every eight (8) hours as needed for Pain. Max Daily Amount: 150 mg. Indications: Pain    6.  Type 2 diabetes mellitus without complication, with long-term current use of insulin (HCC)  -     insulin NPH/insulin regular (NOVOLIN 70/30, HUMULIN 70/30) 100 unit/mL (70-30) injection; 6 units AM with breakfast and 10 units with dinner  Indications: type 2 diabetes mellitus, sugar, replaces lantus  -     CBC WITH AUTOMATED DIFF  -     HEMOGLOBIN A1C WITH EAG  -     METABOLIC PANEL, COMPREHENSIVE  -     COLLECTION VENOUS BLOOD,VENIPUNCTURE  -     MICROALBUMIN, UR, RAND W/ MICROALBUMIN/CREA RATIO; Future    7. Atrial fibrillation, unspecified type (HCC)  -     AMB POC PT/INR    8. Bilateral impacted cerumen  -     REMOVAL IMPACTED CERUMEN IRRIGATION/LVG UNILAT    9. Long term current use of anticoagulant         1. Essential hypertension    2. Fibromyalgia    3. BMI 30.0-30.9,adult    4. Type 2 diabetes mellitus with nephropathy ([facility]    5. Chronic midline low back pain without sciatica    6. Type 2 diabetes mellitus without complication, with long-term current use of insulin ([facility]    7. Atrial fibrillation, unspecified type ([facility]    8. Bilateral impacted cerumen    9. Long term current use of anticoagulant        Orders Placed This Encounter    COLLECTION VENOUS BLOOD,VENIPUNCTURE    REMOVAL IMPACTED CERUMEN IRRIGATION/LVG UNILAT    CBC WITH AUTOMATED DIFF    HEMOGLOBIN A1C WITH EAG    METABOLIC PANEL, COMPREHENSIVE    MICROALBUMIN, UR, RAND W/ MICROALBUMIN/CREA RATIO     Standing Status:   Future     Standing Expiration Date:   2018    AMB POC PT/INR    traMADol (ULTRAM) 50 mg tablet     Sig: Take 1 Tab by mouth every eight (8) hours as needed for Pain. Max Daily Amount: 150 mg. Indications: Pain     Dispense:  90 Tab     Refill:  0    insulin NPH/insulin regular (NOVOLIN 70/30, HUMULIN 70/30) 100 unit/mL (70-30) injection     Si units AM with breakfast and 10 units with dinner  Indications: type 2 diabetes mellitus, sugar, replaces lantus     Dispense:  10 mL     Refill:  11     Patient and family would like a pen injection device if available. Verbal and written instructions (see AVS) provided.  Patient expresses understanding of diagnosis and treatment plan.     Follow-up Disposition:  Return in about 4 months (around 4/20/2018).       Lucinda Bernheim, FNP-C

## 2017-12-21 ENCOUNTER — TELEPHONE (OUTPATIENT)
Dept: FAMILY MEDICINE CLINIC | Age: 77
End: 2017-12-21

## 2017-12-21 LAB
ALBUMIN SERPL-MCNC: 3.6 G/DL (ref 3.5–4.8)
ALBUMIN/GLOB SERPL: 1 {RATIO} (ref 1.2–2.2)
ALP SERPL-CCNC: 57 IU/L (ref 39–117)
ALT SERPL-CCNC: 7 IU/L (ref 0–32)
AST SERPL-CCNC: 19 IU/L (ref 0–40)
BASOPHILS # BLD AUTO: 0 X10E3/UL (ref 0–0.2)
BASOPHILS NFR BLD AUTO: 0 %
BILIRUB SERPL-MCNC: 0.4 MG/DL (ref 0–1.2)
BUN SERPL-MCNC: 9 MG/DL (ref 8–27)
BUN/CREAT SERPL: 11 (ref 12–28)
CALCIUM SERPL-MCNC: 8.8 MG/DL (ref 8.7–10.3)
CHLORIDE SERPL-SCNC: 101 MMOL/L (ref 96–106)
CO2 SERPL-SCNC: 23 MMOL/L (ref 18–29)
CREAT SERPL-MCNC: 0.84 MG/DL (ref 0.57–1)
EOSINOPHIL # BLD AUTO: 0 X10E3/UL (ref 0–0.4)
EOSINOPHIL NFR BLD AUTO: 1 %
ERYTHROCYTE [DISTWIDTH] IN BLOOD BY AUTOMATED COUNT: 14.4 % (ref 12.3–15.4)
EST. AVERAGE GLUCOSE BLD GHB EST-MCNC: 166 MG/DL
GLOBULIN SER CALC-MCNC: 3.6 G/DL (ref 1.5–4.5)
GLUCOSE SERPL-MCNC: 135 MG/DL (ref 65–99)
HBA1C MFR BLD: 7.4 % (ref 4.8–5.6)
HCT VFR BLD AUTO: 32.3 % (ref 34–46.6)
HGB BLD-MCNC: 10.5 G/DL (ref 11.1–15.9)
IMM GRANULOCYTES # BLD: 0 X10E3/UL (ref 0–0.1)
IMM GRANULOCYTES NFR BLD: 0 %
INR PPP: NORMAL
LYMPHOCYTES # BLD AUTO: 1.4 X10E3/UL (ref 0.7–3.1)
LYMPHOCYTES NFR BLD AUTO: 21 %
MCH RBC QN AUTO: 29.5 PG (ref 26.6–33)
MCHC RBC AUTO-ENTMCNC: 32.5 G/DL (ref 31.5–35.7)
MCV RBC AUTO: 91 FL (ref 79–97)
MONOCYTES # BLD AUTO: 0.5 X10E3/UL (ref 0.1–0.9)
MONOCYTES NFR BLD AUTO: 8 %
NEUTROPHILS # BLD AUTO: 4.7 X10E3/UL (ref 1.4–7)
NEUTROPHILS NFR BLD AUTO: 70 %
PLATELET # BLD AUTO: 286 X10E3/UL (ref 150–379)
POTASSIUM SERPL-SCNC: 4.2 MMOL/L (ref 3.5–5.2)
PROT SERPL-MCNC: 7.2 G/DL (ref 6–8.5)
PROTHROMBIN TIME: NORMAL S
RBC # BLD AUTO: 3.56 X10E6/UL (ref 3.77–5.28)
SODIUM SERPL-SCNC: 140 MMOL/L (ref 134–144)
WBC # BLD AUTO: 6.7 X10E3/UL (ref 3.4–10.8)

## 2017-12-21 NOTE — TELEPHONE ENCOUNTER
Labs reviewed HGBA1C  Down to 7.4 great job! CBC mild anemia of chronic disease no changes to medical plan.   MEtabolic panel good liver and kidney functions

## 2018-01-19 DIAGNOSIS — M54.50 CHRONIC MIDLINE LOW BACK PAIN WITHOUT SCIATICA: ICD-10-CM

## 2018-01-19 DIAGNOSIS — M79.7 FIBROMYALGIA: ICD-10-CM

## 2018-01-19 DIAGNOSIS — G89.29 CHRONIC MIDLINE LOW BACK PAIN WITHOUT SCIATICA: ICD-10-CM

## 2018-01-19 RX ORDER — TRAMADOL HYDROCHLORIDE 50 MG/1
50 TABLET ORAL
Qty: 10 TAB | Refills: 0 | Status: SHIPPED | OUTPATIENT
Start: 2018-01-19 | End: 2018-01-25 | Stop reason: SDUPTHER

## 2018-01-19 NOTE — TELEPHONE ENCOUNTER
Patient is asking for a refill on Tramadol. I had called her about a request from [address] about a wheelchair and she says she did not request it nor did her son Scar Red. She states she has a wheelchair.

## 2018-01-23 ENCOUNTER — TELEPHONE (OUTPATIENT)
Dept: FAMILY MEDICINE CLINIC | Age: 78
End: 2018-01-23

## 2018-01-23 NOTE — TELEPHONE ENCOUNTER
RX for Tramadol went to the wrong pharmacy. It should have gone to [facility]. Please correct.

## 2018-01-24 DIAGNOSIS — G89.29 CHRONIC MIDLINE LOW BACK PAIN WITHOUT SCIATICA: ICD-10-CM

## 2018-01-24 DIAGNOSIS — M79.7 FIBROMYALGIA: ICD-10-CM

## 2018-01-24 DIAGNOSIS — M54.50 CHRONIC MIDLINE LOW BACK PAIN WITHOUT SCIATICA: ICD-10-CM

## 2018-01-24 RX ORDER — MOMETASONE FUROATE 1 MG/G
CREAM TOPICAL
Qty: 45 G | Refills: 0 | Status: SHIPPED | OUTPATIENT
Start: 2018-01-24 | End: 2018-04-03 | Stop reason: SDUPTHER

## 2018-01-24 NOTE — TELEPHONE ENCOUNTER
Miles Lerma only did a short fill on med until you were back in office. Will you refill now with 30 day supply ?

## 2018-01-25 RX ORDER — TRAMADOL HYDROCHLORIDE 50 MG/1
50 TABLET ORAL
Qty: 90 TAB | Refills: 0 | Status: SHIPPED | OUTPATIENT
Start: 2018-01-25 | End: 2018-04-03 | Stop reason: SDUPTHER

## 2018-02-01 ENCOUNTER — TELEPHONE (OUTPATIENT)
Dept: FAMILY MEDICINE CLINIC | Age: 78
End: 2018-02-01

## 2018-02-01 NOTE — TELEPHONE ENCOUNTER
Patients granddaughter Stewart Fabian concerned about low blood sugars. Patient has not been getting prescribed insulin as ordered due to her sugars 106 to 107  and feeling jittery and nervous. They have decreased her insulin to 5 units only at bedtime and still drops her blood sugar.  Also reports decreased in appetite and eating less,please advise

## 2018-03-05 DIAGNOSIS — M79.7 FIBROMYALGIA: ICD-10-CM

## 2018-03-05 DIAGNOSIS — M54.50 CHRONIC MIDLINE LOW BACK PAIN WITHOUT SCIATICA: ICD-10-CM

## 2018-03-05 DIAGNOSIS — G89.29 CHRONIC MIDLINE LOW BACK PAIN WITHOUT SCIATICA: ICD-10-CM

## 2018-03-06 RX ORDER — TRAMADOL HYDROCHLORIDE 50 MG/1
50 TABLET ORAL
Qty: 90 TAB | Refills: 0 | OUTPATIENT
Start: 2018-03-06

## 2018-03-06 NOTE — TELEPHONE ENCOUNTER
On benzodiazepine will not fill opoid.    TENS Unit, heat/cold application with barrier to protect skin

## 2018-04-02 ENCOUNTER — TELEPHONE (OUTPATIENT)
Dept: FAMILY MEDICINE CLINIC | Age: 78
End: 2018-04-02

## 2018-04-03 ENCOUNTER — OFFICE VISIT (OUTPATIENT)
Dept: FAMILY MEDICINE CLINIC | Age: 78
End: 2018-04-03

## 2018-04-03 ENCOUNTER — TELEPHONE (OUTPATIENT)
Dept: FAMILY MEDICINE CLINIC | Age: 78
End: 2018-04-03

## 2018-04-03 VITALS
RESPIRATION RATE: 16 BRPM | BODY MASS INDEX: 32.94 KG/M2 | TEMPERATURE: 97.3 F | HEART RATE: 51 BPM | WEIGHT: 179 LBS | SYSTOLIC BLOOD PRESSURE: 130 MMHG | HEIGHT: 62 IN | DIASTOLIC BLOOD PRESSURE: 80 MMHG | OXYGEN SATURATION: 97 %

## 2018-04-03 DIAGNOSIS — M54.50 CHRONIC MIDLINE LOW BACK PAIN WITHOUT SCIATICA: ICD-10-CM

## 2018-04-03 DIAGNOSIS — G89.29 CHRONIC MIDLINE LOW BACK PAIN WITHOUT SCIATICA: ICD-10-CM

## 2018-04-03 DIAGNOSIS — I48.91 ATRIAL FIBRILLATION, UNSPECIFIED TYPE (HCC): ICD-10-CM

## 2018-04-03 DIAGNOSIS — E11.21 TYPE 2 DIABETES MELLITUS WITH NEPHROPATHY (HCC): ICD-10-CM

## 2018-04-03 DIAGNOSIS — M79.7 FIBROMYALGIA: Primary | ICD-10-CM

## 2018-04-03 DIAGNOSIS — Z00.00 ENCOUNTER FOR MEDICARE ANNUAL WELLNESS EXAM: ICD-10-CM

## 2018-04-03 LAB
ALBUMIN UR QL STRIP: 80 MG/L
CREATININE, URINE POC: 300 MG/DL
INR BLD: 1.1
MICROALBUMIN/CREAT RATIO POC: NORMAL MG/G
PT POC: 12.8 SECONDS
VALID INTERNAL CONTROL?: YES

## 2018-04-03 RX ORDER — TRAMADOL HYDROCHLORIDE 50 MG/1
50 TABLET ORAL
Qty: 90 TAB | Refills: 0 | Status: SHIPPED | OUTPATIENT
Start: 2018-04-03 | End: 2018-05-08 | Stop reason: SDUPTHER

## 2018-04-03 RX ORDER — MOMETASONE FUROATE 1 MG/G
CREAM TOPICAL
Qty: 45 G | Refills: 3 | Status: SHIPPED | OUTPATIENT
Start: 2018-04-03

## 2018-04-03 NOTE — PROGRESS NOTES
1. Have you been to the ER, urgent care clinic since your last visit? Hospitalized since your last visit? No    2. Have you seen or consulted any other health care providers outside of the [address] [place] [address] since your last visit? Include any pap smears or colon screening. No        Indication: Atrial Fibrillation  Current dose:  Coumadin 5 mg , 1/2 tab on Tues, Thurs. , and Sun. And 1 tablet all other days. Missed Coumadin Doses:  None  Medication Changes:  no  Dietary Changes:  yes - greens consumed on Easter    Symptoms: taking coumadin appropriately without any bleeding.     Results for orders placed or performed in visit on 04/03/18   AMB POC PT/INR   Result Value Ref Range    VALID INTERNAL CONTROL POC Yes     Prothrombin time (POC) 12.8 seconds    INR POC 1.1

## 2018-04-03 NOTE — MR AVS SNAPSHOT
[facility] 
 
 
 [address] [employer] [address] 
[phone] Patient: Kenyetta Cedillo MRN: [id number] LQJ:[id number] Visit Information Date & Time Provider Department Dept. Phone Encounter #  
 4/3/2018  2:30 PM Stephani Allred NP [facility] [address] [facility] [phone] Your Appointments 4/18/2018 11:00 AM  
ESTABLISHED PATIENT with Stephani Allred NP  
[address] [place] ([address]) Appt Note: 4 mo f/u  
 [address] [address]  
[address] [facility] [address] [address] Upcoming Health Maintenance Date Due MICROALBUMIN Q1 1/25/1950 EYE EXAM RETINAL OR DILATED Q1 5/19/2017 MEDICARE YEARLY EXAM 3/28/2018 GLAUCOMA SCREENING Q2Y 5/19/2018 FOOT EXAM Q1 5/22/2018 LIPID PANEL Q1 5/22/2018 HEMOGLOBIN A1C Q6M 6/20/2018 Pneumococcal 65+ Low/Medium Risk (2 of 2 - PPSV23) 12/20/2018 DTaP/Tdap/Td series (2 - Td) 3/7/2027 Allergies as of 4/3/2018  Review Complete On: 4/3/2018 By: Stephani Allred NP Severity Noted Reaction Type Reactions Statins-hmg-coa Reductase Inhibitors  10/19/2015    Myalgia Current Immunizations  Never Reviewed No immunizations on file. Not reviewed this visit You Were Diagnosed With   
  
 Codes Comments Fibromyalgia    -  Primary ICD-10-CM: M79.7 ICD-9-CM: 729.1 Atrial fibrillation, unspecified type ([facility]     ICD-10-CM: I48.91 
ICD-9-CM: 427.31 Type 2 diabetes mellitus with nephropathy (HCC)     ICD-10-CM: E11.21 
ICD-9-CM: 250.40, 583.81 Chronic midline low back pain without sciatica     ICD-10-CM: M54.5, G89.29 ICD-9-CM: 724.2, 338.29 Vitals BP Pulse Temp Resp Height(growth percentile) 130/80 (BP 1 Location: Left arm, BP Patient Position: Sitting) (!) 51 97.3 °F (36.3 °C) (Temporal) 16 5' 1.5\" (1.562 m) Weight(growth percentile) SpO2 BMI OB Status Smoking Status 179 lb (81.2 kg) 97% 33.27 kg/m2 Postmenopausal Never Smoker BMI and BSA Data Body Mass Index Body Surface Area  
 33.27 kg/m 2 1.88 m 2 Preferred Pharmacy Pharmacy Name Phone [address] Fabrice Jimenez [phone] Your Updated Medication List  
  
   
This list is accurate as of 4/3/18  4:52 PM.  Always use your most recent med list. amLODIPine 10 mg tablet Commonly known as:  Conner Rings Take 1 Tab by mouth daily. Indications: pressure  
  
 atenolol 50 mg tablet Commonly known as:  TENORMIN Take 1 Tab by mouth daily. Indications: pressure and heart  
  
 atorvastatin 20 mg tablet Commonly known as:  LIPITOR Take 1 Tab by mouth daily. Indications: mixed hyperlipidemia BD INSULIN SYRINGE  
by Does Not Apply route. 6 ml 31G  3/10 ml use as directed BD INSULIN SYRINGE ULTRA-FINE 1/2 mL 30 gauge x 1/2\" Syrg Generic drug:  Insulin Syringe-Needle U-100 INJECT INSULIN DAILY  
  
 ergocalciferol 50,000 unit capsule Commonly known as:  VITAMIN D2 Take 1 Cap by mouth every seven (7) days. furosemide 40 mg tablet Commonly known as:  LASIX Take 1 Tab by mouth daily. glucose blood VI test strips strip Commonly known as:  blood glucose test  
DX E11.9 Check blood glucose twice a day and prn (as needed)  
  
 insulin NPH/insulin regular 100 unit/mL (70-30) injection Commonly known as:  NOVOLIN 70/30, HUMULIN 70/30  
6 units AM with breakfast and 10 units with dinner  Indications: type 2 diabetes mellitus, sugar, replaces lantus Insulin Syringe-Needle U-100 0.3 mL 30 gauge x 1/2\" Syrg Use as directed  
  
 lisinopril 20 mg tablet Commonly known as:  PRINIVIL, ZESTRIL  
TAKE 1 TABLET BY MOUTH DAILY for pressure  
  
 mometasone 0.1 % topical cream  
Commonly known as:  ELOCON  
APPLY TO AFFECTED AREA DAILY naloxone 4 mg/actuation nasal spray Commonly known as:  [employer] Use 1 spray intranasally into 1 nostril. Use a new Narcan nasal spray for subsequent doses and administer into alternating nostrils. May repeat every 2 to 3 minutes as needed. Indications: OPIATE-INDUCED RESPIRATORY DEPRESSION  
  
 raNITIdine 300 mg tablet Commonly known as:  ZANTAC Take 1 Tab by mouth daily. Indications: Heartburn STOOL SOFTENER 100 mg capsule Generic drug:  docusate sodium Take 100 mg by mouth two (2) times daily as needed for Constipation. traMADol 50 mg tablet Commonly known as:  ULTRAM  
Take 1 Tab by mouth every eight (8) hours as needed for Pain. Max Daily Amount: 150 mg. Indications: FIBROMYALGIA, Pain  
  
 venlafaxine-SR 37.5 mg capsule Commonly known as:  EFFEXOR-XR Take 1 Cap by mouth daily. Indications: fibro, nerves, replaces cymbalta  
  
 warfarin 5 mg tablet Commonly known as:  COUMADIN  
1/2 tablet on Tues and Thurs and 1 tablet all other days  Indications: prevent stroke [facility] wheelchair. for mobility in the home. Repl prior wheelchair that is falling apart. 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 Prescriptions Printed Refills  
 traMADol (ULTRAM) 50 mg tablet 0 Sig: Take 1 Tab by mouth every eight (8) hours as needed for Pain. Max Daily Amount: 150 mg. Indications: FIBROMYALGIA, Pain Class: Print Route: Oral  
 mometasone (ELOCON) 0.1 % topical cream 3 Sig: APPLY TO AFFECTED AREA DAILY Class: Print We Performed the Following AMB POC PT/INR [47410 CPT(R)] AMB POC URINE, MICROALBUMIN, SEMIQUANT (3 RESULTS) [96170 CPT(R)] COLLECTION CAPILLARY BLOOD SPECIMEN [58478 CPT(R)] REFERRAL TO PALLIATIVE MEDICINE [JWP506 Custom] Referral Information Referral ID Referred By Referred To  
  
 [id number] Claudia Crawford MD   
   [address] [facility] [address] [facility], [address] Phone: [phone] Fax: [phone] Visits Status Start Date End Date 1 New Request 4/3/18 4/3/19 If your referral has a status of pending review or denied, additional information will be sent to support the outcome of this decision. Introducing [facility]! [facility] introduces [employer] patient portal. Now you can access parts of your medical record, email your doctor's office, and request medication refills online. 1. In your internet browser, go to [email or web address] 2. Click on the First Time User? Click Here link in the Sign In box. You will see the New Member Sign Up page. 3. Enter your [employer] Access Code exactly as it appears below. You will not need to use this code after youve completed the sign-up process. If you do not sign up before the expiration date, you must request a new code. · [employer] Access Code: [email or web address] Expires: 7/2/2018  4:51 PM 
 
4. Enter the last four digits of your Social Security Number (xxxx) and Date of Birth (mm/dd/yyyy) as indicated and click Submit. You will be taken to the next sign-up page. 5. Create a [employer] ID. This will be your [employer] login ID and cannot be changed, so think of one that is secure and easy to remember. 6. Create a [employer] password. You can change your password at any time. 7. Enter your Password Reset Question and Answer. This can be used at a later time if you forget your password. 8. Enter your e-mail address. You will receive e-mail notification when new information is available in [address] 9. Click Sign Up. You can now view and download portions of your medical record. 10. Click the Download Summary menu link to download a portable copy of your medical information. If you have questions, please visit the Frequently Asked Questions section of the [employer] website.  Remember, [employer] is NOT to be used for urgent needs. For medical emergencies, dial 911. Now available from your iPhone and Android! Please provide this summary of care documentation to your next provider. Your primary care clinician is listed as Rolene Pi. If you have any questions after today's visit, please call [phone].

## 2018-04-03 NOTE — PROGRESS NOTES
This is the Subsequent Medicare Annual Wellness Exam, performed 12 months or more after the Initial AWV or the last Subsequent AWV    I have reviewed the patient's medical history in detail and updated the computerized patient record. History     Past Medical History:   Diagnosis Date    Atrial fibrillation ([address] 12/2/2013    Cardiomegaly     CKD (chronic kidney disease)     COPD     Degenerative spondylolisthesis     Diabetes (HCC)     IDDM    GERD (gastroesophageal reflux disease)     Hyperlipidemia     Hypertension     Hypokalemia     Myalgia     Osteoarthritis of knee     Rheumatoid arthritis(714.0) 12/2/2013    Tachycardia     Venous insufficiency     Vitamin D deficiency       Past Surgical History:   Procedure Laterality Date    HX HYSTERECTOMY      PARTIAL HYSTERECTOMY     Current Outpatient Prescriptions   Medication Sig Dispense Refill    traMADol (ULTRAM) 50 mg tablet Take 1 Tab by mouth every eight (8) hours as needed for Pain. Max Daily Amount: 150 mg. Indications: Pain 90 Tab 0    mometasone (ELOCON) 0.1 % topical cream APPLY TO AFFECTED AREA DAILY 45 g 0    glucose blood VI test strips (BLOOD GLUCOSE TEST) strip DX E11.9  Check blood glucose twice a day and prn (as needed) 100 Strip 11    insulin NPH/insulin regular (NOVOLIN 70/30, HUMULIN 70/30) 100 unit/mL (70-30) injection 6 units AM with breakfast and 10 units with dinner  Indications: type 2 diabetes mellitus, sugar, replaces lantus 10 mL 11    venlafaxine-SR (EFFEXOR-XR) 37.5 mg capsule Take 1 Cap by mouth daily. Indications: fibro, nerves, replaces cymbalta 30 Cap 11    ALPRAZolam (XANAX) 0.25 mg tablet TAKE 1 TABLET BY MOUTH TWICE DAILY AS NEEDED FOR ANXIETY 45 Tab 1    raNITIdine (ZANTAC) 300 mg tablet Take 1 Tab by mouth daily.  Indications: Heartburn 30 Tab 6    warfarin (COUMADIN) 5 mg tablet 1/2 tablet on Tues and Thurs and 1 tablet all other days  Indications: prevent stroke (Patient taking differently: 1/2 tablet on Tues, Thurs & Sun, and 1 tablet all other days  Indications: prevent stroke) 90 Tab 3    atorvastatin (LIPITOR) 20 mg tablet Take 1 Tab by mouth daily. Indications: mixed hyperlipidemia 30 Tab 6    atenolol (TENORMIN) 50 mg tablet Take 1 Tab by mouth daily. Indications: pressure and heart 90 Tab 3    furosemide (LASIX) 40 mg tablet Take 1 Tab by mouth daily. 90 Tab 3    lisinopril (PRINIVIL, ZESTRIL) 20 mg tablet TAKE 1 TABLET BY MOUTH DAILY for pressure 90 Tab 3    Insulin Syringe-Needle U-100 0.3 mL 30 x 1/2\" syrg Use as directed 100 Syringe 3    BD INSULIN SYRINGE ULTRA-FINE 1/2 mL 30 x 1/2\" syrg INJECT INSULIN DAILY 100 Syringe 11    docusate sodium (STOOL SOFTENER) 100 mg capsule Take 100 mg by mouth two (2) times daily as needed for Constipation.  SYRINGE AND NEEDLE,INSULIN,1ML (BD INSULIN SYRINGE) by Does Not Apply route. 6 ml 31G  3/10 ml use as directed      naloxone 4 mg/actuation spry Use 1 spray intranasally into 1 nostril. Use a new Narcan nasal spray for subsequent doses and administer into alternating nostrils. May repeat every 2 to 3 minutes as needed. Indications: OPIATE-INDUCED RESPIRATORY DEPRESSION 1 Package 0    Wheel Chair chelel Man wheelchair. for mobility in the home. Repl prior wheelchair that is falling apart. 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 1 Each 0    amLODIPine (NORVASC) 10 mg tablet Take 1 Tab by mouth daily. Indications: pressure 90 Tab 3    ergocalciferol (VITAMIN D2) 50,000 unit capsule Take 1 Cap by mouth every seven (7) days.  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    Back pain M54.9    Diabetes mellitus ([facility] E11.9    Atrial fibrillation ([facility] I48.91    Hypovitaminosis D E55.9    Gastroesophageal reflux disease without esophagitis K21.9    Encounter for current long-term use of anticoagulants Z79.01    Fibromyalgia M79.7    Essential hypertension I10    Weakness of trunk musculature M62.81    Lethargy R53.83    Right buttock pain M79.1    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) E11.69, E78.2    Long term methotrexate user Z79.899    Chronic narcotic use F11.90    Mixed hyperlipidemia E78.2    Vitamin D deficiency E55.9    Anticoagulated on warfarin Z79.01    Type 2 diabetes mellitus with nephropathy (HCC) E11.21       Depression Risk Factor Screening:     PHQ over the last two weeks 4/3/2018   Little interest or pleasure in doing things Nearly every day   Feeling down, depressed or hopeless Several days   Total Score PHQ 2 4   Trouble falling or staying asleep, or sleeping too much -   Feeling tired or having little energy -   Poor appetite or overeating -   Feeling bad about yourself - or that you are a failure or have let yourself or your family down -   Trouble concentrating on things such as school, work, reading or watching TV -   Moving or speaking so slowly that other people could have noticed; or the opposite being so fidgety that others notice -   Thoughts of being better off dead, or hurting yourself in some way -   PHQ 9 Score -   How difficult have these problems made it for you to do your work, take care of your home and get along with others -     Alcohol Risk Factor Screening: You do not drink alcohol or very rarely. Functional Ability and Level of Safety:   Hearing Loss  Hearing is good.     Activities of Daily Living  The home contains: handrails  Patient needs help with:  transportation, preparing meals, laundry, housework, managing medications, dressing, bathing, hygiene and walking    Fall Risk  Fall Risk Assessment, last 12 mths 4/3/2018   Able to walk? Yes   Fall in past 12 months? No   Fall with injury? -   Number of falls in past 12 months -   Fall Risk Score -       Abuse Screen  Patient is not abused    Cognitive Screening   Evaluation of Cognitive Function:  Has your family/caregiver stated any concerns about your memory: no  Normal    Patient Care Team   Patient Care Team:  Matt Smith NP as PCP - General (Nurse Practitioner)    Assessment/Plan   Education and counseling provided:  Are appropriate based on today's review and evaluation    Diagnoses and all orders for this visit:    1.  Atrial fibrillation, unspecified type ([facility]  -     COLLECTION CAPILLARY BLOOD SPECIMEN  -     AMB POC PT/INR        Health Maintenance Due   Topic Date Due    MICROALBUMIN Q1  01/25/1950    EYE EXAM RETINAL OR DILATED Q1  05/19/2017    MEDICARE YEARLY EXAM  03/28/2018    GLAUCOMA SCREENING Q2Y  05/19/2018     Bart Sandhoff NP-C

## 2018-04-04 NOTE — TELEPHONE ENCOUNTER
Please change  warfarin to 2.5  mg on Tues, , and Thursdays and 5 mg on all the rest of the days.    Magan PEREZ

## 2018-04-04 NOTE — PATIENT INSTRUCTIONS

## 2018-04-04 NOTE — ACP (ADVANCE CARE PLANNING)
Has advanced medical directive scanned into chart. Reviewed at this visit. No changes.   Noe Monzon NP-C

## 2018-04-04 NOTE — PROGRESS NOTES
reviewed as part of the encounter. Discussed with   Both patient and granddaughter that she cannot remain on both tramadol and xanax. Offered palliative medicine referral and several  Options for nonpharmacologic interventions such as TENS unit, pain creams, alternating heat an and ice and lidocaine patches.     Taiwo CARC

## 2018-04-04 NOTE — PROGRESS NOTES
Subjective:     Kevin Almonte is a [de-identified] y.o. 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    Back pain M54.9    Diabetes mellitus ([facility] E11.9    Atrial fibrillation ([facility] I48.91    Hypovitaminosis D E55.9    Gastroesophageal reflux disease without esophagitis K21.9    Encounter for current long-term use of anticoagulants Z79.01    Fibromyalgia M79.7    Essential hypertension I10    Weakness of trunk musculature M62.81    Lethargy R53.83    Right buttock pain M79.1    Combined hyperlipidemia associated with type 2 diabetes mellitus ([facility] E11.69, E78.2    Long term methotrexate user Z79.899    Chronic narcotic use F11.90    Mixed hyperlipidemia E78.2    Vitamin D deficiency E55.9    Anticoagulated on warfarin Z79.01    Type 2 diabetes mellitus with nephropathy (HCC) E11.21         COMPLIANT WITH MEDICATION:   Anticoagulation. Dx:  Atrial fibrillation. Current symptoms: none  Current control agent: B-blocker  Current anticoagulant: warfarinincrease to 5 mg 5 days per week and 2.5 mg 2 days per week  History of bleeding problems: none  Lab Results   Component Value Date/Time    INR CANCELED 12/20/2017 12:44 PM    INR, External 1.8 11/03/2017    INR, External 1.6 12/02/2016    INR, External 3.4/40.8 05/13/2016 11:55 AM    INR POC 1.1 04/03/2018 03:08 PM    INR POC 2.3 12/20/2017 12:46 PM    INR POC 1.8 11/03/2017 02:46 PM    Prothrombin time CANCELED 12/20/2017 12:44 PM   Chronic pain. Diagnosis lumbosacral spondylosis and fibromyalgia  Brief pain inventory: see sheet.   Current short acting medication required Tramadol TID  Basal regimen:  none needed   Neuromodulator  none needed   Antidepressant  none SSRI zoloft prozac celexa lexapro  Elavil SNRI  effexor  Denies Constipation or sedation  granddaughter states she has not taken opoid in 7 days. ROS:  Gen: denies fever, chills, fatigue, weight loss, weight gain  HEENT:denies blurry vision, nasal congestion, sore throat  Resp: denies dypsnea, cough, wheezing  CV: denies chest pain radiating to the jaws or arms, palpitations, lower extremity edema  Abd: denies nausea, vomiting, diarrhea, constipation  Neuro: denies numbness/tingling  Endo: denies polyuria, polydipsia, heat/cold intolerance  Heme: no lymphadenopathy    Allergies   Allergen Reactions    Statins-Hmg-Coa Reductase Inhibitors Myalgia         Current Outpatient Prescriptions:     traMADol (ULTRAM) 50 mg tablet, Take 1 Tab by mouth every eight (8) hours as needed for Pain. Max Daily Amount: 150 mg. Indications: FIBROMYALGIA, Pain, Disp: 90 Tab, Rfl: 0    mometasone (ELOCON) 0.1 % topical cream, APPLY TO AFFECTED AREA DAILY, Disp: 45 g, Rfl: 3    glucose blood VI test strips (BLOOD GLUCOSE TEST) strip, DX E11.9 Check blood glucose twice a day and prn (as needed), Disp: 100 Strip, Rfl: 11    insulin NPH/insulin regular (NOVOLIN 70/30, HUMULIN 70/30) 100 unit/mL (70-30) injection, 6 units AM with breakfast and 10 units with dinner  Indications: type 2 diabetes mellitus, sugar, replaces lantus, Disp: 10 mL, Rfl: 11    venlafaxine-SR (EFFEXOR-XR) 37.5 mg capsule, Take 1 Cap by mouth daily. Indications: fibro, nerves, replaces cymbalta, Disp: 30 Cap, Rfl: 11    raNITIdine (ZANTAC) 300 mg tablet, Take 1 Tab by mouth daily. Indications: Heartburn, Disp: 30 Tab, Rfl: 6    warfarin (COUMADIN) 5 mg tablet, 1/2 tablet on Tues and Thurs and 1 tablet all other days  Indications: prevent stroke (Patient taking differently: 1/2 tablet on Tues, Thurs & Sun, and 1 tablet all other days  Indications: prevent stroke), Disp: 90 Tab, Rfl: 3    atorvastatin (LIPITOR) 20 mg tablet, Take 1 Tab by mouth daily.  Indications: mixed hyperlipidemia, Disp: 30 Tab, Rfl: 6    atenolol (TENORMIN) 50 mg tablet, Take 1 Tab by mouth daily. Indications: pressure and heart, Disp: 90 Tab, Rfl: 3    furosemide (LASIX) 40 mg tablet, Take 1 Tab by mouth daily. , Disp: 90 Tab, Rfl: 3    lisinopril (PRINIVIL, ZESTRIL) 20 mg tablet, TAKE 1 TABLET BY MOUTH DAILY for pressure, Disp: 90 Tab, Rfl: 3    Insulin Syringe-Needle U-100 0.3 mL 30 x 1/2\" syrg, Use as directed, Disp: 100 Syringe, Rfl: 3    BD INSULIN SYRINGE ULTRA-FINE 1/2 mL 30 x 1/2\" syrg, INJECT INSULIN DAILY, Disp: 100 Syringe, Rfl: 11    docusate sodium (STOOL SOFTENER) 100 mg capsule, Take 100 mg by mouth two (2) times daily as needed for Constipation. , Disp: , Rfl:     SYRINGE AND NEEDLE,INSULIN,1ML (BD INSULIN SYRINGE), by Does Not Apply route. 6 ml 31G  3/10 ml use as directed, Disp: , Rfl:     naloxone 4 mg/actuation spry, Use 1 spray intranasally into 1 nostril. Use a new Narcan nasal spray for subsequent doses and administer into alternating nostrils. May repeat every 2 to 3 minutes as needed. Indications: OPIATE-INDUCED RESPIRATORY DEPRESSION, Disp: 1 Package, Rfl: 0    Wheel Chair chelle, Man wheelchair. for mobility in the home. Repl prior wheelchair that is falling apart. Dx M06.9 and M62.81 Rheum arthritis and weak trunk mm., Disp: 1 Each, Rfl: 0    amLODIPine (NORVASC) 10 mg tablet, Take 1 Tab by mouth daily. Indications: pressure, Disp: 90 Tab, Rfl: 3    ergocalciferol (VITAMIN D2) 50,000 unit capsule, Take 1 Cap by mouth every seven (7) days. , Disp: 4 Cap, Rfl: 3    Past Medical History:   Diagnosis Date    Atrial fibrillation ([address] 12/2/2013    Cardiomegaly     CKD (chronic kidney disease)     COPD     Degenerative spondylolisthesis     Diabetes (HCC)     IDDM    GERD (gastroesophageal reflux disease)     Hyperlipidemia     Hypertension     Hypokalemia     Myalgia     Osteoarthritis of knee     Rheumatoid arthritis(714.0) 12/2/2013    Tachycardia     Venous insufficiency     Vitamin D deficiency        Past Surgical History:   Procedure Laterality Date    HX HYSTERECTOMY      PARTIAL HYSTERECTOMY       History   Smoking Status    Never Smoker   Smokeless Tobacco    Never Used       Social History     Social History    Marital status:      Spouse name: N/A    Number of children: N/A    Years of education: N/A     Social History Main Topics    Smoking status: Never Smoker    Smokeless tobacco: Never Used    Alcohol use No    Drug use: No    Sexual activity: Not Currently     Partners: Male     Birth control/ protection: None     Other Topics Concern     Service No    Blood Transfusions No    Caffeine Concern Yes    Occupational Exposure No    Hobby Hazards No    Sleep Concern No    Stress Concern No    Weight Concern Yes     losing weight    Special Diet No    Back Care Yes     pain    Exercise No    Bike Helmet No     n/a    Seat Belt No    Self-Exams No     Social History Narrative       Family History   Problem Relation Age of Onset    Diabetes Father     Diabetes Sister     Heart Disease Brother      ENLARGED HEART    Elevated Lipids Brother     Diabetes Sister          Objective:     Visit Vitals    /80 (BP 1 Location: Left arm, BP Patient Position: Sitting)    Pulse (!) 51    Temp 97.3 °F (36.3 °C) (Temporal)    Resp 16    Ht 5' 1.5\" (1.562 m)    Wt 179 lb (81.2 kg)    SpO2 97%    BMI 33.27 kg/m2     Body mass index is 33.27 kg/(m^2). General: Alert and oriented. No acute distress. Well nourished. No tenderness exhibited throughout the exam  HEENT :  Ears:TMs are normal. Canals are clear. Eyes: pupils equal, round, react to light and accommodation. Extra ocular movements intact. Nose: patent. Mouth and throat is clear. Neck:supple full range of motion no thyromegaly. Trachea midline, No carotid bruits. No significant lymphadenopathy  Lungs[de-identified] clear to auscultation without wheezes, rales, or rhonchi. Heart :RRR, S1 & S2 are normal intensity.  No murmur; no gallop  Abdomen: bowel sounds active. No tenderness, guarding, rebound, masses, hepatic or spleen enlargement  Back: no CVA tenderness. Extremities: without clubbing, cyanosis, or edema  Pulses: radial and femoral pulses are normal  Neuro: HMF intact. Cranial nerves II through XII grossly normal.  Motor: is 5 over 5 and symmetrical.   Deep tendon reflexes: +2 equal    Results for orders placed or performed in visit on 04/03/18   AMB POC PT/INR   Result Value Ref Range    VALID INTERNAL CONTROL POC Yes     Prothrombin time (POC) 12.8 seconds    INR POC 1.1    AMB POC URINE, MICROALBUMIN, SEMIQUANT (3 RESULTS)   Result Value Ref Range    ALBUMIN, URINE POC 80 Negative mg/L    CREATININE, URINE  mg/dL    Microalbumin/creat ratio (POC)  <30 MG/G       Results for orders placed or performed in visit on 04/03/18   AMB POC PT/INR   Result Value Ref Range    VALID INTERNAL CONTROL POC Yes     Prothrombin time (POC) 12.8 seconds    INR POC 1.1    AMB POC URINE, MICROALBUMIN, SEMIQUANT (3 RESULTS)   Result Value Ref Range    ALBUMIN, URINE POC 80 Negative mg/L    CREATININE, URINE  mg/dL    Microalbumin/creat ratio (POC)  <30 MG/G       Assessment/ Plan:     1. Atrial fibrillation, unspecified type (HCC)  Increase coumadin to 5 mg 5 days per week. - COLLECTION CAPILLARY BLOOD SPECIMEN  - AMB POC PT/INR    2. Type 2 diabetes mellitus with nephropathy (HCC)  Continue current medical plan  - AMB POC URINE, MICROALBUMIN, SEMIQUANT (3 RESULTS)    3. Fibromyalgia    - traMADol (ULTRAM) 50 mg tablet; Take 1 Tab by mouth every eight (8) hours as needed for Pain. Max Daily Amount: 150 mg. Indications: FIBROMYALGIA, Pain  Dispense: 90 Tab; Refill: 0  - REFERRAL TO PALLIATIVE MEDICINE  Will no longer prescribe xanax and ultram. Required to follow up with palliative care to address and assist with treatment plan  4. Chronic midline low back pain without sciatica    - traMADol (ULTRAM) 50 mg tablet;  Take 1 Tab by mouth every eight (8) hours as needed for Pain. Max Daily Amount: 150 mg. Indications: FIBROMYALGIA, Pain  Dispense: 90 Tab; Refill: 0  - REFERRAL TO PALLIATIVE MEDICINE      Orders Placed This Encounter    COLLECTION CAPILLARY BLOOD SPECIMEN    REFERRAL TO PALLIATIVE MEDICINE     Referral Priority:   Routine     Referral Type:   Consultation     Referral Reason:   Specialty Services Required     Referred to Provider:   Josephine Lovelace MD     Requested Specialty:   Palliative Medicine    AMB POC PT/INR    AMB POC URINE, MICROALBUMIN, SEMIQUANT (3 RESULTS)    traMADol (ULTRAM) 50 mg tablet     Sig: Take 1 Tab by mouth every eight (8) hours as needed for Pain. Max Daily Amount: 150 mg. Indications: FIBROMYALGIA, Pain     Dispense:  90 Tab     Refill:  0     Do Not Fill Until 1-29-18.  mometasone (ELOCON) 0.1 % topical cream     Sig: APPLY TO AFFECTED AREA DAILY     Dispense:  45 g     Refill:  3         Verbal and written instructions (see AVS) provided.  Patient expresses understanding of diagnosis and treatment plan. Health Maintenance Due   Topic Date Due    MICROALBUMIN Q1  01/25/1950    EYE EXAM RETINAL OR DILATED Q1  05/19/2017    MEDICARE YEARLY EXAM  03/28/2018    GLAUCOMA SCREENING Q2Y  05/19/2018         Follow-up Disposition:  Return in about 1 week (around 4/10/2018).   For repeat PT and INR    MICKIE León

## 2018-04-06 ENCOUNTER — NURSE NAVIGATOR (OUTPATIENT)
Dept: PALLATIVE CARE | Age: 78
End: 2018-04-06

## 2018-04-10 ENCOUNTER — TELEPHONE (OUTPATIENT)
Dept: FAMILY MEDICINE CLINIC | Age: 78
End: 2018-04-10

## 2018-04-10 ENCOUNTER — OFFICE VISIT (OUTPATIENT)
Dept: FAMILY MEDICINE CLINIC | Age: 78
End: 2018-04-10

## 2018-04-10 VITALS
DIASTOLIC BLOOD PRESSURE: 80 MMHG | SYSTOLIC BLOOD PRESSURE: 130 MMHG | RESPIRATION RATE: 18 BRPM | TEMPERATURE: 97.8 F | HEART RATE: 66 BPM

## 2018-04-10 DIAGNOSIS — Z79.01 ANTICOAGULATED ON WARFARIN: Primary | ICD-10-CM

## 2018-04-10 DIAGNOSIS — I48.91 ATRIAL FIBRILLATION, UNSPECIFIED TYPE (HCC): ICD-10-CM

## 2018-04-10 LAB
INR BLD: 1.3
PT POC: 15.6 SECONDS
VALID INTERNAL CONTROL?: YES

## 2018-04-10 NOTE — TELEPHONE ENCOUNTER
Called Isis Sheldon and explained she needs an appointment today to check her INR.  She will be able to get her here at 4:00 pm

## 2018-04-10 NOTE — TELEPHONE ENCOUNTER
Donya Morales called for Mrs. Dion Carnes wondering if she could reschedule her appt today to a different day for a nurse visit for blood work only. I see on the schedule that Bettina Collins wants to see her for an OV as well. I told Donya Morales I would ask about this situation and call her back to schedule Mrs. Dion Carnes.  [phone]

## 2018-04-15 NOTE — PROGRESS NOTES
Subjective:     Iva Del Toro is a [de-identified] y.o. female who presents today with the following:  Chief Complaint   Patient presents with    Anticoagulation       Patient Active Problem List   Diagnosis Code    Rheumatoid arthritis ([facility] M06.9    Lumbosacral spondylosis M47.817    Sacroiliac joint disease M53.3    Back pain M54.9    Diabetes mellitus ([facility] E11.9    Atrial fibrillation ([facility] I48.91    Hypovitaminosis D E55.9    Gastroesophageal reflux disease without esophagitis K21.9    Encounter for current long-term use of anticoagulants Z79.01    Fibromyalgia M79.7    Essential hypertension I10    Weakness of trunk musculature M62.81    Lethargy R53.83    Right buttock pain M79.1    Combined hyperlipidemia associated with type 2 diabetes mellitus ([facility] E11.69, E78.2    Long term methotrexate user Z79.899    Chronic narcotic use F11.90    Mixed hyperlipidemia E78.2    Vitamin D deficiency E55.9    Anticoagulated on warfarin Z79.01    Type 2 diabetes mellitus with nephropathy ([facility] E11.21         Discussed importance of advanced medical directives with patient. Patient is capable of making decisions. Anticoagulation. Dx:  Atrial fibrillation.  Current symptoms: none  Current control agent: none  Current anticoagulant: warfarintake as directed  History of bleeding problems: none  Lab Results   Component Value Date/Time    INR CANCELED 12/20/2017 12:44 PM    INR, External 1.8 11/03/2017    INR, External 1.6 12/02/2016    INR, External 3.4/40.8 05/13/2016 11:55 AM    INR POC 1.3 04/10/2018 04:35 PM    INR POC 1.1 04/03/2018 03:08 PM    INR POC 2.3 12/20/2017 12:46 PM    Prothrombin time CANCELED 12/20/2017 12:44 PM         ROS:  Gen: denies fever, chills, fatigue, weight loss, weight gain  HEENT:denies blurry vision, nasal congestion, sore throat  Resp: denies dypsnea, cough, wheezing  CV: denies chest pain radiating to the jaws or arms, palpitations, lower extremity edema  Abd: denies nausea, vomiting, diarrhea, constipation  Neuro: denies numbness/tingling  Endo: denies polyuria, polydipsia, heat/cold intolerance  Heme: no lymphadenopathy    Allergies   Allergen Reactions    Statins-Hmg-Coa Reductase Inhibitors Myalgia         Current Outpatient Prescriptions:     traMADol (ULTRAM) 50 mg tablet, Take 1 Tab by mouth every eight (8) hours as needed for Pain. Max Daily Amount: 150 mg. Indications: FIBROMYALGIA, Pain, Disp: 90 Tab, Rfl: 0    mometasone (ELOCON) 0.1 % topical cream, APPLY TO AFFECTED AREA DAILY, Disp: 45 g, Rfl: 3    glucose blood VI test strips (BLOOD GLUCOSE TEST) strip, DX E11.9 Check blood glucose twice a day and prn (as needed), Disp: 100 Strip, Rfl: 11    insulin NPH/insulin regular (NOVOLIN 70/30, HUMULIN 70/30) 100 unit/mL (70-30) injection, 6 units AM with breakfast and 10 units with dinner  Indications: type 2 diabetes mellitus, sugar, replaces lantus, Disp: 10 mL, Rfl: 11    SYRINGE AND NEEDLE,INSULIN,1ML (BD INSULIN SYRINGE), by Does Not Apply route. 6 ml 31G  3/10 ml use as directed, Disp: , Rfl:     venlafaxine-SR (EFFEXOR-XR) 37.5 mg capsule, Take 1 Cap by mouth daily. Indications: fibro, nerves, replaces cymbalta, Disp: 30 Cap, Rfl: 11    raNITIdine (ZANTAC) 300 mg tablet, Take 1 Tab by mouth daily. Indications: Heartburn, Disp: 30 Tab, Rfl: 6    warfarin (COUMADIN) 5 mg tablet, 1/2 tablet on Tues and Thurs and 1 tablet all other days  Indications: prevent stroke (Patient taking differently: 1/2 tablet on Tues, Thurs & Sun, and 1 tablet all other days  Indications: prevent stroke), Disp: 90 Tab, Rfl: 3    atorvastatin (LIPITOR) 20 mg tablet, Take 1 Tab by mouth daily. Indications: mixed hyperlipidemia, Disp: 30 Tab, Rfl: 6    naloxone 4 mg/actuation spry, Use 1 spray intranasally into 1 nostril. Use a new Narcan nasal spray for subsequent doses and administer into alternating nostrils. May repeat every 2 to 3 minutes as needed.   Indications: OPIATE-INDUCED RESPIRATORY DEPRESSION, Disp: 1 Package, Rfl: 0    atenolol (TENORMIN) 50 mg tablet, Take 1 Tab by mouth daily. Indications: pressure and heart, Disp: 90 Tab, Rfl: 3    Wheel Chair chelle, Man wheelchair. for mobility in the home. Repl prior wheelchair that is falling apart. Dx M06.9 and M62.81 Rheum arthritis and weak trunk mm., Disp: 1 Each, Rfl: 0    amLODIPine (NORVASC) 10 mg tablet, Take 1 Tab by mouth daily. Indications: pressure, Disp: 90 Tab, Rfl: 3    furosemide (LASIX) 40 mg tablet, Take 1 Tab by mouth daily. , Disp: 90 Tab, Rfl: 3    lisinopril (PRINIVIL, ZESTRIL) 20 mg tablet, TAKE 1 TABLET BY MOUTH DAILY for pressure, Disp: 90 Tab, Rfl: 3    ergocalciferol (VITAMIN D2) 50,000 unit capsule, Take 1 Cap by mouth every seven (7) days. , Disp: 4 Cap, Rfl: 3    Insulin Syringe-Needle U-100 0.3 mL 30 x 1/2\" syrg, Use as directed, Disp: 100 Syringe, Rfl: 3    BD INSULIN SYRINGE ULTRA-FINE 1/2 mL 30 x 1/2\" syrg, INJECT INSULIN DAILY, Disp: 100 Syringe, Rfl: 11    docusate sodium (STOOL SOFTENER) 100 mg capsule, Take 100 mg by mouth two (2) times daily as needed for Constipation. , Disp: , Rfl:     Past Medical History:   Diagnosis Date    Atrial fibrillation ([address] 12/2/2013    Cardiomegaly     CKD (chronic kidney disease)     COPD     Degenerative spondylolisthesis     Diabetes (HCC)     IDDM    GERD (gastroesophageal reflux disease)     Hyperlipidemia     Hypertension     Hypokalemia     Myalgia     Osteoarthritis of knee     Rheumatoid arthritis(714.0) 12/2/2013    Tachycardia     Venous insufficiency     Vitamin D deficiency        Past Surgical History:   Procedure Laterality Date    HX HYSTERECTOMY      PARTIAL HYSTERECTOMY       History   Smoking Status    Never Smoker   Smokeless Tobacco    Never Used       Social History     Social History    Marital status:      Spouse name: N/A    Number of children: N/A    Years of education: N/A     Social History Main Topics    Smoking status: Never Smoker    Smokeless tobacco: Never Used    Alcohol use No    Drug use: No    Sexual activity: Not Currently     Partners: Male     Birth control/ protection: None     Other Topics Concern     Service No    Blood Transfusions No    Caffeine Concern Yes    Occupational Exposure No    Hobby Hazards No    Sleep Concern No    Stress Concern No    Weight Concern Yes     losing weight    Special Diet No    Back Care Yes     pain    Exercise No    Bike Helmet No     n/a    Seat Belt No    Self-Exams No     Social History Narrative       Family History   Problem Relation Age of Onset    Diabetes Father     Diabetes Sister     Heart Disease Brother      ENLARGED HEART    Elevated Lipids Brother     Diabetes Sister          Objective:     Visit Vitals    /80 (BP 1 Location: Left arm, BP Patient Position: Sitting)    Pulse 66    Temp 97.8 °F (36.6 °C) (Temporal)    Resp 18    Ht (P) 5' 1.5\" (1.562 m)     There is no height or weight on file to calculate BMI. General: Alert and oriented. No acute distress. Well nourished  HEENT :  Ears:TMs are normal. Canals are clear. Eyes: pupils equal, round, react to light and accommodation. Extra ocular movements intact. Nose: patent. Mouth and throat is clear. Neck:supple full range of motion no thyromegaly. Trachea midline, No carotid bruits. No significant lymphadenopathy  Lungs[de-identified] clear to auscultation without wheezes, rales, or rhonchi. Heart :RRR, S1 & S2 are normal intensity. No murmur; no gallop  Abdomen: bowel sounds active. No tenderness, guarding, rebound, masses, hepatic or spleen enlargement  Back: no CVA tenderness. Extremities: without clubbing, cyanosis, or edema  Pulses: radial and femoral pulses are normal  Neuro: HMF intact.  Cranial nerves II through XII grossly normal.  Motor: is 5 over 5 and symmetrical.   Deep tendon reflexes: +2 equal    Results for orders placed or performed in visit on 04/10/18 AMB POC PT/INR   Result Value Ref Range    VALID INTERNAL CONTROL POC Yes     Prothrombin time (POC) 15.6 seconds    INR POC 1.3        Results for orders placed or performed in visit on 04/10/18   AMB POC PT/INR   Result Value Ref Range    VALID INTERNAL CONTROL POC Yes     Prothrombin time (POC) 15.6 seconds    INR POC 1.3        Assessment/ Plan:     1. Anticoagulated on warfarin    - COLLECTION CAPILLARY BLOOD SPECIMEN  - AMB POC PT/INR    2. Atrial fibrillation, unspecified type ([address]    - COLLECTION CAPILLARY BLOOD SPECIMEN  - AMB POC PT/INR      Orders Placed This Encounter    COLLECTION CAPILLARY BLOOD SPECIMEN    AMB POC PT/INR         Verbal and written instructions (see AVS) provided.  Patient expresses understanding of diagnosis and treatment plan. Health Maintenance Due   Topic Date Due    EYE EXAM RETINAL OR DILATED Q1  05/19/2017    GLAUCOMA SCREENING Q2Y  05/19/2018         Follow-up Disposition:  Return in about 1 week (around 4/17/2018).       Brandon Franz, GEETA-C

## 2018-04-18 ENCOUNTER — OFFICE VISIT (OUTPATIENT)
Dept: FAMILY MEDICINE CLINIC | Age: 78
End: 2018-04-18

## 2018-04-18 VITALS
OXYGEN SATURATION: 99 % | HEIGHT: 62 IN | TEMPERATURE: 97.4 F | HEART RATE: 78 BPM | SYSTOLIC BLOOD PRESSURE: 140 MMHG | RESPIRATION RATE: 20 BRPM | DIASTOLIC BLOOD PRESSURE: 74 MMHG

## 2018-04-18 DIAGNOSIS — G89.29 CHRONIC MIDLINE LOW BACK PAIN WITHOUT SCIATICA: ICD-10-CM

## 2018-04-18 DIAGNOSIS — I48.91 ATRIAL FIBRILLATION, UNSPECIFIED TYPE (HCC): ICD-10-CM

## 2018-04-18 DIAGNOSIS — E78.2 COMBINED HYPERLIPIDEMIA ASSOCIATED WITH TYPE 2 DIABETES MELLITUS (HCC): ICD-10-CM

## 2018-04-18 DIAGNOSIS — E11.21 TYPE 2 DIABETES MELLITUS WITH NEPHROPATHY (HCC): ICD-10-CM

## 2018-04-18 DIAGNOSIS — M79.7 FIBROMYALGIA: ICD-10-CM

## 2018-04-18 DIAGNOSIS — Z00.00 ENCOUNTER FOR MEDICARE ANNUAL WELLNESS EXAM: ICD-10-CM

## 2018-04-18 DIAGNOSIS — E11.69 COMBINED HYPERLIPIDEMIA ASSOCIATED WITH TYPE 2 DIABETES MELLITUS (HCC): ICD-10-CM

## 2018-04-18 DIAGNOSIS — M54.50 CHRONIC MIDLINE LOW BACK PAIN WITHOUT SCIATICA: ICD-10-CM

## 2018-04-18 DIAGNOSIS — I10 ESSENTIAL HYPERTENSION: Primary | ICD-10-CM

## 2018-04-18 LAB
INR BLD: 1.7
PT POC: 20.6 SECONDS
VALID INTERNAL CONTROL?: YES

## 2018-04-18 NOTE — MR AVS SNAPSHOT
[address] [address] [address] 
[phone] Patient: Iva Del Toro MRN: [id number] RNP:[id number] Visit Information Date & Time Provider Department Dept. Phone Encounter #  
 4/18/2018 11:00 AM Vimal Evans NP [facility] [address] [facility] [phone] [id number] Upcoming Health Maintenance Date Due  
 EYE EXAM RETINAL OR DILATED Q1 5/19/2017 GLAUCOMA SCREENING Q2Y 5/19/2018 FOOT EXAM Q1 5/22/2018 LIPID PANEL Q1 5/22/2018 HEMOGLOBIN A1C Q6M 6/20/2018 Pneumococcal 65+ Low/Medium Risk (2 of 2 - PPSV23) 12/20/2018 MICROALBUMIN Q1 4/3/2019 MEDICARE YEARLY EXAM 4/4/2019 DTaP/Tdap/Td series (2 - Td) 3/7/2027 Allergies as of 4/18/2018  Review Complete On: 4/18/2018 By: Neptali Soto RN Severity Noted Reaction Type Reactions Statins-hmg-coa Reductase Inhibitors  10/19/2015    Myalgia Current Immunizations  Never Reviewed No immunizations on file. Not reviewed this visit You Were Diagnosed With   
  
 Codes Comments Essential hypertension    -  Primary ICD-10-CM: I10 
ICD-9-CM: 401.9 Atrial fibrillation, unspecified type ([facility]     ICD-10-CM: I48.91 
ICD-9-CM: 427.31 Type 2 diabetes mellitus with nephropathy (HCC)     ICD-10-CM: E11.21 
ICD-9-CM: 250.40, 583.81 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)     ICD-10-CM: E11.69, W86.9 ICD-9-CM: 250.80, 272.2 Vitals BP Pulse Temp Resp Height(growth percentile) SpO2  
 140/74 (BP 1 Location: Left arm, BP Patient Position: Sitting) 78 97.4 °F (36.3 °C) (Temporal) 20 5' 1.5\" (1.562 m) 99% OB Status Smoking Status Postmenopausal Never Smoker Preferred Pharmacy Pharmacy Name Phone [address], [address] [employer] [address] [phone] Your Updated Medication List  
  
   
 This list is accurate as of 4/18/18 11:43 AM.  Always use your most recent med list. amLODIPine 10 mg tablet Commonly known as:  Melanie Angus Take 1 Tab by mouth daily. Indications: pressure  
  
 atenolol 50 mg tablet Commonly known as:  TENORMIN Take 1 Tab by mouth daily. Indications: pressure and heart  
  
 atorvastatin 20 mg tablet Commonly known as:  LIPITOR Take 1 Tab by mouth daily. Indications: mixed hyperlipidemia BD INSULIN SYRINGE  
by Does Not Apply route. 6 ml 31G  3/10 ml use as directed BD INSULIN SYRINGE ULTRA-FINE 1/2 mL 30 gauge x 1/2\" Syrg Generic drug:  Insulin Syringe-Needle U-100 INJECT INSULIN DAILY  
  
 ergocalciferol 50,000 unit capsule Commonly known as:  VITAMIN D2 Take 1 Cap by mouth every seven (7) days. furosemide 40 mg tablet Commonly known as:  LASIX Take 1 Tab by mouth daily. glucose blood VI test strips strip Commonly known as:  blood glucose test  
DX E11.9 Check blood glucose twice a day and prn (as needed)  
  
 insulin NPH/insulin regular 100 unit/mL (70-30) injection Commonly known as:  NOVOLIN 70/30, HUMULIN 70/30  
6 units AM with breakfast and 10 units with dinner  Indications: type 2 diabetes mellitus, sugar, replaces lantus Insulin Syringe-Needle U-100 0.3 mL 30 gauge x 1/2\" Syrg Use as directed  
  
 lisinopril 20 mg tablet Commonly known as:  PRINIVIL, ZESTRIL  
TAKE 1 TABLET BY MOUTH DAILY for pressure  
  
 mometasone 0.1 % topical cream  
Commonly known as:  ELOCON  
APPLY TO AFFECTED AREA DAILY  
  
 naloxone 4 mg/actuation nasal spray Commonly known as:  [employer] Use 1 spray intranasally into 1 nostril. Use a new Narcan nasal spray for subsequent doses and administer into alternating nostrils. May repeat every 2 to 3 minutes as needed. Indications: OPIATE-INDUCED RESPIRATORY DEPRESSION  
  
 raNITIdine 300 mg tablet Commonly known as:  ZANTAC Take 1 Tab by mouth daily. Indications: Heartburn STOOL SOFTENER 100 mg capsule Generic drug:  docusate sodium Take 100 mg by mouth two (2) times daily as needed for Constipation. traMADol 50 mg tablet Commonly known as:  ULTRAM  
Take 1 Tab by mouth every eight (8) hours as needed for Pain. Max Daily Amount: 150 mg. Indications: FIBROMYALGIA, Pain  
  
 venlafaxine-SR 37.5 mg capsule Commonly known as:  EFFEXOR-XR Take 1 Cap by mouth daily. Indications: fibro, nerves, replaces cymbalta  
  
 warfarin 5 mg tablet Commonly known as:  COUMADIN  
1/2 tablet on Tues and Thurs and 1 tablet all other days  Indications: prevent stroke [address] [facility] wheelchair. for mobility in the home. Repl prior wheelchair that is falling apart. 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 We Performed the Following AMB POC PT/INR [38887 CPT(R)] CBC WITH AUTOMATED DIFF [58678 CPT(R)] COLLECTION CAPILLARY BLOOD SPECIMEN [08574 CPT(R)] COLLECTION VENOUS BLOOD,VENIPUNCTURE [id number] CPT(R)] METABOLIC PANEL, COMPREHENSIVE [67632 CPT(R)] Introducing [facility]! Jasbir Byers introduces [employer] patient portal. Now you can access parts of your medical record, email your doctor's office, and request medication refills online. 1. In your internet browser, go to [email or web address] 2. Click on the First Time User? Click Here link in the Sign In box. You will see the New Member Sign Up page. 3. Enter your Collective Access Code exactly as it appears below. You will not need to use this code after youve completed the sign-up process. If you do not sign up before the expiration date, you must request a new code. · [employer] Access Code: [email or web address] Expires: 7/2/2018  4:51 PM 
 
4. Enter the last four digits of your Social Security Number (xxxx) and Date of Birth (mm/dd/yyyy) as indicated and click Submit.  You will be taken to the next sign-up page. 5. Create a [employer] ID. This will be your [employer] login ID and cannot be changed, so think of one that is secure and easy to remember. 6. Create a [employer] password. You can change your password at any time. 7. Enter your Password Reset Question and Answer. This can be used at a later time if you forget your password. 8. Enter your e-mail address. You will receive e-mail notification when new information is available in [address]. 9. Click Sign Up. You can now view and download portions of your medical record. 10. Click the Download Summary menu link to download a portable copy of your medical information. If you have questions, please visit the Frequently Asked Questions section of the [employer] website. Remember, [employer] is NOT to be used for urgent needs. For medical emergencies, dial 911. Now available from your iPhone and Android! Please provide this summary of care documentation to your next provider. Your primary care clinician is listed as Dixon Palomares. If you have any questions after today's visit, please call [phone].

## 2018-04-18 NOTE — PROGRESS NOTES
1. Have you been to the ER, urgent care clinic since your last visit? Hospitalized since your last visit? No    2. Have you seen or consulted any other health care providers outside of the [address] [place] [address] since your last visit? Include any pap smears or colon screening.  No

## 2018-04-19 LAB
ALBUMIN SERPL-MCNC: 3.7 G/DL (ref 3.5–4.7)
ALBUMIN/GLOB SERPL: 0.9 {RATIO} (ref 1.2–2.2)
ALP SERPL-CCNC: 69 IU/L (ref 39–117)
ALT SERPL-CCNC: 10 IU/L (ref 0–32)
AST SERPL-CCNC: 18 IU/L (ref 0–40)
BASOPHILS # BLD AUTO: 0 X10E3/UL (ref 0–0.2)
BASOPHILS NFR BLD AUTO: 1 %
BILIRUB SERPL-MCNC: 0.4 MG/DL (ref 0–1.2)
BUN SERPL-MCNC: 15 MG/DL (ref 8–27)
BUN/CREAT SERPL: 15 (ref 12–28)
CALCIUM SERPL-MCNC: 8.7 MG/DL (ref 8.7–10.3)
CHLORIDE SERPL-SCNC: 96 MMOL/L (ref 96–106)
CO2 SERPL-SCNC: 21 MMOL/L (ref 18–29)
CREAT SERPL-MCNC: 1.02 MG/DL (ref 0.57–1)
EOSINOPHIL # BLD AUTO: 0.1 X10E3/UL (ref 0–0.4)
EOSINOPHIL NFR BLD AUTO: 1 %
ERYTHROCYTE [DISTWIDTH] IN BLOOD BY AUTOMATED COUNT: 13.8 % (ref 12.3–15.4)
GFR SERPLBLD CREATININE-BSD FMLA CKD-EPI: 52 ML/MIN/1.73
GFR SERPLBLD CREATININE-BSD FMLA CKD-EPI: 60 ML/MIN/1.73
GLOBULIN SER CALC-MCNC: 3.9 G/DL (ref 1.5–4.5)
GLUCOSE SERPL-MCNC: 338 MG/DL (ref 65–99)
HCT VFR BLD AUTO: 33.8 % (ref 34–46.6)
HGB BLD-MCNC: 10.8 G/DL (ref 11.1–15.9)
IMM GRANULOCYTES # BLD: 0 X10E3/UL (ref 0–0.1)
IMM GRANULOCYTES NFR BLD: 0 %
INTERPRETATION: NORMAL
LYMPHOCYTES # BLD AUTO: 2.1 X10E3/UL (ref 0.7–3.1)
LYMPHOCYTES NFR BLD AUTO: 35 %
MCH RBC QN AUTO: 28.9 PG (ref 26.6–33)
MCHC RBC AUTO-ENTMCNC: 32 G/DL (ref 31.5–35.7)
MCV RBC AUTO: 90 FL (ref 79–97)
MONOCYTES # BLD AUTO: 0.6 X10E3/UL (ref 0.1–0.9)
MONOCYTES NFR BLD AUTO: 9 %
NEUTROPHILS # BLD AUTO: 3.3 X10E3/UL (ref 1.4–7)
NEUTROPHILS NFR BLD AUTO: 54 %
PLATELET # BLD AUTO: 262 X10E3/UL (ref 150–379)
POTASSIUM SERPL-SCNC: 4.3 MMOL/L (ref 3.5–5.2)
PROT SERPL-MCNC: 7.6 G/DL (ref 6–8.5)
RBC # BLD AUTO: 3.74 X10E6/UL (ref 3.77–5.28)
SODIUM SERPL-SCNC: 136 MMOL/L (ref 134–144)
WBC # BLD AUTO: 6.1 X10E3/UL (ref 3.4–10.8)

## 2018-04-20 NOTE — PROGRESS NOTES
Subjective:     Read Paget is a [de-identified] y.o. female who presents with her grand daughter today with the following:  Chief Complaint   Patient presents with    Anticoagulation    Hypertension     checkup       Patient Active Problem List   Diagnosis Code    Rheumatoid arthritis ([facility] M06.9    Lumbosacral spondylosis M47.817    Sacroiliac joint disease M53.3    Back pain M54.9    Diabetes mellitus ([facility] E11.9    Atrial fibrillation ([facility] I48.91    Hypovitaminosis D E55.9    Gastroesophageal reflux disease without esophagitis K21.9    Encounter for current long-term use of anticoagulants Z79.01    Fibromyalgia M79.7    Essential hypertension I10    Weakness of trunk musculature M62.81    Lethargy R53.83    Right buttock pain M79.1    Combined hyperlipidemia associated with type 2 diabetes mellitus ([facility] E11.69, E78.2    Long term methotrexate user Z79.899    Chronic narcotic use F11.90    Mixed hyperlipidemia E78.2    Vitamin D deficiency E55.9    Anticoagulated on warfarin Z79.01    Type 2 diabetes mellitus with nephropathy (HCC) E11.21         COMPLIANT WITH MEDICATION:   HTN; Denies chest pain, dyspnea, palpitations, headache and blurred vision. Blood pressure normotensive. Anticoagulation. Dx:  Atrial fibrillation.    Current symptoms: none  Current control agent: B-blocker  Current anticoagulant: warfarin5 mg every day except Tuesday 2.5 mg  History of bleeding problems: none  Lab Results   Component Value Date/Time    INR CANCELED 12/20/2017 12:44 PM    INR, External 1.8 11/03/2017    INR, External 1.6 12/02/2016    INR, External 3.4/40.8 05/13/2016 11:55 AM    INR POC 1.7 04/18/2018 11:15 AM    INR POC 1.3 04/10/2018 04:35 PM    INR POC 1.1 04/03/2018 03:08 PM    Prothrombin time CANCELED 12/20/2017 12:44 PM     Sore tongue: Burnt tongue with hot breakfast cereal,    ROS:  Gen: denies fever, chills, fatigue, weight loss, weight gain  HEENT:denies blurry vision, nasal congestion, sore throat  Resp: denies dypsnea, cough, wheezing  CV: denies chest pain radiating to the jaws or arms, palpitations, lower extremity edema  Abd: denies nausea, vomiting, diarrhea, constipation  Neuro: denies numbness/tingling  Endo: denies polyuria, polydipsia, heat/cold intolerance  Heme: no lymphadenopathy    Allergies   Allergen Reactions    Statins-Hmg-Coa Reductase Inhibitors Myalgia         Current Outpatient Prescriptions:     traMADol (ULTRAM) 50 mg tablet, Take 1 Tab by mouth every eight (8) hours as needed for Pain. Max Daily Amount: 150 mg. Indications: FIBROMYALGIA, Pain, Disp: 90 Tab, Rfl: 0    mometasone (ELOCON) 0.1 % topical cream, APPLY TO AFFECTED AREA DAILY, Disp: 45 g, Rfl: 3    glucose blood VI test strips (BLOOD GLUCOSE TEST) strip, DX E11.9 Check blood glucose twice a day and prn (as needed), Disp: 100 Strip, Rfl: 11    insulin NPH/insulin regular (NOVOLIN 70/30, HUMULIN 70/30) 100 unit/mL (70-30) injection, 6 units AM with breakfast and 10 units with dinner  Indications: type 2 diabetes mellitus, sugar, replaces lantus, Disp: 10 mL, Rfl: 11    SYRINGE AND NEEDLE,INSULIN,1ML (BD INSULIN SYRINGE), by Does Not Apply route. 6 ml 31G  3/10 ml use as directed, Disp: , Rfl:     venlafaxine-SR (EFFEXOR-XR) 37.5 mg capsule, Take 1 Cap by mouth daily. Indications: fibro, nerves, replaces cymbalta, Disp: 30 Cap, Rfl: 11    raNITIdine (ZANTAC) 300 mg tablet, Take 1 Tab by mouth daily. Indications: Heartburn, Disp: 30 Tab, Rfl: 6    warfarin (COUMADIN) 5 mg tablet, 1/2 tablet on Tues and Thurs and 1 tablet all other days  Indications: prevent stroke (Patient taking differently: 1/2 tablet on Tues, Thurs & Sun, and 1 tablet all other days  Indications: prevent stroke), Disp: 90 Tab, Rfl: 3    atorvastatin (LIPITOR) 20 mg tablet, Take 1 Tab by mouth daily. Indications: mixed hyperlipidemia, Disp: 30 Tab, Rfl: 6    naloxone 4 mg/actuation spry, Use 1 spray intranasally into 1 nostril.  Use a new Narcan nasal spray for subsequent doses and administer into alternating nostrils. May repeat every 2 to 3 minutes as needed. Indications: OPIATE-INDUCED RESPIRATORY DEPRESSION, Disp: 1 Package, Rfl: 0    atenolol (TENORMIN) 50 mg tablet, Take 1 Tab by mouth daily. Indications: pressure and heart, Disp: 90 Tab, Rfl: 3    Wheel Chair chelle, Man wheelchair. for mobility in the home. Repl prior wheelchair that is falling apart. Dx M06.9 and M62.81 Rheum arthritis and weak trunk mm., Disp: 1 Each, Rfl: 0    amLODIPine (NORVASC) 10 mg tablet, Take 1 Tab by mouth daily. Indications: pressure, Disp: 90 Tab, Rfl: 3    furosemide (LASIX) 40 mg tablet, Take 1 Tab by mouth daily. , Disp: 90 Tab, Rfl: 3    lisinopril (PRINIVIL, ZESTRIL) 20 mg tablet, TAKE 1 TABLET BY MOUTH DAILY for pressure, Disp: 90 Tab, Rfl: 3    ergocalciferol (VITAMIN D2) 50,000 unit capsule, Take 1 Cap by mouth every seven (7) days. , Disp: 4 Cap, Rfl: 3    Insulin Syringe-Needle U-100 0.3 mL 30 x 1/2\" syrg, Use as directed, Disp: 100 Syringe, Rfl: 3    BD INSULIN SYRINGE ULTRA-FINE 1/2 mL 30 x 1/2\" syrg, INJECT INSULIN DAILY, Disp: 100 Syringe, Rfl: 11    docusate sodium (STOOL SOFTENER) 100 mg capsule, Take 100 mg by mouth two (2) times daily as needed for Constipation. , Disp: , Rfl:     Past Medical History:   Diagnosis Date    Atrial fibrillation ([address] 12/2/2013    Cardiomegaly     CKD (chronic kidney disease)     COPD     Degenerative spondylolisthesis     Diabetes (HCC)     IDDM    GERD (gastroesophageal reflux disease)     Hyperlipidemia     Hypertension     Hypokalemia     Myalgia     Osteoarthritis of knee     Rheumatoid arthritis(714.0) 12/2/2013    Tachycardia     Venous insufficiency     Vitamin D deficiency        Past Surgical History:   Procedure Laterality Date    HX HYSTERECTOMY      PARTIAL HYSTERECTOMY       History   Smoking Status    Never Smoker   Smokeless Tobacco    Never Used       Social History     Social History    Marital status:      Spouse name: N/A    Number of children: N/A    Years of education: N/A     Social History Main Topics    Smoking status: Never Smoker    Smokeless tobacco: Never Used    Alcohol use No    Drug use: Yes     Special: Opiates    Sexual activity: Not Currently     Partners: Male     Birth control/ protection: None     Other Topics Concern     Service No    Blood Transfusions No    Caffeine Concern Yes    Occupational Exposure No    Hobby Hazards No    Sleep Concern No    Stress Concern No    Weight Concern Yes     losing weight    Special Diet No    Back Care Yes     pain    Exercise No    Bike Helmet No     n/a    Seat Belt No    Self-Exams No     Social History Narrative       Family History   Problem Relation Age of Onset    Diabetes Father     Diabetes Sister     Heart Disease Brother      ENLARGED HEART    Elevated Lipids Brother     Diabetes Sister     No Known Problems Maternal Grandfather          Objective:     Visit Vitals    /74 (BP 1 Location: Left arm, BP Patient Position: Sitting)    Pulse 78    Temp 97.4 °F (36.3 °C) (Temporal)    Resp 20    Ht 5' 1.5\" (1.562 m)    SpO2 99%     There is no height or weight on file to calculate BMI.  wheelchair dependent  General: Alert and oriented. No acute distress. Well nourished  HEENT :  Ears:TMs are normal. Canals are clear. Eyes: pupils equal, round, react to light and accommodation. Extra ocular movements intact. Nose: patent. Mouth and throat is clear. White film on 1/3 of tongue. Neck:supple full range of motion no thyromegaly. Trachea midline, No carotid bruits. No significant lymphadenopathy  Lungs[de-identified] clear to auscultation without wheezes, rales, or rhonchi. Heart :RRR, S1 & S2 are normal intensity. No murmur; no gallop  Abdomen: bowel sounds active. No tenderness, guarding, rebound, masses, hepatic or spleen enlargement  Back: no CVA tenderness.   Extremities: without clubbing, cyanosis, or edema  Pulses: radial and femoral pulses are normal  Neuro: HMF intact. Cranial nerves II through XII grossly normal.  Motor: is 4 over 5 and symmetrical.   Deep tendon reflexes: +2 equal    Results for orders placed or performed in visit on 04/18/18   CBC WITH AUTOMATED DIFF   Result Value Ref Range    WBC 6.1 3.4 - 10.8 x10E3/uL    RBC 3.74 (L) 3.77 - 5.28 x10E6/uL    HGB 10.8 (L) 11.1 - 15.9 g/dL    HCT 33.8 (L) 34.0 - 46.6 %    MCV 90 79 - 97 fL    MCH 28.9 26.6 - 33.0 pg    MCHC 32.0 31.5 - 35.7 g/dL    RDW 13.8 12.3 - 15.4 %    PLATELET 920 119 - 521 x10E3/uL    NEUTROPHILS 54 Not Estab. %    Lymphocytes 35 Not Estab. %    MONOCYTES 9 Not Estab. %    EOSINOPHILS 1 Not Estab. %    BASOPHILS 1 Not Estab. %    ABS. NEUTROPHILS 3.3 1.4 - 7.0 x10E3/uL    Abs Lymphocytes 2.1 0.7 - 3.1 x10E3/uL    ABS. MONOCYTES 0.6 0.1 - 0.9 x10E3/uL    ABS. EOSINOPHILS 0.1 0.0 - 0.4 x10E3/uL    ABS. BASOPHILS 0.0 0.0 - 0.2 x10E3/uL    IMMATURE GRANULOCYTES 0 Not Estab. %    ABS. IMM. GRANS. 0.0 0.0 - 0.1 Q36U8/EK   METABOLIC PANEL, COMPREHENSIVE   Result Value Ref Range    Glucose 338 (H) 65 - 99 mg/dL    BUN 15 8 - 27 mg/dL    Creatinine 1.02 (H) 0.57 - 1.00 mg/dL    GFR est non-AA 52 (L) >59 mL/min/1.73    GFR est AA 60 >59 mL/min/1.73    BUN/Creatinine ratio 15 12 - 28    Sodium 136 134 - 144 mmol/L    Potassium 4.3 3.5 - 5.2 mmol/L    Chloride 96 96 - 106 mmol/L    CO2 21 18 - 29 mmol/L    Calcium 8.7 8.7 - 10.3 mg/dL    Protein, total 7.6 6.0 - 8.5 g/dL    Albumin 3.7 3.5 - 4.7 g/dL    GLOBULIN, TOTAL 3.9 1.5 - 4.5 g/dL    A-G Ratio 0.9 (L) 1.2 - 2.2    Bilirubin, total 0.4 0.0 - 1.2 mg/dL    Alk.  phosphatase 69 39 - 117 IU/L    AST (SGOT) 18 0 - 40 IU/L    ALT (SGPT) 10 0 - 32 IU/L   CKD REPORT   Result Value Ref Range    Interpretation Note    AMB POC PT/INR   Result Value Ref Range    VALID INTERNAL CONTROL POC Yes     Prothrombin time (POC) 20.6 seconds    INR POC 1.7        Results for orders placed or performed in visit on 04/18/18   CBC WITH AUTOMATED DIFF   Result Value Ref Range    WBC 6.1 3.4 - 10.8 x10E3/uL    RBC 3.74 (L) 3.77 - 5.28 x10E6/uL    HGB 10.8 (L) 11.1 - 15.9 g/dL    HCT 33.8 (L) 34.0 - 46.6 %    MCV 90 79 - 97 fL    MCH 28.9 26.6 - 33.0 pg    MCHC 32.0 31.5 - 35.7 g/dL    RDW 13.8 12.3 - 15.4 %    PLATELET 263 432 - 317 x10E3/uL    NEUTROPHILS 54 Not Estab. %    Lymphocytes 35 Not Estab. %    MONOCYTES 9 Not Estab. %    EOSINOPHILS 1 Not Estab. %    BASOPHILS 1 Not Estab. %    ABS. NEUTROPHILS 3.3 1.4 - 7.0 x10E3/uL    Abs Lymphocytes 2.1 0.7 - 3.1 x10E3/uL    ABS. MONOCYTES 0.6 0.1 - 0.9 x10E3/uL    ABS. EOSINOPHILS 0.1 0.0 - 0.4 x10E3/uL    ABS. BASOPHILS 0.0 0.0 - 0.2 x10E3/uL    IMMATURE GRANULOCYTES 0 Not Estab. %    ABS. IMM. GRANS. 0.0 0.0 - 0.1 x10E3/uL    Narrative    Performed at:  [address]  [id number]  : Belinda Sadler MD, Phone:  [phone]   METABOLIC PANEL, COMPREHENSIVE   Result Value Ref Range    Glucose 338 (H) 65 - 99 mg/dL    BUN 15 8 - 27 mg/dL    Creatinine 1.02 (H) 0.57 - 1.00 mg/dL    GFR est non-AA 52 (L) >59 mL/min/1.73    GFR est AA 60 >59 mL/min/1.73    BUN/Creatinine ratio 15 12 - 28    Sodium 136 134 - 144 mmol/L    Potassium 4.3 3.5 - 5.2 mmol/L    Chloride 96 96 - 106 mmol/L    CO2 21 18 - 29 mmol/L    Calcium 8.7 8.7 - 10.3 mg/dL    Protein, total 7.6 6.0 - 8.5 g/dL    Albumin 3.7 3.5 - 4.7 g/dL    GLOBULIN, TOTAL 3.9 1.5 - 4.5 g/dL    A-G Ratio 0.9 (L) 1.2 - 2.2    Bilirubin, total 0.4 0.0 - 1.2 mg/dL    Alk.  phosphatase 69 39 - 117 IU/L    AST (SGOT) 18 0 - 40 IU/L    ALT (SGPT) 10 0 - 32 IU/L    Narrative    Performed at:  [address]  [id number]  : Belinda Sadler MD, Phone:  [phone]   CKD REPORT   Result Value Ref Range    Interpretation Note     Narrative    Performed at:  [address] [place] [address] A  [address] [facility], [place], [place]  [id number]  : Harriet Alpers PhD, Phone:  [phone]   AMB POC PT/INR   Result Value Ref Range    VALID INTERNAL CONTROL POC Yes     Prothrombin time (POC) 20.6 seconds    INR POC 1.7        Assessment/ Plan:     1. Atrial fibrillation, unspecified type (HCC)    - COLLECTION CAPILLARY BLOOD SPECIMEN  - AMB POC PT/INR    - COLLECTION VENOUS BLOOD,VENIPUNCTURE    2. Type 2 diabetes mellitus with nephropathy (HCC)    - CBC WITH AUTOMATED DIFF  - METABOLIC PANEL, COMPREHENSIVE  - COLLECTION VENOUS BLOOD,VENIPUNCTURE    3. 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)    - CBC WITH AUTOMATED DIFF  - METABOLIC PANEL, COMPREHENSIVE  - COLLECTION VENOUS BLOOD,VENIPUNCTURE    4. Essential hypertension    - CBC WITH AUTOMATED DIFF  - METABOLIC PANEL, COMPREHENSIVE  - COLLECTION VENOUS BLOOD,VENIPUNCTURE    5. Fibromyalgia  On Trammadol  - MONITOR 14-DRUG CLASS PROFILE ([facility])    6. Chronic midline low back pain without sciatica  On trammadol  - MONITOR 14-DRUG CLASS PROFILE ([facility])      Orders Placed This Encounter    COLLECTION CAPILLARY BLOOD SPECIMEN    COLLECTION VENOUS BLOOD,VENIPUNCTURE    CBC WITH AUTOMATED DIFF    METABOLIC PANEL, COMPREHENSIVE    CKD REPORT    AMB POC PT/INR         Verbal and written instructions (see AVS) provided.  Patient expresses understanding of diagnosis and treatment plan. Health Maintenance Due   Topic Date Due    EYE EXAM RETINAL OR DILATED Q1  05/19/2017    GLAUCOMA SCREENING Q2Y  05/19/2018         Follow-up Disposition:  Return in about 2 weeks (around 5/2/2018). or sooner as needed.       MICKIE Schumacher

## 2018-04-20 NOTE — PROGRESS NOTES
This is the Subsequent Medicare Annual Wellness Exam, performed 12 months or more after the Initial AWV or the last Subsequent AWV    I have reviewed the patient's medical history in detail and updated the computerized patient record. History     Past Medical History:   Diagnosis Date    Atrial fibrillation ([address] 12/2/2013    Cardiomegaly     CKD (chronic kidney disease)     COPD     Degenerative spondylolisthesis     Diabetes (HCC)     IDDM    GERD (gastroesophageal reflux disease)     Hyperlipidemia     Hypertension     Hypokalemia     Myalgia     Osteoarthritis of knee     Rheumatoid arthritis(714.0) 12/2/2013    Tachycardia     Venous insufficiency     Vitamin D deficiency       Past Surgical History:   Procedure Laterality Date    HX HYSTERECTOMY      PARTIAL HYSTERECTOMY     Current Outpatient Prescriptions   Medication Sig Dispense Refill    traMADol (ULTRAM) 50 mg tablet Take 1 Tab by mouth every eight (8) hours as needed for Pain. Max Daily Amount: 150 mg. Indications: FIBROMYALGIA, Pain 90 Tab 0    mometasone (ELOCON) 0.1 % topical cream APPLY TO AFFECTED AREA DAILY 45 g 3    glucose blood VI test strips (BLOOD GLUCOSE TEST) strip DX E11.9  Check blood glucose twice a day and prn (as needed) 100 Strip 11    insulin NPH/insulin regular (NOVOLIN 70/30, HUMULIN 70/30) 100 unit/mL (70-30) injection 6 units AM with breakfast and 10 units with dinner  Indications: type 2 diabetes mellitus, sugar, replaces lantus 10 mL 11    SYRINGE AND NEEDLE,INSULIN,1ML (BD INSULIN SYRINGE) by Does Not Apply route. 6 ml 31G  3/10 ml use as directed      venlafaxine-SR (EFFEXOR-XR) 37.5 mg capsule Take 1 Cap by mouth daily. Indications: fibro, nerves, replaces cymbalta 30 Cap 11    raNITIdine (ZANTAC) 300 mg tablet Take 1 Tab by mouth daily.  Indications: Heartburn 30 Tab 6    warfarin (COUMADIN) 5 mg tablet 1/2 tablet on Tues and Thurs and 1 tablet all other days  Indications: prevent stroke (Patient taking differently: 1/2 tablet on Tues, Thurs & Sun, and 1 tablet all other days  Indications: prevent stroke) 90 Tab 3    atorvastatin (LIPITOR) 20 mg tablet Take 1 Tab by mouth daily. Indications: mixed hyperlipidemia 30 Tab 6    naloxone 4 mg/actuation spry Use 1 spray intranasally into 1 nostril. Use a new Narcan nasal spray for subsequent doses and administer into alternating nostrils. May repeat every 2 to 3 minutes as needed. Indications: OPIATE-INDUCED RESPIRATORY DEPRESSION 1 Package 0    atenolol (TENORMIN) 50 mg tablet Take 1 Tab by mouth daily. Indications: pressure and heart 90 Tab 3    Wheel Chair chelle Man wheelchair. for mobility in the home. Repl prior wheelchair that is falling apart. 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 1 Each 0    amLODIPine (NORVASC) 10 mg tablet Take 1 Tab by mouth daily. Indications: pressure 90 Tab 3    furosemide (LASIX) 40 mg tablet Take 1 Tab by mouth daily. 90 Tab 3    lisinopril (PRINIVIL, ZESTRIL) 20 mg tablet TAKE 1 TABLET BY MOUTH DAILY for pressure 90 Tab 3    ergocalciferol (VITAMIN D2) 50,000 unit capsule Take 1 Cap by mouth every seven (7) days. 4 Cap 3    Insulin Syringe-Needle U-100 0.3 mL 30 x 1/2\" syrg Use as directed 100 Syringe 3    BD INSULIN SYRINGE ULTRA-FINE 1/2 mL 30 x 1/2\" syrg INJECT INSULIN DAILY 100 Syringe 11    docusate sodium (STOOL SOFTENER) 100 mg capsule Take 100 mg by mouth two (2) times daily as needed for Constipation.        Allergies   Allergen Reactions    Statins-Hmg-Coa Reductase Inhibitors Myalgia     Family History   Problem Relation Age of Onset    Diabetes Father     Diabetes Sister     Heart Disease Brother      ENLARGED HEART    Elevated Lipids Brother     Diabetes Sister     No Known Problems Maternal Grandfather      Social History   Substance Use Topics    Smoking status: Never Smoker    Smokeless tobacco: Never Used    Alcohol use No     Patient Active Problem List   Diagnosis Code    Rheumatoid arthritis (HCC) M06.9    Lumbosacral spondylosis M47.817    Sacroiliac joint disease M53.3    Back pain M54.9    Diabetes mellitus (HCC) E11.9    Atrial fibrillation (HCC) I48.91    Hypovitaminosis D E55.9    Gastroesophageal reflux disease without esophagitis K21.9    Encounter for current long-term use of anticoagulants Z79.01    Fibromyalgia M79.7    Essential hypertension I10    Weakness of trunk musculature M62.81    Lethargy R53.83    Right buttock pain M79.1    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) E11.69, E78.2    Long term methotrexate user Z79.899    Chronic narcotic use F11.90    Mixed hyperlipidemia E78.2    Vitamin D deficiency E55.9    Anticoagulated on warfarin Z79.01    Type 2 diabetes mellitus with nephropathy (HCC) E11.21       Depression Risk Factor Screening:     PHQ over the last two weeks 4/3/2018   Little interest or pleasure in doing things Nearly every day   Feeling down, depressed or hopeless Several days   Total Score PHQ 2 4   Trouble falling or staying asleep, or sleeping too much -   Feeling tired or having little energy -   Poor appetite or overeating -   Feeling bad about yourself - or that you are a failure or have let yourself or your family down -   Trouble concentrating on things such as school, work, reading or watching TV -   Moving or speaking so slowly that other people could have noticed; or the opposite being so fidgety that others notice -   Thoughts of being better off dead, or hurting yourself in some way -   PHQ 9 Score -   How difficult have these problems made it for you to do your work, take care of your home and get along with others -     Alcohol Risk Factor Screening: You do not drink alcohol or very rarely. Functional Ability and Level of Safety:   Hearing Loss  Hearing is good. Passed whisper test    Activities of Daily Living  The home contains: no safety equipment.   Patient needs help with: phone, transportation, shopping, preparing meals, laundry, housework, managing medications, managing money, eating, dressing, bathing and hygiene    Fall Risk  Fall Risk Assessment, last 12 mths 4/3/2018   Able to walk? Yes   Fall in past 12 months? No   Fall with injury? -   Number of falls in past 12 months -   Fall Risk Score -       Abuse Screen  Patient is not abused    Cognitive Screening   Evaluation of Cognitive Function:  Has your family/caregiver stated any concerns about your memory: no  Normal    Patient Care Team   Patient Care Team:  Ajay Sousa NP as PCP - General (Nurse Practitioner)    Assessment/Plan   Education and counseling provided:  Are appropriate based on today's review and evaluation    Diagnoses and all orders for this visit:    1. Essential hypertension  -     CBC WITH AUTOMATED DIFF  -     METABOLIC PANEL, COMPREHENSIVE  -     COLLECTION VENOUS BLOOD,VENIPUNCTURE    2. Atrial fibrillation, unspecified type (HCC)  -     COLLECTION CAPILLARY BLOOD SPECIMEN  -     AMB POC PT/INR  -     CBC WITH AUTOMATED DIFF  -     METABOLIC PANEL, COMPREHENSIVE  -     COLLECTION VENOUS BLOOD,VENIPUNCTURE    3. Type 2 diabetes mellitus with nephropathy (HCC)  -     CBC WITH AUTOMATED DIFF  -     METABOLIC PANEL, COMPREHENSIVE  -     COLLECTION VENOUS BLOOD,VENIPUNCTURE    4. 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)  -     CBC WITH AUTOMATED DIFF  -     METABOLIC PANEL, COMPREHENSIVE  -     COLLECTION VENOUS BLOOD,VENIPUNCTURE    5. Fibromyalgia  -     MONITOR 14-DRUG CLASS PROFILE ([facility])    6.  Chronic midline low back pain without sciatica  -     MONITOR 14-DRUG CLASS PROFILE ([facility])    Other orders  -     CKD REPORT        Health Maintenance Due   Topic Date Due    EYE EXAM RETINAL OR DILATED Q1  05/19/2017    GLAUCOMA SCREENING Q2Y  05/19/2018     Allie PEREZ

## 2018-04-20 NOTE — PROGRESS NOTES
CBC anemia slight improvement  Area of concern   Glucose markedly elevated is she receiving her insulin as ordered? Is family able to bring in blood glucose readings   Check HGBA1C at visit in 2 weeks.    A healthy eating plan:  \" Emphasizes vegetables, fruits, whole grains, and fat-free or low-fat dairy products  \" Includes lean meats, poultry, fish, beans, eggs, and nuts  \" Limits saturated and trans fats, sodium, and added sugars  \" Controls portion sizes

## 2018-04-20 NOTE — ACP (ADVANCE CARE PLANNING)
Has advanced medical directive scanned into chart. Reviewed at this visit. No changes.   Finesse Every NP-C

## 2018-04-24 LAB
AMPHETAMINES UR QL SCN: NEGATIVE NG/ML
BARBITURATES UR QL SCN: NEGATIVE NG/ML
BENZODIAZ UR QL SCN: NEGATIVE NG/ML
BUPRENORPHINE UR QL: NEGATIVE NG/ML
BZE UR QL SCN: NEGATIVE NG/ML
CANNABINOIDS UR QL SCN: NEGATIVE NG/ML
CREAT UR-MCNC: 112.6 MG/DL (ref 20–300)
FENTANYL+NORFENTANYL UR QL SCN: NEGATIVE PG/ML
MEPERIDINE UR QL: NEGATIVE NG/ML
METHADONE UR QL SCN: NEGATIVE NG/ML
OPIATES UR QL SCN: NEGATIVE NG/ML
OPIATES UR QL SCN: NORMAL NG/ML
OXYCODONE UR CFM-MCNC: 1640 NG/ML
OXYCODONE UR QL CFM: POSITIVE
OXYCODONE+OXYMORPHONE UR QL SCN: NORMAL NG/ML
OXYCODONE+OXYMORPHONE UR QL SCN: POSITIVE
OXYMORPHONE UR CFM-MCNC: 3390 NG/ML
OXYMORPHONE UR QL CFM: POSITIVE
PCP UR QL: NEGATIVE NG/ML
PH UR: 5.1 [PH] (ref 4.5–8.9)
PLEASE NOTE:, 733157: NORMAL
PROPOXYPH UR QL SCN: NEGATIVE NG/ML
SP GR UR: 1.02
TRAMADOL UR CFM-MCNC: >3000 NG/ML
TRAMADOL UR QL SCN: NORMAL NG/ML
TRAMADOL UR-MCNC: POSITIVE UG/ML

## 2018-05-03 ENCOUNTER — OFFICE VISIT (OUTPATIENT)
Dept: FAMILY MEDICINE CLINIC | Age: 78
End: 2018-05-03

## 2018-05-03 VITALS
WEIGHT: 179 LBS | BODY MASS INDEX: 32.94 KG/M2 | HEIGHT: 62 IN | TEMPERATURE: 98.1 F | OXYGEN SATURATION: 98 % | DIASTOLIC BLOOD PRESSURE: 76 MMHG | HEART RATE: 62 BPM | SYSTOLIC BLOOD PRESSURE: 138 MMHG

## 2018-05-03 DIAGNOSIS — Z79.01 ANTICOAGULANT LONG-TERM USE: Primary | ICD-10-CM

## 2018-05-03 NOTE — PROGRESS NOTES
Subjective:     Mookie Garcia is a [de-identified] y.o. female who presents today with the following:  Chief Complaint   Patient presents with    Labs     PT/INR check        Patient Active Problem List   Diagnosis Code    Rheumatoid arthritis ([facility] M06.9    Lumbosacral spondylosis M47.817    Sacroiliac joint disease M53.3    Back pain M54.9    Diabetes mellitus ([facility] E11.9    Atrial fibrillation ([facility] I48.91    Hypovitaminosis D E55.9    Gastroesophageal reflux disease without esophagitis K21.9    Encounter for current long-term use of anticoagulants Z79.01    Fibromyalgia M79.7    Essential hypertension I10    Weakness of trunk musculature M62.81    Lethargy R53.83    Right buttock pain M79.1    Combined hyperlipidemia associated with type 2 diabetes mellitus ([facility] E11.69, E78.2    Long term methotrexate user Z79.899    Chronic narcotic use F11.90    Mixed hyperlipidemia E78.2    Vitamin D deficiency E55.9    Anticoagulated on warfarin Z79.01    Type 2 diabetes mellitus with nephropathy (HCC) E11.21         COMPLIANT WITH MEDICATION:    Denies chest pain, dyspnea, palpitations, headache and blurred vision. Blood pressure normotensive. Anticoagulation. Dx:  Atrial fibrillation. Current symptoms: none  Current control agent: B-blocker  Current anticoagulant: warfarin1/2 on Tuesdays, Thursdays and Sundays 1 tablet all the other days.    History of bleeding problems: none  Lab Results   Component Value Date/Time    INR CANCELED 12/20/2017 12:44 PM    INR, External 1.8 11/03/2017    INR, External 1.6 12/02/2016    INR, External 3.4/40.8 05/13/2016 11:55 AM    INR POC 1.7 04/18/2018 11:15 AM    INR POC 1.3 04/10/2018 04:35 PM    INR POC 1.1 04/03/2018 03:08 PM    Prothrombin time CANCELED 12/20/2017 12:44 PM       ROS:  Gen: denies fever, chills, fatigue, weight loss, weight gain  HEENT:denies blurry vision, nasal congestion, sore throat  Resp: denies dypsnea, cough, wheezing  CV: denies chest pain radiating to the jaws or arms, palpitations, lower extremity edema  Abd: denies nausea, vomiting, diarrhea, constipation  Neuro: denies numbness/tingling  Endo: denies polyuria, polydipsia, heat/cold intolerance  Heme: no lymphadenopathy    Allergies   Allergen Reactions    Statins-Hmg-Coa Reductase Inhibitors Myalgia         Current Outpatient Prescriptions:     traMADol (ULTRAM) 50 mg tablet, Take 1 Tab by mouth every eight (8) hours as needed for Pain. Max Daily Amount: 150 mg. Indications: FIBROMYALGIA, Pain, Disp: 90 Tab, Rfl: 0    mometasone (ELOCON) 0.1 % topical cream, APPLY TO AFFECTED AREA DAILY, Disp: 45 g, Rfl: 3    glucose blood VI test strips (BLOOD GLUCOSE TEST) strip, DX E11.9 Check blood glucose twice a day and prn (as needed), Disp: 100 Strip, Rfl: 11    insulin NPH/insulin regular (NOVOLIN 70/30, HUMULIN 70/30) 100 unit/mL (70-30) injection, 6 units AM with breakfast and 10 units with dinner  Indications: type 2 diabetes mellitus, sugar, replaces lantus, Disp: 10 mL, Rfl: 11    SYRINGE AND NEEDLE,INSULIN,1ML (BD INSULIN SYRINGE), by Does Not Apply route. 6 ml 31G  3/10 ml use as directed, Disp: , Rfl:     venlafaxine-SR (EFFEXOR-XR) 37.5 mg capsule, Take 1 Cap by mouth daily. Indications: fibro, nerves, replaces cymbalta, Disp: 30 Cap, Rfl: 11    raNITIdine (ZANTAC) 300 mg tablet, Take 1 Tab by mouth daily. Indications: Heartburn, Disp: 30 Tab, Rfl: 6    warfarin (COUMADIN) 5 mg tablet, 1/2 tablet on Tues and Thurs and 1 tablet all other days  Indications: prevent stroke (Patient taking differently: 1/2 tablet on Tues, Thurs & Sun, and 1 tablet all other days  Indications: prevent stroke), Disp: 90 Tab, Rfl: 3    atorvastatin (LIPITOR) 20 mg tablet, Take 1 Tab by mouth daily. Indications: mixed hyperlipidemia, Disp: 30 Tab, Rfl: 6    naloxone 4 mg/actuation spry, Use 1 spray intranasally into 1 nostril.  Use a new Narcan nasal spray for subsequent doses and administer into alternating nostrils. May repeat every 2 to 3 minutes as needed. Indications: OPIATE-INDUCED RESPIRATORY DEPRESSION, Disp: 1 Package, Rfl: 0    atenolol (TENORMIN) 50 mg tablet, Take 1 Tab by mouth daily. Indications: pressure and heart, Disp: 90 Tab, Rfl: 3    Wheel Chair chelle, Man wheelchair. for mobility in the home. Repl prior wheelchair that is falling apart. Dx M06.9 and M62.81 Rheum arthritis and weak trunk mm., Disp: 1 Each, Rfl: 0    amLODIPine (NORVASC) 10 mg tablet, Take 1 Tab by mouth daily. Indications: pressure, Disp: 90 Tab, Rfl: 3    furosemide (LASIX) 40 mg tablet, Take 1 Tab by mouth daily. , Disp: 90 Tab, Rfl: 3    lisinopril (PRINIVIL, ZESTRIL) 20 mg tablet, TAKE 1 TABLET BY MOUTH DAILY for pressure, Disp: 90 Tab, Rfl: 3    ergocalciferol (VITAMIN D2) 50,000 unit capsule, Take 1 Cap by mouth every seven (7) days. , Disp: 4 Cap, Rfl: 3    Insulin Syringe-Needle U-100 0.3 mL 30 x 1/2\" syrg, Use as directed, Disp: 100 Syringe, Rfl: 3    BD INSULIN SYRINGE ULTRA-FINE 1/2 mL 30 x 1/2\" syrg, INJECT INSULIN DAILY, Disp: 100 Syringe, Rfl: 11    docusate sodium (STOOL SOFTENER) 100 mg capsule, Take 100 mg by mouth two (2) times daily as needed for Constipation. , Disp: , Rfl:     Past Medical History:   Diagnosis Date    Atrial fibrillation ([facility] 12/2/2013    Cardiomegaly     CKD (chronic kidney disease)     COPD     Degenerative spondylolisthesis     Diabetes (HCC)     IDDM    GERD (gastroesophageal reflux disease)     Hyperlipidemia     Hypertension     Hypokalemia     Myalgia     Osteoarthritis of knee     Rheumatoid arthritis(714.0) 12/2/2013    Tachycardia     Venous insufficiency     Vitamin D deficiency        Past Surgical History:   Procedure Laterality Date    HX HYSTERECTOMY      PARTIAL HYSTERECTOMY       History   Smoking Status    Never Smoker   Smokeless Tobacco    Never Used       Social History     Social History    Marital status:      Spouse name: N/A    Number of children: N/A    Years of education: N/A     Social History Main Topics    Smoking status: Never Smoker    Smokeless tobacco: Never Used    Alcohol use No    Drug use: Yes     Special: Opiates    Sexual activity: Not Currently     Partners: Male     Birth control/ protection: None     Other Topics Concern     Service No    Blood Transfusions No    Caffeine Concern Yes    Occupational Exposure No    Hobby Hazards No    Sleep Concern No    Stress Concern No    Weight Concern Yes     losing weight    Special Diet No    Back Care Yes     pain    Exercise No    Bike Helmet No     n/a    Seat Belt No    Self-Exams No     Social History Narrative       Family History   Problem Relation Age of Onset    Diabetes Father     Diabetes Sister     Heart Disease Brother      ENLARGED HEART    Elevated Lipids Brother     Diabetes Sister     No Known Problems Maternal Grandfather          Objective:     Visit Vitals    /76 (BP 1 Location: Right arm, BP Patient Position: Sitting)    Pulse 62    Temp 98.1 °F (36.7 °C) (Oral)    Ht 5' 1.5\" (1.562 m)    Wt 179 lb (81.2 kg)    SpO2 98%    BMI 33.27 kg/m2     Body mass index is 33.27 kg/(m^2). General: Alert and oriented. No acute distress. Well nourished  HEENT :  Eyes: pupils equal, round, react to light and accommodation. Nose: patent. Mouth and throat is clear. Neck:supple full range of motion no thyromegaly. Trachea midline, No carotid bruits. No significant lymphadenopathy  Lungs[de-identified] clear to auscultation without wheezes, rales, or rhonchi. Heart :RRR, S1 & S2 are normal intensity. No murmur; no gallop  Extremities: without clubbing, cyanosis, or edema  Pulses: radial and femoral pulses are normal  Neuro: HMF intact.  Cranial nerves II through XII grossly normal.      Results for orders placed or performed in visit on 04/18/18   CBC WITH AUTOMATED DIFF   Result Value Ref Range    WBC 6.1 3.4 - 10.8 x10E3/uL    RBC 3.74 (L) 3.77 - 5.28 x10E6/uL    HGB 10.8 (L) 11.1 - 15.9 g/dL    HCT 33.8 (L) 34.0 - 46.6 %    MCV 90 79 - 97 fL    MCH 28.9 26.6 - 33.0 pg    MCHC 32.0 31.5 - 35.7 g/dL    RDW 13.8 12.3 - 15.4 %    PLATELET 367 474 - 142 x10E3/uL    NEUTROPHILS 54 Not Estab. %    Lymphocytes 35 Not Estab. %    MONOCYTES 9 Not Estab. %    EOSINOPHILS 1 Not Estab. %    BASOPHILS 1 Not Estab. %    ABS. NEUTROPHILS 3.3 1.4 - 7.0 x10E3/uL    Abs Lymphocytes 2.1 0.7 - 3.1 x10E3/uL    ABS. MONOCYTES 0.6 0.1 - 0.9 x10E3/uL    ABS. EOSINOPHILS 0.1 0.0 - 0.4 x10E3/uL    ABS. BASOPHILS 0.0 0.0 - 0.2 x10E3/uL    IMMATURE GRANULOCYTES 0 Not Estab. %    ABS. IMM. GRANS. 0.0 0.0 - 0.1 [id number]/FK   METABOLIC PANEL, COMPREHENSIVE   Result Value Ref Range    Glucose 338 (H) 65 - 99 mg/dL    BUN 15 8 - 27 mg/dL    Creatinine 1.02 (H) 0.57 - 1.00 mg/dL    GFR est non-AA 52 (L) >59 mL/min/1.73    GFR est AA 60 >59 mL/min/1.73    BUN/Creatinine ratio 15 12 - 28    Sodium 136 134 - 144 mmol/L    Potassium 4.3 3.5 - 5.2 mmol/L    Chloride 96 96 - 106 mmol/L    CO2 21 18 - 29 mmol/L    Calcium 8.7 8.7 - 10.3 mg/dL    Protein, total 7.6 6.0 - 8.5 g/dL    Albumin 3.7 3.5 - 4.7 g/dL    GLOBULIN, TOTAL 3.9 1.5 - 4.5 g/dL    A-G Ratio 0.9 (L) 1.2 - 2.2    Bilirubin, total 0.4 0.0 - 1.2 mg/dL    Alk. phosphatase 69 39 - 117 IU/L    AST (SGOT) 18 0 - 40 IU/L    ALT (SGPT) 10 0 - 32 IU/L   MONITOR 14-DRUG CLASS PROFILE ([facility])   Result Value Ref Range    Amphetamine Screen, urine Negative Bskzpg=0398 ng/mL    Barbiturates Screen, urine Negative Stpztg=257 ng/mL    Benzodiazepines Screen, urine Negative Sitnfa=568 ng/mL    Cannabinoid Screen, urine Negative Cutoff=20 ng/mL    Cocaine Metab.  Screen, urine Negative Uynqwj=419 ng/mL    Opiate Screen, urine See Final Results Yldyio=190 ng/mL    Oxycodone/Oxymorphone, urine See Final Results Hydpsc=386 ng/mL    Phencyclidine Screen, urine Negative Cutoff=25 ng/mL    Methadone Screen, urine Negative Byzdxo=677 ng/mL    Propoxyphene Screen, urine Negative Crxreh=602 ng/mL    Meperidine screen Negative Wcxenk=381 ng/mL    Tramadol Screen, urine See Final Results Mdicdi=173 ng/mL    FENTANYL, URINE Negative Vfofhu=1271 pg/mL    Buprenorphine, urine Negative Cutoff=10 ng/mL    Creatinine, urine 112.6 20.0 - 300.0 mg/dL    Specific Gravity 1.016     pH, urine 5.1 4.5 - 8.9    Please Note Comment    CKD REPORT   Result Value Ref Range    Interpretation Note    OPIATES, CONFIRM   Result Value Ref Range    Opiates Negative Sboaga=130 ng/mL   OXYCODONE, CONFIRM   Result Value Ref Range    Oxycodone/Oxymorphone Positive (A) Dfboie=201    Oxycodone Positive (A)     Oxycodone confirm 1640 Dambdp=863 ng/mL    Oxymorphone Positive (A)     Oxymorphone confirm 3390 Aedppf=893 ng/mL   TRAMADOL (GC/MS), CONFIRMATION, URINE   Result Value Ref Range    Tramadol Positive (A) Djjqhu=145    Tramadol confirm >3000 Dactnh=534 ng/mL   AMB POC PT/INR   Result Value Ref Range    VALID INTERNAL CONTROL POC Yes     Prothrombin time (POC) 20.6 seconds    INR POC 1.7        No results found for this visit on 05/03/18. Assessment/ Plan:     1. BMI 33.0-33.9,adult  Discussed the patient's BMI with her. The BMI follow up plan is as follows:     dietary management education, guidance, and counseling  encourage exercise  monitor weight  prescribed dietary intake    An After Visit Summary was printed and given to the patient. 2. anticogulation long term / atrial fib  No changes to current medical plan  No orders of the defined types were placed in this encounter. Verbal and written instructions (see AVS) provided.  Patient expresses understanding of diagnosis and treatment plan. Health Maintenance Due   Topic Date Due    EYE EXAM RETINAL OR DILATED Q1  05/19/2017    GLAUCOMA SCREENING Q2Y  05/19/2018    FOOT EXAM Q1  05/22/2018    LIPID PANEL Q1  05/22/2018         Follow-up Disposition:  Return in about 1 month (around 6/3/2018).       MICKIE Pérez

## 2018-05-03 NOTE — MR AVS SNAPSHOT
[address] 
 
 
 [address] 
[phone] Patient: Judi Voss MRN: [id number] OEY:[birth year] Visit Information Date & Time Provider Department Dept. Phone Encounter #  
 5/3/2018  9:30 AM Jazzy De Leon NP [facility] [address] [facility] [phone] [id number] Your Appointments 6/1/2018 11:30 AM  
ESTABLISHED PATIENT with MADELIN Pinzonalondra Lopez ([address]) Appt Note: PT  
 [address] [address]  
[address] [facility] [address] [address] Upcoming Health Maintenance Date Due  
 EYE EXAM RETINAL OR DILATED Q1 5/19/2017 GLAUCOMA SCREENING Q2Y 5/19/2018 FOOT EXAM Q1 5/22/2018 LIPID PANEL Q1 5/22/2018 HEMOGLOBIN A1C Q6M 6/20/2018 Influenza Age 5 to Adult 8/1/2018 Pneumococcal 65+ Low/Medium Risk (2 of 2 - PPSV23) 12/20/2018 MICROALBUMIN Q1 4/3/2019 MEDICARE YEARLY EXAM 4/19/2019 DTaP/Tdap/Td series (2 - Td) 3/7/2027 Allergies as of 5/3/2018  Review Complete On: 5/3/2018 By: Jazzy De Leon NP Severity Noted Reaction Type Reactions Statins-hmg-coa Reductase Inhibitors  10/19/2015    Myalgia Current Immunizations  Never Reviewed No immunizations on file. Not reviewed this visit You Were Diagnosed With   
  
 Codes Comments BMI 33.0-33.9,adult    -  Primary ICD-10-CM: W24.92 
ICD-9-CM: V85.33 Vitals BP Pulse Temp Height(growth percentile) Weight(growth percentile) SpO2  
 138/76 (BP 1 Location: Right arm, BP Patient Position: Sitting) 62 98.1 °F (36.7 °C) (Oral) 5' 1.5\" (1.562 m) 179 lb (81.2 kg) 98% BMI OB Status Smoking Status 33.27 kg/m2 Postmenopausal Never Smoker Vitals History BMI and BSA Data Body Mass Index Body Surface Area  
 33.27 kg/m 2 1.88 m 2 Preferred Pharmacy Pharmacy Name Phone [address], [address] [phone] Your Updated Medication List  
  
   
This list is accurate as of 5/3/18 10:28 AM.  Always use your most recent med list. amLODIPine 10 mg tablet Commonly known as:  [place] Bars Take 1 Tab by mouth daily. Indications: pressure  
  
 atenolol 50 mg tablet Commonly known as:  TENORMIN Take 1 Tab by mouth daily. Indications: pressure and heart  
  
 atorvastatin 20 mg tablet Commonly known as:  LIPITOR Take 1 Tab by mouth daily. Indications: mixed hyperlipidemia BD INSULIN SYRINGE  
by Does Not Apply route. 6 ml 31G  3/10 ml use as directed BD INSULIN SYRINGE ULTRA-FINE 1/2 mL 30 gauge x 1/2\" Syrg Generic drug:  Insulin Syringe-Needle U-100 INJECT INSULIN DAILY  
  
 ergocalciferol 50,000 unit capsule Commonly known as:  VITAMIN D2 Take 1 Cap by mouth every seven (7) days. furosemide 40 mg tablet Commonly known as:  LASIX Take 1 Tab by mouth daily. glucose blood VI test strips strip Commonly known as:  blood glucose test  
DX E11.9 Check blood glucose twice a day and prn (as needed)  
  
 insulin NPH/insulin regular 100 unit/mL (70-30) injection Commonly known as:  NOVOLIN 70/30, HUMULIN 70/30  
6 units AM with breakfast and 10 units with dinner  Indications: type 2 diabetes mellitus, sugar, replaces lantus Insulin Syringe-Needle U-100 0.3 mL 30 gauge x 1/2\" Syrg Use as directed  
  
 lisinopril 20 mg tablet Commonly known as:  PRINIVIL, ZESTRIL  
TAKE 1 TABLET BY MOUTH DAILY for pressure  
  
 mometasone 0.1 % topical cream  
Commonly known as:  ELOCON  
APPLY TO AFFECTED AREA DAILY  
  
 naloxone 4 mg/actuation nasal spray Commonly known as:  [employer] Use 1 spray intranasally into 1 nostril. Use a new Narcan nasal spray for subsequent doses and administer into alternating nostrils.  May repeat every 2 to 3 minutes as needed. Indications: OPIATE-INDUCED RESPIRATORY DEPRESSION  
  
 raNITIdine 300 mg tablet Commonly known as:  ZANTAC Take 1 Tab by mouth daily. Indications: Heartburn STOOL SOFTENER 100 mg capsule Generic drug:  docusate sodium Take 100 mg by mouth two (2) times daily as needed for Constipation. traMADol 50 mg tablet Commonly known as:  ULTRAM  
Take 1 Tab by mouth every eight (8) hours as needed for Pain. Max Daily Amount: 150 mg. Indications: FIBROMYALGIA, Pain  
  
 venlafaxine-SR 37.5 mg capsule Commonly known as:  EFFEXOR-XR Take 1 Cap by mouth daily. Indications: fibro, nerves, replaces cymbalta  
  
 warfarin 5 mg tablet Commonly known as:  COUMADIN  
1/2 tablet on Tues and Thurs and 1 tablet all other days  Indications: prevent stroke [address] [facility] wheelchair. for mobility in the home. Repl prior wheelchair that is falling apart. 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 Introducing [facility]! [employer] introduces [employer] patient portal. Now you can access parts of your medical record, email your doctor's office, and request medication refills online. 1. In your internet browser, go to [email or web address] 2. Click on the First Time User? Click Here link in the Sign In box. You will see the New Member Sign Up page. 3. Enter your [employer] Access Code exactly as it appears below. You will not need to use this code after youve completed the sign-up process. If you do not sign up before the expiration date, you must request a new code. · [employer] Access Code: [email or web address] Expires: 7/2/2018  4:51 PM 
 
4. Enter the last four digits of your Social Security Number (xxxx) and Date of Birth (mm/dd/yyyy) as indicated and click Submit. You will be taken to the next sign-up page. 5. Create a [employer] ID.  This will be your [employer] login ID and cannot be changed, so think of one that is secure and easy to remember. 6. Create a [employer] password. You can change your password at any time. 7. Enter your Password Reset Question and Answer. This can be used at a later time if you forget your password. 8. Enter your e-mail address. You will receive e-mail notification when new information is available in [address] 9. Click Sign Up. You can now view and download portions of your medical record. 10. Click the Download Summary menu link to download a portable copy of your medical information. If you have questions, please visit the Frequently Asked Questions section of the [employer] website. Remember, [employer] is NOT to be used for urgent needs. For medical emergencies, dial 911. Now available from your iPhone and Android! Please provide this summary of care documentation to your next provider. Your primary care clinician is listed as Garett Ching. If you have any questions after today's visit, please call [phone].

## 2018-05-03 NOTE — ACP (ADVANCE CARE PLANNING)
Has advanced medical directive scanned into chart. Reviewed at this visit. No changes.   Kraig Cuellar NP-C

## 2018-05-08 ENCOUNTER — TELEPHONE (OUTPATIENT)
Dept: PALLATIVE CARE | Age: 78
End: 2018-05-08

## 2018-05-08 DIAGNOSIS — G89.29 CHRONIC MIDLINE LOW BACK PAIN WITHOUT SCIATICA: ICD-10-CM

## 2018-05-08 DIAGNOSIS — M54.50 CHRONIC MIDLINE LOW BACK PAIN WITHOUT SCIATICA: ICD-10-CM

## 2018-05-08 DIAGNOSIS — M79.7 FIBROMYALGIA: ICD-10-CM

## 2018-05-08 RX ORDER — TRAMADOL HYDROCHLORIDE 50 MG/1
50 TABLET ORAL
Qty: 10 TAB | Refills: 0 | Status: SHIPPED | OUTPATIENT
Start: 2018-05-08 | End: 2018-05-14 | Stop reason: SDUPTHER

## 2018-05-08 NOTE — TELEPHONE ENCOUNTER
Patient is requesting a refill on Tramadol. She has #4 left and daughter knows that Gabriella Ernst is out of the office this week. We need to let daughter know either way this is handled. Alexa.

## 2018-05-08 NOTE — TELEPHONE ENCOUNTER
Daughter advised by Shawn Cuba LPN, that Dr. Baron Sousa wrote a short supply until Unk Mckenziea returned to the office.

## 2018-05-09 NOTE — TELEPHONE ENCOUNTER
Nurse returned call to [employer] in [address] [facility] [address] office. Julia Dong says she is reviewing outgoing referrals and wanted to check and see if   Hal Moore is going to be seen in  [address] clinic. Nurse explained that she spoke with Dr. Britt Welsh about this referral and pt falls outside of the criteria for our clinic. Nurse sent email to Savanah Simpson on 4/6/18 stating Dr. Britt Welsh would be happy to speak with her on the phone about this pt's chronic pain management.

## 2018-05-14 ENCOUNTER — TELEPHONE (OUTPATIENT)
Dept: FAMILY MEDICINE CLINIC | Age: 78
End: 2018-05-14

## 2018-05-14 DIAGNOSIS — G89.29 CHRONIC MIDLINE LOW BACK PAIN WITHOUT SCIATICA: ICD-10-CM

## 2018-05-14 DIAGNOSIS — M79.7 FIBROMYALGIA: ICD-10-CM

## 2018-05-14 DIAGNOSIS — M54.50 CHRONIC MIDLINE LOW BACK PAIN WITHOUT SCIATICA: ICD-10-CM

## 2018-05-14 NOTE — TELEPHONE ENCOUNTER
Pt was given a temporary amount of Tramadol medicine since [facility] was out last week. Pt requested having the rest of her prescription refilled at [facility].

## 2018-05-15 RX ORDER — TRAMADOL HYDROCHLORIDE 50 MG/1
50 TABLET ORAL
Qty: 60 TAB | Refills: 0 | Status: SHIPPED | OUTPATIENT
Start: 2018-05-15 | End: 2018-06-11 | Stop reason: SDUPTHER

## 2018-05-22 NOTE — PROGRESS NOTES
[facility] Palliative Medicine Office  Nursing Note  [phone]  Fax [phone]     Name:  Wanda Conteh  YOB: 1940     Nurse spoke with Dr. Neda Shaffer regarding the Palliative Medicine referral for Justin Ford for chronic pain management. Our outpatient Palliative Medicine clinic criteria is:  A life-limiting illness with life expectancy of less than 2 years and in addition to the limited life expectancy, the presence of 1 or more COPE needs (Care Decisions, Overwhelming Symptoms, Psychosocial Distress, and End-Stage Disease). Pt does not meet criteria for our clinic. Dr. Melody Carrasco asked this nurse to let referring provider, Dinora Arreola NP know that she would be happy to consult with Ms. Marline Fam by phone and make suggestions about Ms. Azul's pain management. Nurse sent email to Dinora Arreola informing her of above information. Email included our office number [phone]. Dr. Melody Carrasco would  be happy to work out a mutually agreeable time to discuss the patient, or Ms. Marline Fam can email [email or web address]. [email or web address], [email or web address]  Clinical Referral Navigator

## 2018-06-08 ENCOUNTER — TELEPHONE (OUTPATIENT)
Dept: FAMILY MEDICINE CLINIC | Age: 78
End: 2018-06-08

## 2018-06-11 DIAGNOSIS — M79.7 FIBROMYALGIA: ICD-10-CM

## 2018-06-11 DIAGNOSIS — Z79.4 TYPE 2 DIABETES MELLITUS WITHOUT COMPLICATION, WITH LONG-TERM CURRENT USE OF INSULIN (HCC): ICD-10-CM

## 2018-06-11 DIAGNOSIS — E11.9 TYPE 2 DIABETES MELLITUS WITHOUT COMPLICATION, WITH LONG-TERM CURRENT USE OF INSULIN (HCC): ICD-10-CM

## 2018-06-11 DIAGNOSIS — G89.29 CHRONIC MIDLINE LOW BACK PAIN WITHOUT SCIATICA: ICD-10-CM

## 2018-06-11 DIAGNOSIS — M54.50 CHRONIC MIDLINE LOW BACK PAIN WITHOUT SCIATICA: ICD-10-CM

## 2018-06-11 NOTE — TELEPHONE ENCOUNTER
Pt needs a refill on her Tramedol and also when she went to go  her insulin it was really expensive. She was wondering if Natasha GURROLA  Could prescribe something cheaper

## 2018-06-11 NOTE — TELEPHONE ENCOUNTER
Rx refill has been put through and message  routed to [facility] for advice on new cheaper insulin medication.

## 2018-06-13 RX ORDER — TRAMADOL HYDROCHLORIDE 50 MG/1
50 TABLET ORAL
Qty: 60 TAB | Refills: 0 | Status: SHIPPED | OUTPATIENT
Start: 2018-06-13 | End: 2018-07-10 | Stop reason: SDUPTHER

## 2018-06-15 NOTE — TELEPHONE ENCOUNTER
Phone call to patient [phone], mailbox was full and was unable to leave a VM. Call to [phone] # disc or changed.

## 2018-06-15 NOTE — TELEPHONE ENCOUNTER
Phone call to [address] [place] [address] and she stated that her price for the insulin was a $192.00 and the set price by the patient's insurance is 137.94. She feels as though that is due to her meeting her deductible. When she had the Rx filled on 12/20/2017, her cost was $47.00, she probably had met her deductible excluding that amount per Kevin Piña. The other insulin prices that was quoted was even much higher then the one that is being requested. I informed Can Elkins and she stated that Ms Markus Dorsey can maybe have someone to check for other pharmacy prices and give her a call back with the pharmacy of her choosing because she does not want to change the insulin that she is presently on. [address] [place] [address] did call back and stated that they should check with [facility] DR JENNIFER MADDOX.

## 2018-06-19 NOTE — TELEPHONE ENCOUNTER
Granddaughter Dana Dubon returned the [facility] to verify that it is OK to check around to other pharmacies for a more reasonable insulin. She was told to do that per [facility]. I stated to her yes, that is the reason why I was trying to reach her grandma to inform her per Coral Dunham of the same advice and that when she finds the right one, to just give the office a call back with that information.

## 2018-06-19 NOTE — TELEPHONE ENCOUNTER
Daughter called and stated that the Novolin 70/30 can be sent to [facility]. Refill put through and routed to [facility].

## 2018-06-20 RX ORDER — INSULIN ASPART 100 [IU]/ML
INJECTION, SUSPENSION SUBCUTANEOUS
Qty: 3 PEN | Refills: 6 | Status: SHIPPED | OUTPATIENT
Start: 2018-06-20 | End: 2018-09-14 | Stop reason: CLARIF

## 2018-06-20 NOTE — TELEPHONE ENCOUNTER
Per pharmacy insurance will not cover Humulin 70/30 Afia Peosta but will cover Novolog 79 / 30 Flexpen.   New Rx sent

## 2018-06-21 DIAGNOSIS — Z79.4 TYPE 2 DIABETES MELLITUS WITHOUT COMPLICATION, WITH LONG-TERM CURRENT USE OF INSULIN (HCC): ICD-10-CM

## 2018-06-21 DIAGNOSIS — E11.9 TYPE 2 DIABETES MELLITUS WITHOUT COMPLICATION, WITH LONG-TERM CURRENT USE OF INSULIN (HCC): ICD-10-CM

## 2018-06-21 NOTE — TELEPHONE ENCOUNTER
Medication refill and message was put through and routed to [facility].

## 2018-06-21 NOTE — TELEPHONE ENCOUNTER
Arely Benton / [employer] called and stated that Ms Oskar Morris needs the Novolin 70/30 instead of the Novolog which will cause her less then $25.

## 2018-07-10 DIAGNOSIS — M79.7 FIBROMYALGIA: ICD-10-CM

## 2018-07-10 DIAGNOSIS — M54.50 CHRONIC MIDLINE LOW BACK PAIN WITHOUT SCIATICA: ICD-10-CM

## 2018-07-10 DIAGNOSIS — G89.29 CHRONIC MIDLINE LOW BACK PAIN WITHOUT SCIATICA: ICD-10-CM

## 2018-07-12 RX ORDER — TRAMADOL HYDROCHLORIDE 50 MG/1
50 TABLET ORAL
Qty: 30 TAB | Refills: 0 | Status: SHIPPED | OUTPATIENT
Start: 2018-07-12 | End: 2018-07-24 | Stop reason: ALTCHOICE

## 2018-07-12 NOTE — TELEPHONE ENCOUNTER
suggestions for back discomfort. Tylenol arthritis  Application of heat/cold  TENS unit  Massage  OTC pain creams   suggestions for back discomfort.

## 2018-07-12 NOTE — TELEPHONE ENCOUNTER
Den Shaffer returned the [facility] and was informed of Mia's advice. She stated OK, but that her grandma would not want or be able to go back and forth to [facility] to the specialist.  Clifford Daniels has been informed.

## 2018-07-12 NOTE — TELEPHONE ENCOUNTER
Wean off Tramadol  Then start Elavil for fibromyalgia  Application of heat /cold , tylenol (tylenol arthritis)      Will fill 15 day supply of Tramadol for weaning purposes  Take tylenol with Tramadol . From twice a day to once a day to every other day.  Then every 2nd day etc till off the medication

## 2018-07-23 ENCOUNTER — TELEPHONE (OUTPATIENT)
Dept: FAMILY MEDICINE CLINIC | Age: 78
End: 2018-07-23

## 2018-07-23 RX ORDER — AMITRIPTYLINE HYDROCHLORIDE 50 MG/1
50 TABLET, FILM COATED ORAL
Qty: 30 TAB | Refills: 1 | Status: CANCELLED | OUTPATIENT
Start: 2018-07-23

## 2018-07-23 NOTE — TELEPHONE ENCOUNTER
Omi Stauffer called for clarity on Mia's advice regarding the weaning off of the Tramadol. Once explained again, she stated OK, but the Tylenol alone is not working. She would like for the new Rx Elavil to be sent to [facility]. This message and the new Rx put in will be routed to Jefferson Shah. Medication strength, directions and refills will be adjusted by Jefferson Shah.

## 2018-07-24 RX ORDER — AMITRIPTYLINE HYDROCHLORIDE 10 MG/1
10 TABLET, FILM COATED ORAL
Qty: 30 TAB | Refills: 1 | Status: SHIPPED | OUTPATIENT
Start: 2018-07-24 | End: 2018-08-17 | Stop reason: SDUPTHER

## 2018-07-24 NOTE — TELEPHONE ENCOUNTER
----- Message from Cassidy Franco sent at 7/24/2018  2:41 PM EDT -----  Regarding: FW: Latasha Hall NP/Refill      ----- Message -----     From: Mateus Collins     Sent: 7/24/2018   1:36 PM       To: [facility] Front Office  Subject: Latasha Hall NP/Refill                                  The patient's granddaughter Pino Henry is requesting a call back with the status of the fibromyalgia pain medication that was supposed to be sent to the pharmacy last week for the patient.  [employer] on file) [phone]

## 2018-07-24 NOTE — TELEPHONE ENCOUNTER
Patient will be transferring to [facility] with Dr. Shante Boland per granddagiuliana de la rosa. The [facility] office is also closer.

## 2018-07-24 NOTE — TELEPHONE ENCOUNTER
RITESH Christianson to inform her that the Elavil has been sent to the pharmacy. Meghan stated that the pharmacy had called her a little bit ago and stated that the Rx is not there yet. After checking where Rx went, it went to [facility] instead of [facility]. I stated to her that I will cancel the Rx at [facility] and call it into [facility]. She stated OK and thanks so much.

## 2018-07-24 NOTE — TELEPHONE ENCOUNTER
Makenzie Costa / Ramón Vincent to cancel the Elavil 10 mg.   Kourtney Aguilar / [facility], gave her the Elavil 10 mg Rx.

## 2018-07-24 NOTE — TELEPHONE ENCOUNTER
Meghan called stating that her grandma is in a lot of pain today and really needs the Rx for the Elavil to be done.

## 2018-08-01 ENCOUNTER — TELEPHONE (OUTPATIENT)
Dept: FAMILY MEDICINE CLINIC | Age: 78
End: 2018-08-01

## 2018-08-01 DIAGNOSIS — I10 ESSENTIAL HYPERTENSION: ICD-10-CM

## 2018-08-01 RX ORDER — AMLODIPINE BESYLATE 10 MG/1
10 TABLET ORAL DAILY
Qty: 90 TAB | Refills: 3 | Status: CANCELLED | OUTPATIENT
Start: 2018-08-01

## 2018-08-01 NOTE — TELEPHONE ENCOUNTER
PC to [facility] to inform her that I will check with Dr. Ann Peterson on tomorrow when he is in the [place] office, but not sure if she has to wait until Dr. Sea Gupta returns next week. Dr. Sea Gupta is off rest of the week. The MB was full and was unable to leave a VM.

## 2018-08-01 NOTE — TELEPHONE ENCOUNTER
----- Message from Gabo Orozco sent at 8/1/2018 10:11 AM EDT -----  Regarding: FW: /Telephone  This looks like a [place] [facility] patient and Christine Merritt is out of office this week. She has been seeing Kendrick Zapata. Thanks,  ----- Message -----     From: Tsering Olivares     Sent: 8/1/2018   9:21 AM       To: [facility] Real Front Office  Subject: /Telephone                             Stuart Eaton pt.'s granddaughter is calling advised  the medication \"Amitriptyline 10 mg\" needs to be changed to something stronger because this is not helping. Best contact number is [phone].

## 2018-08-02 DIAGNOSIS — I48.0 PAROXYSMAL ATRIAL FIBRILLATION (HCC): ICD-10-CM

## 2018-08-02 DIAGNOSIS — Z79.01 ENCOUNTER FOR CURRENT LONG-TERM USE OF ANTICOAGULANTS: ICD-10-CM

## 2018-08-02 RX ORDER — AMLODIPINE BESYLATE 10 MG/1
10 TABLET ORAL DAILY
Qty: 90 TAB | Refills: 3 | Status: SHIPPED | OUTPATIENT
Start: 2018-08-02 | End: 2018-11-06 | Stop reason: SDUPTHER

## 2018-08-02 RX ORDER — WARFARIN SODIUM 5 MG/1
TABLET ORAL
Qty: 30 TAB | Refills: 0 | Status: SHIPPED | OUTPATIENT
Start: 2018-08-02 | End: 2018-09-12 | Stop reason: SDUPTHER

## 2018-08-02 NOTE — TELEPHONE ENCOUNTER
Rx refill request for Coumadin has been put in and routed to Dr. Vini Velazquez. SW granddaughter Avelina perez, she stated that she would like an explanation why her grandmother's Amlodipine was denied on yesterday?

## 2018-08-02 NOTE — TELEPHONE ENCOUNTER
----- Message from Mevrat Morel sent at 8/2/2018  7:50 AM EDT -----  Regarding: FW: Dr. Brandon Villagomez      ----- Message -----     From: Suleiman Guerra     Sent: 8/2/2018   7:44 AM       To: [facility] Front Office  Subject: Dr. Martin Dockery, granddaughter,  is requesting a call back regarding pt medication request for \"Warfarin\" and her bp medication. Pt granddaughter has a questions about the pt meds.     Best contact number [phone]

## 2018-08-03 NOTE — TELEPHONE ENCOUNTER
Error, this has not been addressed yet, will route to Dr. Amy Schafer for when he returns to the office on Monday.

## 2018-08-03 NOTE — TELEPHONE ENCOUNTER
RITESH perez, to inform her that the advice for the medication change was not addressed by Dr. Vini Velazquez. He is off today, but the message has been sent to Dr. Angel for when he returns in the office on Monday. I stated to her that I am still not sure if it has to wait until Sabas Chhaya returns on August 14 th. She stated OK.

## 2018-08-06 NOTE — TELEPHONE ENCOUNTER
SW Dr. Mc Read, he stated that Ms Ravinder Lombardo amitriptyline can be increased to 2 pills (20 mg) nightly about 5 minutes before going to bed. PC to [facility],  was left to return the call.

## 2018-08-07 NOTE — TELEPHONE ENCOUNTER
RITESH perez, she was given Dr. Ronaldo Alanis advice for Ms Kaitlin St. I stated to her also that she is probably going to run out of medicine before its scheduled time since she now will be taking 2 tabs instead of one. Her insurance company should pay since her dosage was changed by the doctor, so she can call for a refill a couple of days before running out. She stated OK.

## 2018-08-17 RX ORDER — AMITRIPTYLINE HYDROCHLORIDE 10 MG/1
TABLET, FILM COATED ORAL
Qty: 60 TAB | Refills: 1 | Status: SHIPPED | OUTPATIENT
Start: 2018-08-17 | End: 2018-10-04 | Stop reason: SDUPTHER

## 2018-08-17 NOTE — TELEPHONE ENCOUNTER
Retia Citizen, Ms Crowell Range was increased per Dr. Lesly Severs. She does have a refill left, but for only # 30.

## 2018-08-17 NOTE — TELEPHONE ENCOUNTER
SW granddaughter estrella to see if she wants to get the # 30 first, or would she like to disregard that and have William Hall to do a new refill for # 61. She stated could William Hall just please do the new Refill for # 61 since she is taking 2 pills at night so it will not be confusing to her? The refill has been put in and routed to William Hall.

## 2018-09-04 ENCOUNTER — TELEPHONE (OUTPATIENT)
Dept: FAMILY MEDICINE CLINIC | Age: 78
End: 2018-09-04

## 2018-09-04 NOTE — TELEPHONE ENCOUNTER
I called and Hesham Calvin / [facility] DR JENNIFER MADDOX, she stated that the Rx is there, but on hold. This happens after 10 days with no , but it will be put back out today for . Meghan was called and informed and stated that she will be picking it up today.

## 2018-09-04 NOTE — TELEPHONE ENCOUNTER
RITESH perez, she stated when she [facility], they stated that the Rx for the Elavil was not there. I stated that according to Ms Azul's chart, the Rx refill wan sent over on 8/17/2018 with taking one tablet bid, # 60 with one refill. The pharmacy received it on 8/17/18 at 10:53 am, but I will give them a call to check on it and give her a call back. She stated Ok.

## 2018-09-04 NOTE — TELEPHONE ENCOUNTER
Pts granddaughter Maria Ines Avila called stating that Katie Maravilla filled and prescription for Sole's Fibromyalgia and Arthritis. Pt needs a refill but the granddaughter requested to speak with Reginald Cervantes first since she was very helpful the last time through this process.  Call Meghan back at [phone]

## 2018-09-12 DIAGNOSIS — I10 ESSENTIAL HYPERTENSION: ICD-10-CM

## 2018-09-12 RX ORDER — ATENOLOL 50 MG/1
TABLET ORAL
Qty: 90 TAB | Refills: 0 | Status: SHIPPED | OUTPATIENT
Start: 2018-09-12 | End: 2019-01-01 | Stop reason: SDUPTHER

## 2019-02-26 PROBLEM — E66.01 SEVERE OBESITY (HCC): Status: ACTIVE | Noted: 2019-02-26

## 2019-04-02 DIAGNOSIS — E78.2 MIXED HYPERLIPIDEMIA: ICD-10-CM

## 2019-04-03 RX ORDER — ATORVASTATIN CALCIUM 20 MG/1
TABLET, FILM COATED ORAL
Qty: 90 TAB | Refills: 2 | Status: SHIPPED | OUTPATIENT
Start: 2019-04-03 | End: 2019-01-01 | Stop reason: SDUPTHER

## 2020-01-01 ENCOUNTER — OFFICE VISIT (OUTPATIENT)
Dept: SURGERY | Age: 80
End: 2020-01-01

## 2020-01-01 VITALS
SYSTOLIC BLOOD PRESSURE: 163 MMHG | DIASTOLIC BLOOD PRESSURE: 67 MMHG | RESPIRATION RATE: 18 BRPM | HEART RATE: 70 BPM | TEMPERATURE: 98 F | HEIGHT: 61 IN | BODY MASS INDEX: 36.82 KG/M2 | WEIGHT: 195 LBS | OXYGEN SATURATION: 99 %

## 2020-01-01 DIAGNOSIS — G89.29 CHRONIC BILATERAL LOW BACK PAIN WITHOUT SCIATICA: Primary | ICD-10-CM

## 2020-01-01 DIAGNOSIS — K80.20 GALLSTONES: ICD-10-CM

## 2020-01-01 DIAGNOSIS — M54.50 CHRONIC BILATERAL LOW BACK PAIN WITHOUT SCIATICA: Primary | ICD-10-CM

## 2020-01-01 RX ORDER — TRAMADOL HYDROCHLORIDE 50 MG/1
50 TABLET ORAL
Qty: 30 TAB | Refills: 0 | Status: SHIPPED | OUTPATIENT
Start: 2020-01-01 | End: 2020-01-01

## 2020-03-20 PROBLEM — E11.40 TYPE 2 DIABETES MELLITUS WITH DIABETIC NEUROPATHY (HCC): Status: ACTIVE | Noted: 2020-01-01

## 2020-03-30 PROBLEM — K80.20 GALLSTONES: Status: ACTIVE | Noted: 2020-01-01

## 2020-03-30 NOTE — PROGRESS NOTES
1. Have you been to the ER, urgent care clinic since your last visit? Hospitalized since your last visit? Yes When: 3/14/20, enrike natalya, abdominal pain 2. Have you seen or consulted any other health care providers outside of the [address] [place] [address] since your last visit? Include any pap smears or colon screening.  Yes When: new pt

## 2020-03-30 NOTE — PATIENT INSTRUCTIONS
Back Pain: Care Instructions Your Care Instructions Back pain has many possible causes. It is often related to problems with muscles and ligaments of the back. It may also be related to problems with the nerves, discs, or bones of the back. Moving, lifting, standing, sitting, or sleeping in an awkward way can strain the back. Sometimes you don't notice the injury until later. Arthritis is another common cause of back pain. Although it may hurt a lot, back pain usually improves on its own within several weeks. Most people recover in 12 weeks or less. Using good home treatment and being careful not to stress your back can help you feel better sooner. Follow-up care is a key part of your treatment and safety. Be sure to make and go to all appointments, and call your doctor if you are having problems. It's also a good idea to know your test results and keep a list of the medicines you take. How can you care for yourself at home? · Sit or lie in positions that are most comfortable and reduce your pain. Try one of these positions when you lie down: ? Lie on your back with your knees bent and supported by large pillows. ? Lie on the floor with your legs on the seat of a sofa or chair. ? Lie on your side with your knees and hips bent and a pillow between your legs. ? Lie on your stomach if it does not make pain worse. · Do not sit up in bed, and avoid soft couches and twisted positions. Bed rest can help relieve pain at first, but it delays healing. Avoid bed rest after the first day of back pain. · Change positions every 30 minutes. If you must sit for long periods of time, take breaks from sitting. Get up and walk around, or lie in a comfortable position. · Try using a heating pad on a low or medium setting for 15 to 20 minutes every 2 or 3 hours. Try a warm shower in place of one session with the heating pad. · You can also try an ice pack for 10 to 15 minutes every 2 to 3 hours. Put a thin cloth between the ice pack and your skin. · Take pain medicines exactly as directed. ? If the doctor gave you a prescription medicine for pain, take it as prescribed. ? If you are not taking a prescription pain medicine, ask your doctor if you can take an over-the-counter medicine. · Take short walks several times a day. You can start with 5 to 10 minutes, 3 or 4 times a day, and work up to longer walks. Walk on level surfaces and avoid hills and stairs until your back is better. · Return to work and other activities as soon as you can. Continued rest without activity is usually not good for your back. · To prevent future back pain, do exercises to stretch and strengthen your back and stomach. Learn how to use good posture, safe lifting techniques, and proper body mechanics. When should you call for help? Call your doctor now or seek immediate medical care if: 
  · You have new or worsening numbness in your legs.  
  · You have new or worsening weakness in your legs. (This could make it hard to stand up.)  
  · You lose control of your bladder or bowels.  
 Watch closely for changes in your health, and be sure to contact your doctor if: 
  · You have a fever, lose weight, or don't feel well.  
  · You do not get better as expected. Where can you learn more? Go to http://hector-jaquelin.info/ Enter T043 in the search box to learn more about \"Back Pain: Care Instructions. \" Current as of: June 26, 2019Content Version: 12.4 © 2468-8202 [employer]. Care instructions adapted under license by [employer] (which disclaims liability or warranty for this information). If you have questions about a medical condition or this instruction, always ask your healthcare professional. [place] [address] any warranty or liability for your use of this information.

## 2020-03-30 NOTE — PROGRESS NOTES
Ad Vallejo is a 78 y.o. female who presents today with the following: Chief Complaint Patient presents with  Abdominal Pain HPI    
 
78-year-old female with multiple comorbid medical conditions who presents to our office as a referral by Dr. Lucina Parker for right flank and bilateral lower back pain and findings of cholelithiasis. Apparently 2 weeks ago she went to the emergency department at [address] [place] [address] because of these complaints. They have been present for approximately 3 weeks prior to this. She was evaluated with a UA which showed microscopic hematuria as well as bacteriuria and a CT scan which showed cholelithiasis without evidence of cholecystitis. She was seen by Dr. Lucina Parker in follow-up and he had recommended both urology evaluation as well as surgery evaluation for the cholelithiasis. Patient denies any specific foods causing her to have any of the abdominal pain or back pain. She states that she has had some nausea. When questioned in the office she points to her bilateral lower back as her areas of pain. Her granddaughter presents with her and states that Toradol has helped. Her past medical conditions include atrial fibrillation for which she had some type of procedure for in the past as well as shortness of breath and limited mobility with her being in a wheelchair. She is also an insulin-dependent diabetic. She is on anticoagulation for her atrial fibrillation. Past Medical History:  
Diagnosis Date  Atrial fibrillation ([address] 12/2/2013  Cardiomegaly  CKD (chronic kidney disease)  COPD  Degenerative spondylolisthesis  Diabetes ([facility] [address] IDDM  Fibromyalgia  GERD (gastroesophageal reflux disease)  Hyperlipidemia  Hypertension  Hypokalemia  Myalgia  Osteoarthritis of knee  Rheumatoid arthritis(714.0) 12/2/2013  Tachycardia  Venous insufficiency  Vitamin D deficiency Past Surgical History: Procedure Laterality Date  HX HYSTERECTOMY PARTIAL HYSTERECTOMY Social History Socioeconomic History  Marital status:  Spouse name: Not on file  Number of children: Not on file  Years of education: Not on file  Highest education level: Not on file Occupational History  Not on file Social Needs  Financial resource strain: Not on file  Food insecurity Worry: Not on file Inability: Not on file  Transportation needs Medical: Not on file Non-medical: Not on file Tobacco Use  Smoking status: Never Smoker  Smokeless tobacco: Never Used Substance and Sexual Activity  Alcohol use: No  
  Frequency: Never Binge frequency: Never  Drug use: Yes Types: Opiates  Sexual activity: Not Currently Partners: Male Birth control/protection: None Lifestyle  Physical activity Days per week: Not on file Minutes per session: Not on file  Stress: Not on file Relationships  Social connections Talks on phone: Not on file Gets together: Not on file Attends Gnosticist service: Not on file Active member of club or organization: Not on file Attends meetings of clubs or organizations: Not on file Relationship status: Not on file  Intimate partner violence Fear of current or ex partner: Not on file Emotionally abused: Not on file Physically abused: Not on file Forced sexual activity: Not on file Other Topics Concern   Service No  
 Blood Transfusions No  
 Caffeine Concern Yes  Occupational Exposure No  
 Hobby Hazards No  
 Sleep Concern No  
 Stress Concern No  
 Weight Concern Yes Comment: losing weight  Special Diet No  
 Back Care Yes Comment: pain  Exercise No  
 Bike Helmet No  
  Comment: n/a  Seat Belt No  
 Self-Exams No  
Social History Narrative  Not on file Family History Problem Relation Age of Onset  Diabetes Father  Diabetes Sister  Heart Disease Brother ENLARGED HEART  Elevated Lipids Brother  Diabetes Sister  No Known Problems Maternal Grandfather Allergies Allergen Reactions  Statins-Hmg-Coa Reductase Inhibitors Myalgia The above histories, medications and allergies have been reviewed. Review of Systems Constitutional: Positive for malaise/fatigue. Gastrointestinal: Positive for abdominal pain. Genitourinary: Positive for flank pain and hematuria. Endo/Heme/Allergies: Bruises/bleeds easily. Psychiatric/Behavioral: The patient is nervous/anxious. Visit Vitals /67 (BP 1 Location: Left arm, BP Patient Position: Sitting) Pulse 70 Temp 98 °F (36.7 °C) (Oral) Resp 18 Ht 5' 1\" (1.549 m) Wt 195 lb (88.5 kg) SpO2 99% BMI 36.84 kg/m² Physical Exam 
Constitutional:   
   Appearance: She is obese. Comments: In a wheelchair Cardiovascular:  
   Rate and Rhythm: Rhythm irregular. Abdominal:  
   Palpations: There is no mass. Comments: Infraumbilical midline incision which is well-healed. She does have point tenderness that is mostly toward her anterior superior iliac spines bilaterally as well as bilateral flank pain and subcostal tenderness. 1. Chronic bilateral low back pain without sciatica He has a history of arthritis and this certainly may be the source of some of her problems. She also had uric acid crystals apparently and microscopic hematuria which may be contributing to her problems. A referral has been placed for urology but no appointment has been made. I would encourage her to have this evaluated. She also had bacteriuria and they state that they have not been given antibiotics and I do not have any results of the culture. I also would recommend follow-up on this. 2. Gallstones It does not sound like true gallbladder disease based on her symptomatology as well as her physical exam and the location of her pain. I would like to obtain an ultrasound of her gallbladder but based on the current health crisis this is not something that we can obtain at this facility at this time. I think this is probably going to be a low yield test but I think it is a good starting point. They have requested an additional prescription of tramadol. I have told him I will provide him 30 tablets on a one-time basis. Follow-up and Dispositions · Return if symptoms worsen or fail to improve.  
  
 
 
Caren Tatum MD

## 2020-06-21 NOTE — MR AVS SNAPSHOT
[facility] 
 
 
 [address] 
[phone] Patient: Rosie Hodgkins MRN: [id number] EZZ:[id number] Visit Information Date & Time Provider Department Dept. Phone Encounter #  
 4/10/2018  4:00 PM Homero Polk NP [facility] [address] [facility] [phone] [id number] Your Appointments 4/18/2018 11:00 AM  
ESTABLISHED PATIENT with Homero Polk NP  
[address] [place] ([address]) Appt Note: 4 mo f/u  
 [address] [address]  
[address] [facility] [address] [address] Upcoming Health Maintenance Date Due  
 EYE EXAM RETINAL OR DILATED Q1 5/19/2017 GLAUCOMA SCREENING Q2Y 5/19/2018 FOOT EXAM Q1 5/22/2018 LIPID PANEL Q1 5/22/2018 HEMOGLOBIN A1C Q6M 6/20/2018 Pneumococcal 65+ Low/Medium Risk (2 of 2 - PPSV23) 12/20/2018 MICROALBUMIN Q1 4/3/2019 MEDICARE YEARLY EXAM 4/4/2019 DTaP/Tdap/Td series (2 - Td) 3/7/2027 Allergies as of 4/10/2018  Review Complete On: 4/10/2018 By: Alesia Liu RN Severity Noted Reaction Type Reactions Statins-hmg-coa Reductase Inhibitors  10/19/2015    Myalgia Current Immunizations  Never Reviewed No immunizations on file. Not reviewed this visit You Were Diagnosed With   
  
 Codes Comments Anticoagulated on warfarin    -  Primary ICD-10-CM: Z79.01 
ICD-9-CM: V58.61 Atrial fibrillation, unspecified type ([facility]     ICD-10-CM: I48.91 
ICD-9-CM: 427.31 Vitals BP Pulse Temp Resp Height(growth percentile) OB Status 130/80 (BP 1 Location: Left arm, BP Patient Position: Sitting) 66 97.8 °F (36.6 °C) (Temporal) 18 (P) 5' 1.5\" (1.562 m) Postmenopausal  
 Smoking Status Never Smoker Vitals History Preferred Pharmacy Pharmacy Name Phone XR @[email or web address]   [address] [facility], [address] [place] [address] [phone] Your Updated Medication List  
  
   
This list is accurate as of 4/10/18  5:05 PM.  Always use your most recent med list. amLODIPine 10 mg tablet Commonly known as:  Chinchilla Josep Take 1 Tab by mouth daily. Indications: pressure  
  
 atenolol 50 mg tablet Commonly known as:  TENORMIN Take 1 Tab by mouth daily. Indications: pressure and heart  
  
 atorvastatin 20 mg tablet Commonly known as:  LIPITOR Take 1 Tab by mouth daily. Indications: mixed hyperlipidemia BD INSULIN SYRINGE  
by Does Not Apply route. 6 ml 31G  3/10 ml use as directed BD INSULIN SYRINGE ULTRA-FINE 1/2 mL 30 gauge x 1/2\" Syrg Generic drug:  Insulin Syringe-Needle U-100 INJECT INSULIN DAILY  
  
 ergocalciferol 50,000 unit capsule Commonly known as:  VITAMIN D2 Take 1 Cap by mouth every seven (7) days. furosemide 40 mg tablet Commonly known as:  LASIX Take 1 Tab by mouth daily. glucose blood VI test strips strip Commonly known as:  blood glucose test  
DX E11.9 Check blood glucose twice a day and prn (as needed)  
  
 insulin NPH/insulin regular 100 unit/mL (70-30) injection Commonly known as:  NOVOLIN 70/30, HUMULIN 70/30  
6 units AM with breakfast and 10 units with dinner  Indications: type 2 diabetes mellitus, sugar, replaces lantus Insulin Syringe-Needle U-100 0.3 mL 30 gauge x 1/2\" Syrg Use as directed  
  
 lisinopril 20 mg tablet Commonly known as:  PRINIVIL, ZESTRIL  
TAKE 1 TABLET BY MOUTH DAILY for pressure  
  
 mometasone 0.1 % topical cream  
Commonly known as:  ELOCON  
APPLY TO AFFECTED AREA DAILY  
  
 naloxone 4 mg/actuation nasal spray Commonly known as:  [employer] Use 1 spray intranasally into 1 nostril. Use a new Narcan nasal spray for subsequent doses and administer into alternating nostrils.  May repeat every 2 to 3 minutes as needed. Indications: OPIATE-INDUCED RESPIRATORY DEPRESSION  
  
 raNITIdine 300 mg tablet Commonly known as:  ZANTAC Take 1 Tab by mouth daily. Indications: Heartburn STOOL SOFTENER 100 mg capsule Generic drug:  docusate sodium Take 100 mg by mouth two (2) times daily as needed for Constipation. traMADol 50 mg tablet Commonly known as:  ULTRAM  
Take 1 Tab by mouth every eight (8) hours as needed for Pain. Max Daily Amount: 150 mg. Indications: FIBROMYALGIA, Pain  
  
 venlafaxine-SR 37.5 mg capsule Commonly known as:  EFFEXOR-XR Take 1 Cap by mouth daily. Indications: fibro, nerves, replaces cymbalta  
  
 warfarin 5 mg tablet Commonly known as:  COUMADIN  
1/2 tablet on Tues and Thurs and 1 tablet all other days  Indications: prevent stroke [facility] [facility] wheelchair. for mobility in the home. Repl prior wheelchair that is falling apart. 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 We Performed the Following AMB POC PT/INR [32400 CPT(R)] COLLECTION CAPILLARY BLOOD SPECIMEN [43508 CPT(R)] Introducing [facility] & HEALTH SERVICES! Maine Marinelli introduces [employer] patient portal. Now you can access parts of your medical record, email your doctor's office, and request medication refills online. 1. In your internet browser, go to [email or web address] 2. Click on the First Time User? Click Here link in the Sign In box. You will see the New Member Sign Up page. 3. Enter your [employer] Access Code exactly as it appears below. You will not need to use this code after youve completed the sign-up process. If you do not sign up before the expiration date, you must request a new code. · [employer] Access Code: [email or web address] Expires: 7/2/2018  4:51 PM 
 
4. Enter the last four digits of your Social Security Number (xxxx) and Date of Birth (mm/dd/yyyy) as indicated and click Submit.  You will be taken to the next sign-up page. 5. Create a [email or web address] ID. This will be your [email or web address] login ID and cannot be changed, so think of one that is secure and easy to remember. 6. Create a [email or web address] password. You can change your password at any time. 7. Enter your Password Reset Question and Answer. This can be used at a later time if you forget your password. 8. Enter your e-mail address. You will receive e-mail notification when new information is available in [address] 9. Click Sign Up. You can now view and download portions of your medical record. 10. Click the Download Summary menu link to download a portable copy of your medical information. If you have questions, please visit the Frequently Asked Questions section of the [email or web address] website. Remember, [email or web address] is NOT to be used for urgent needs. For medical emergencies, dial 911. Now available from your iPhone and Android! Please provide this summary of care documentation to your next provider. Your primary care clinician is listed as Suman Simon. If you have any questions after today's visit, please call [phone].

## 2021-01-15 NOTE — MR AVS SNAPSHOT
Visit Information Date & Time Provider Department Dept. Phone Encounter #  
 11/3/2017  2:30 PM Marlin Erickson, [address] [phone] [id number] Follow-up Instructions Return in about 2 weeks (around 11/17/2017) for inr . Upcoming Health Maintenance Date Due MICROALBUMIN Q1 1/25/1950 OSTEOPOROSIS SCREENING (DEXA) 1/25/2003 Pneumococcal 65+ Low/Medium Risk (1 of 2 - PCV13) 1/25/2003 EYE EXAM RETINAL OR DILATED Q1 5/19/2017 HEMOGLOBIN A1C Q6M 11/22/2017 MEDICARE YEARLY EXAM 3/28/2018 GLAUCOMA SCREENING Q2Y 5/19/2018 FOOT EXAM Q1 5/22/2018 LIPID PANEL Q1 5/22/2018 DTaP/Tdap/Td series (2 - Td) 3/7/2027 Allergies as of 11/3/2017  Review Complete On: 11/3/2017 By: NICOLA Garrido Severity Noted Reaction Type Reactions Statins-hmg-coa Reductase Inhibitors  10/19/2015    Myalgia Current Immunizations  Never Reviewed No immunizations on file. Not reviewed this visit You Were Diagnosed With   
  
 Codes Comments Atrial fibrillation, unspecified type ([facility] 75.)    -  Primary ICD-10-CM: I48.91 
ICD-9-CM: 427.31 Depression, unspecified depression type     ICD-10-CM: F32.9 ICD-9-CM: 421 Rheumatoid arthritis involving multiple sites with positive rheumatoid factor (HCC)     ICD-10-CM: M05.79 ICD-9-CM: 714.0 Spondylosis of lumbosacral region, unspecified spinal osteoarthritis complication status     G-75-: M47.817 ICD-9-CM: 721.3 Sacroiliac joint disease     ICD-10-CM: M53.3 ICD-9-CM: 724.6 Chronic narcotic use     ICD-10-CM: F11.90 ICD-9-CM: 305.50 Fibromyalgia     ICD-10-CM: M79.7 ICD-9-CM: 729.1 Gastroesophageal reflux disease, esophagitis presence not specified     ICD-10-CM: K21.9 ICD-9-CM: 530.81 Vitals BP Pulse Temp Resp Height(growth percentile) Weight(growth percentile) 134/72 (BP 1 Location: Left arm, BP Patient Position: Sitting) 72 96.8 °F (36 °C) (Temporal) 18 5' 2\" (1.575 m) 172 lb (78 kg) SpO2 BMI OB Status Smoking Status 97% 31.46 kg/m2 Postmenopausal Never Smoker BMI and BSA Data Body Mass Index Body Surface Area  
 31.46 kg/m 2 1.85 m 2 Preferred Pharmacy Pharmacy Name Phone [address], [place] Fabrice Nicolas [phone] Your Updated Medication List  
  
   
This list is accurate as of: 11/3/17  3:09 PM.  Always use your most recent med list.  
  
  
  
  
 ALPRAZolam 0.25 mg tablet Commonly known as:  XANAX  
TAKE 1 TABLET BY MOUTH TWICE DAILY AS NEEDED FOR ANXIETY  
  
 amLODIPine 10 mg tablet Commonly known as:  Radha Fraction Take 1 Tab by mouth daily. Indications: pressure  
  
 atenolol 50 mg tablet Commonly known as:  TENORMIN Take 1 Tab by mouth daily. Indications: pressure and heart  
  
 atorvastatin 20 mg tablet Commonly known as:  LIPITOR Take 1 Tab by mouth daily. Indications: mixed hyperlipidemia BD INSULIN SYRINGE ULTRA-FINE 1/2 mL 30 gauge x 1/2\" Syrg Generic drug:  Insulin Syringe-Needle U-100 INJECT INSULIN DAILY  
  
 ergocalciferol 50,000 unit capsule Commonly known as:  VITAMIN D2 Take 1 Cap by mouth every seven (7) days. furosemide 40 mg tablet Commonly known as:  LASIX Take 1 Tab by mouth daily. HYDROcodone-acetaminophen 7.5-325 mg per tablet Commonly known as:  Thelbert Williamston Take 1 Tab by mouth two (2) times daily as needed for Pain. Max Daily Amount: 2 Tabs. insulin NPH/insulin regular 100 unit/mL (70-30) injection Commonly known as:  NOVOLIN 70/30, HUMULIN 70/30  
6 units AM with breakfast and 10 units with dinner  Indications: sugar, replaces lantus Insulin Syringe-Needle U-100 0.3 mL 30 gauge x 1/2\" Syrg Use as directed  
  
 lisinopril 20 mg tablet Commonly known as:  Srinivas Reyna  
 TAKE 1 TABLET BY MOUTH DAILY for pressure  
  
 mometasone 0.1 % topical cream  
Commonly known as:  ELOCON  
APPLY TO AFFECTED AREA DAILY  
  
 naloxone 4 mg/actuation nasal spray Commonly known as:  [employer] Use 1 spray intranasally into 1 nostril. Use a new Narcan nasal spray for subsequent doses and administer into alternating nostrils. May repeat every 2 to 3 minutes as needed. Indications: OPIATE-INDUCED RESPIRATORY DEPRESSION  
  
 raNITIdine 300 mg tablet Commonly known as:  ZANTAC Take 1 Tab by mouth daily. Indications: Heartburn STOOL SOFTENER 100 mg capsule Generic drug:  docusate sodium Take 100 mg by mouth two (2) times daily as needed for Constipation. venlafaxine-SR 37.5 mg capsule Commonly known as:  EFFEXOR-XR Take 1 Cap by mouth daily. Indications: fibro, nerves, replaces cymbalta  
  
 warfarin 5 mg tablet Commonly known as:  COUMADIN  
1/2 tablet on Tues and Thurs and 1 tablet all other days  Indications: prevent stroke [address] [facility] wheelchair. for mobility in the home. Repl prior wheelchair that is falling apart. 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 Prescriptions Printed Refills HYDROcodone-acetaminophen (NORCO) 7.5-325 mg per tablet 0 Sig: Take 1 Tab by mouth two (2) times daily as needed for Pain. Max Daily Amount: 2 Tabs. Class: Print Route: Oral  
 ALPRAZolam (XANAX) 0.25 mg tablet 1 Sig: TAKE 1 TABLET BY MOUTH TWICE DAILY AS NEEDED FOR ANXIETY Class: Print Prescriptions Sent to Pharmacy Refills  
 mometasone (ELOCON) 0.1 % topical cream 2 Sig: APPLY TO AFFECTED AREA DAILY Class: Normal  
 Pharm[facility]: [address], [facility] Ph #: [phone]  
 venlafaxine-SR (EFFEXOR-XR) 37.5 mg capsule 11 Sig: Take 1 Cap by mouth daily. Indications: fibro, nerves, replaces cymbalta  Class: Normal  
 Pharmacy: [address] [facility], [address] Ph #: [phone] Route: Oral  
 raNITIdine (ZANTAC) 300 mg tablet 6 Sig: Take 1 Tab by mouth daily. Indications: Heartburn Class: Normal  
 Pharmacy: [address], [address] Ph #: [phone] Route: Oral  
  
Description 1/2 tablet Tues and Thurs with 1 tablet all other days November 2017 Details [place] Tue Wed Thu Fri Sat  
     1  
  
  
  
   2  
  
  
  
   3  
  
5 mg See details 4  
  
5 mg  
  
  
  5  
  
5 mg  
  
   6  
  
5 mg  
  
   7  
  
2.5 mg  
  
   8  
  
5 mg  
  
   9  
  
2.5 mg  
  
   10  
  
5 mg  
  
   11  
  
5 mg  
  
  
  12  
  
5 mg  
  
   13  
  
5 mg  
  
   14  
  
2.5 mg  
  
   15  
  
5 mg  
  
   16  
  
2.5 mg  
  
   17  
  
5 mg  
  
   18  
  
  
  
  
  19  
  
  
  
   20  
  
  
  
   21  
  
  
  
   22  
  
  
  
   23  
  
  
  
   24  
  
  
  
   25  
  
  
  
  
  26  
  
  
  
   27  
  
  
  
   28  
  
  
  
   29  
  
  
  
   30  
  
  
  
    
 Date Details 11/03 This INR check INR: 1.8 Additional INRs: 1.8 Date of next INR:  11/17/2017 How to take your warfarin dose To take:  2.5 mg Take half of a 5 mg tablet. To take:  5 mg Take one of the 5 mg tablets. We Performed the Following AMB POC PT/INR [86392 CPT(R)] AMB PT/INR EXTERNAL [[id number] CPT(R)] Comments: This order was created through the anticoagulation tracking navigator section. COLLECTION CAPILLARY BLOOD SPECIMEN [52275 CPT(R)] Follow-up Instructions Return in about 2 weeks (around 11/17/2017) for inr . Introducing [facility] & HEALTH SERVICES! Romayne Duster introduces [employer] patient portal. Now you can access parts of your medical record, email your doctor's office, and request medication refills online. 1. In your internet browser, go to https://"[employer]". [employer]/"[employer]" 2. Click on the First Time User? Click Here link in the Sign In box. You will see the New Member Sign Up page. 3. Enter your [employer] Access Code exactly as it appears below. You will not need to use this code after youve completed the sign-up process. If you do not sign up before the expiration date, you must request a new code. · [employer] Access Code: [email or web address] Expires: 2/1/2018  3:09 PM 
 
4. Enter the last four digits of your Social Security Number (xxxx) and Date of Birth (mm/dd/yyyy) as indicated and click Submit. You will be taken to the next sign-up page. 5. Create a [employer] ID. This will be your [employer] login ID and cannot be changed, so think of one that is secure and easy to remember. 6. Create a [employer] password. You can change your password at any time. 7. Enter your Password Reset Question and Answer. This can be used at a later time if you forget your password. 8. Enter your e-mail address. You will receive e-mail notification when new information is available in [address] 9. Click Sign Up. You can now view and download portions of your medical record. 10. Click the Download Summary menu link to download a portable copy of your medical information. If you have questions, please visit the Frequently Asked Questions section of the [employer] website. Remember, [employer] is NOT to be used for urgent needs. For medical emergencies, dial 911. Now available from your iPhone and Android! Please provide this summary of care documentation to your next provider. Your primary care clinician is listed as Flavio Koo. If you have any questions after today's visit, please call [phone]. Universal Safety Interventions